# Patient Record
Sex: FEMALE | Race: WHITE | Employment: OTHER | URBAN - METROPOLITAN AREA
[De-identification: names, ages, dates, MRNs, and addresses within clinical notes are randomized per-mention and may not be internally consistent; named-entity substitution may affect disease eponyms.]

---

## 2019-08-28 ENCOUNTER — HOSPITAL ENCOUNTER (OUTPATIENT)
Age: 49
Setting detail: SPECIMEN
Discharge: HOME | End: 2019-08-28
Payer: MEDICAID

## 2019-08-28 LAB
ABSOLUTE EOS #: 0.23 K/UL (ref 0–0.44)
ABSOLUTE IMMATURE GRANULOCYTE: <0.03 K/UL (ref 0–0.3)
ABSOLUTE LYMPH #: 3.92 K/UL (ref 1.1–3.7)
ABSOLUTE MONO #: 0.68 K/UL (ref 0.1–1.2)
ALBUMIN SERPL-MCNC: 4 G/DL (ref 3.5–5.2)
ALBUMIN/GLOBULIN RATIO: 1.1 (ref 1–2.5)
ALP BLD-CCNC: 192 U/L (ref 35–104)
ALT SERPL-CCNC: 199 U/L (ref 5–33)
ANION GAP SERPL CALCULATED.3IONS-SCNC: 13 MMOL/L (ref 9–17)
AST SERPL-CCNC: 250 U/L
BASOPHILS # BLD: 1 % (ref 0–2)
BASOPHILS ABSOLUTE: 0.05 K/UL (ref 0–0.2)
BILIRUB SERPL-MCNC: 0.52 MG/DL (ref 0.3–1.2)
BUN BLDV-MCNC: 18 MG/DL (ref 6–20)
BUN/CREAT BLD: ABNORMAL (ref 9–20)
CALCIUM SERPL-MCNC: 9.3 MG/DL (ref 8.6–10.4)
CHLORIDE BLD-SCNC: 98 MMOL/L (ref 98–107)
CHOLESTEROL/HDL RATIO: 2.7
CHOLESTEROL: 171 MG/DL
CO2: 29 MMOL/L (ref 20–31)
CREAT SERPL-MCNC: 0.89 MG/DL (ref 0.5–0.9)
DIFFERENTIAL TYPE: ABNORMAL
EOSINOPHILS RELATIVE PERCENT: 2 % (ref 1–4)
GFR AFRICAN AMERICAN: >60 ML/MIN
GFR NON-AFRICAN AMERICAN: >60 ML/MIN
GFR SERPL CREATININE-BSD FRML MDRD: ABNORMAL ML/MIN/{1.73_M2}
GFR SERPL CREATININE-BSD FRML MDRD: ABNORMAL ML/MIN/{1.73_M2}
GLUCOSE BLD-MCNC: 125 MG/DL (ref 70–99)
HBV SURFACE AB TITR SER: <3.5 MIU/ML
HCT VFR BLD CALC: 45.2 % (ref 36.3–47.1)
HDLC SERPL-MCNC: 63 MG/DL
HEMOGLOBIN: 14.7 G/DL (ref 11.9–15.1)
HEPATITIS B CORE IGM ANTIBODY: NONREACTIVE
HEPATITIS B SURFACE ANTIGEN: NONREACTIVE
HEPATITIS C ANTIBODY: REACTIVE
HIV AG/AB: NONREACTIVE
IMMATURE GRANULOCYTES: 0 %
LDL CHOLESTEROL: 76 MG/DL (ref 0–130)
LYMPHOCYTES # BLD: 40 % (ref 24–43)
MCH RBC QN AUTO: 29.6 PG (ref 25.2–33.5)
MCHC RBC AUTO-ENTMCNC: 32.5 G/DL (ref 28.4–34.8)
MCV RBC AUTO: 90.9 FL (ref 82.6–102.9)
MONOCYTES # BLD: 7 % (ref 3–12)
NRBC AUTOMATED: 0 PER 100 WBC
PDW BLD-RTO: 13.1 % (ref 11.8–14.4)
PLATELET # BLD: 325 K/UL (ref 138–453)
PLATELET ESTIMATE: ABNORMAL
PMV BLD AUTO: 10.6 FL (ref 8.1–13.5)
POTASSIUM SERPL-SCNC: 3.9 MMOL/L (ref 3.7–5.3)
RBC # BLD: 4.97 M/UL (ref 3.95–5.11)
RBC # BLD: ABNORMAL 10*6/UL
SEG NEUTROPHILS: 50 % (ref 36–65)
SEGMENTED NEUTROPHILS ABSOLUTE COUNT: 4.92 K/UL (ref 1.5–8.1)
SODIUM BLD-SCNC: 140 MMOL/L (ref 135–144)
TOTAL PROTEIN: 7.6 G/DL (ref 6.4–8.3)
TRIGL SERPL-MCNC: 161 MG/DL
TSH SERPL DL<=0.05 MIU/L-ACNC: 5.11 MIU/L (ref 0.3–5)
VLDLC SERPL CALC-MCNC: ABNORMAL MG/DL (ref 1–30)
WBC # BLD: 9.8 K/UL (ref 3.5–11.3)
WBC # BLD: ABNORMAL 10*3/UL

## 2019-08-29 LAB — THYROXINE, FREE: 1.52 NG/DL (ref 0.93–1.7)

## 2019-08-30 LAB
DIRECT EXAM: ABNORMAL
DIRECT EXAM: ABNORMAL
Lab: ABNORMAL
SPECIMEN DESCRIPTION: ABNORMAL

## 2019-08-31 LAB
HBV QNT LOG, IU/ML: NOT DETECTED LOG IU/ML
HBV QNT, IU/ML: NOT DETECTED IU/ML
INTERPRETATION: NOT DETECTED

## 2019-09-01 LAB
ALANINE AMINOTRANSFERASE, FIBROMETER: 205 U/L (ref 5–40)
ALPHA-2-MACROGLOBULIN, FIBROMETER: 266 MG/DL (ref 131–293)
ASPARTATE AMINOTRANSFERASE, FIBROMETER: 264 U/L (ref 9–40)
CIRRHOMETER PATIENT SCORE: 0
EER FIBROMETER REPORT: ABNORMAL
FIBROMETER INTERPRETATION: ABNORMAL
FIBROMETER PATIENT SCORE: 0.86
FIBROMETER PLATELET COUNT: 325
FIBROMETER PROTHROMBIN INDEX: 117 % (ref 90–120)
FIBROSIS METAVIR CLASSIFICATION: ABNORMAL
GAMMA GLUTAMYL TRANSFERASE, FIBROMETER: 437 U/L (ref 7–33)
INFLAMETER METAVIR CLASSIFICATION: ABNORMAL
INFLAMETER PATIENT SCORE: 0.58
UREA NITROGEN, FIBROMETER: 19 MG/DL (ref 7–20)

## 2019-09-03 LAB
HCV QUANTITATIVE: NORMAL
HEPATITIS C GENOTYPE: NORMAL

## 2019-10-16 LAB
CHOLESTEROL, TOTAL: NORMAL MG/DL
CHOLESTEROL/HDL RATIO: NORMAL
HDLC SERPL-MCNC: NORMAL MG/DL (ref 35–70)
LDL CHOLESTEROL CALCULATED: NORMAL MG/DL (ref 0–160)
TRIGL SERPL-MCNC: NORMAL MG/DL
VLDLC SERPL CALC-MCNC: NORMAL MG/DL

## 2019-12-12 ENCOUNTER — HOSPITAL ENCOUNTER (INPATIENT)
Age: 49
LOS: 2 days | Discharge: HOME | DRG: 190 | End: 2019-12-14
Attending: FAMILY MEDICINE | Admitting: FAMILY MEDICINE
Payer: MEDICAID

## 2019-12-12 DIAGNOSIS — S82.892S CLOSED FRACTURE OF LEFT ANKLE, SEQUELA: ICD-10-CM

## 2019-12-12 DIAGNOSIS — I20.0 UNSTABLE ANGINA (HCC): Primary | ICD-10-CM

## 2019-12-12 LAB — APTT: 30.9 SECONDS (ref 22–38)

## 2019-12-12 PROCEDURE — 2709999900 HC NON-CHARGEABLE SUPPLY

## 2019-12-12 PROCEDURE — 85730 THROMBOPLASTIN TIME PARTIAL: CPT

## 2019-12-12 PROCEDURE — 36415 COLL VENOUS BLD VENIPUNCTURE: CPT

## 2019-12-12 PROCEDURE — 99223 1ST HOSP IP/OBS HIGH 75: CPT | Performed by: PHYSICIAN ASSISTANT

## 2019-12-12 PROCEDURE — 2140000000 HC CCU INTERMEDIATE R&B

## 2019-12-12 RX ORDER — LANSOPRAZOLE 15 MG/1
15 CAPSULE, DELAYED RELEASE ORAL DAILY PRN
COMMUNITY
Start: 2018-12-27

## 2019-12-12 RX ORDER — ONDANSETRON 4 MG/1
4 TABLET, ORALLY DISINTEGRATING ORAL EVERY 6 HOURS PRN
COMMUNITY
Start: 2019-08-07

## 2019-12-12 RX ORDER — ACETAMINOPHEN 325 MG/1
650 TABLET ORAL EVERY 4 HOURS PRN
COMMUNITY
Start: 2018-12-26

## 2019-12-12 RX ORDER — MIRTAZAPINE 15 MG/1
1 TABLET, FILM COATED ORAL NIGHTLY
Refills: 1 | COMMUNITY
Start: 2019-11-08

## 2019-12-12 RX ORDER — IBUPROFEN 800 MG/1
800 TABLET ORAL EVERY 8 HOURS PRN
Status: ON HOLD | COMMUNITY
Start: 2019-03-10 | End: 2019-12-14 | Stop reason: HOSPADM

## 2019-12-12 RX ORDER — DIVALPROEX SODIUM 125 MG/1
1 TABLET, DELAYED RELEASE ORAL 2 TIMES DAILY
Refills: 0 | COMMUNITY
Start: 2019-11-08

## 2019-12-12 RX ORDER — DULAGLUTIDE 0.75 MG/.5ML
1 INJECTION, SOLUTION SUBCUTANEOUS
Refills: 3 | COMMUNITY
Start: 2019-11-08

## 2019-12-12 RX ORDER — GABAPENTIN 300 MG/1
300 CAPSULE ORAL 3 TIMES DAILY
COMMUNITY
Start: 2019-01-10

## 2019-12-12 RX ORDER — LOSARTAN POTASSIUM 25 MG/1
25 TABLET ORAL
Status: ON HOLD | COMMUNITY
Start: 2018-12-27 | End: 2020-07-30 | Stop reason: HOSPADM

## 2019-12-12 RX ORDER — TORSEMIDE 20 MG/1
1 TABLET ORAL DAILY
Refills: 1 | Status: ON HOLD | COMMUNITY
Start: 2019-11-08 | End: 2020-07-30 | Stop reason: HOSPADM

## 2019-12-12 RX ORDER — MECLIZINE HCL 12.5 MG/1
12.5 TABLET ORAL
COMMUNITY
Start: 2019-06-18

## 2019-12-12 RX ORDER — SODIUM CHLORIDE 0.9 % (FLUSH) 0.9 %
10 SYRINGE (ML) INJECTION PRN
Status: DISCONTINUED | OUTPATIENT
Start: 2019-12-12 | End: 2019-12-14 | Stop reason: HOSPADM

## 2019-12-12 RX ORDER — ASPIRIN 81 MG/1
81 TABLET, CHEWABLE ORAL DAILY
Status: DISCONTINUED | OUTPATIENT
Start: 2019-12-13 | End: 2019-12-13

## 2019-12-12 RX ORDER — HEPARIN SODIUM 10000 [USP'U]/100ML
8.1 INJECTION, SOLUTION INTRAVENOUS CONTINUOUS
Status: DISCONTINUED | OUTPATIENT
Start: 2019-12-12 | End: 2019-12-14

## 2019-12-12 RX ORDER — HEPARIN SODIUM 1000 [USP'U]/ML
4000 INJECTION, SOLUTION INTRAVENOUS; SUBCUTANEOUS PRN
Status: DISCONTINUED | OUTPATIENT
Start: 2019-12-12 | End: 2019-12-14

## 2019-12-12 RX ORDER — OLANZAPINE 5 MG/1
1 TABLET ORAL NIGHTLY
Refills: 0 | COMMUNITY
Start: 2019-11-08

## 2019-12-12 RX ORDER — ACETAMINOPHEN 325 MG/1
650 TABLET ORAL EVERY 4 HOURS PRN
Status: DISCONTINUED | OUTPATIENT
Start: 2019-12-12 | End: 2019-12-14 | Stop reason: HOSPADM

## 2019-12-12 RX ORDER — HEPARIN SODIUM 1000 [USP'U]/ML
2000 INJECTION, SOLUTION INTRAVENOUS; SUBCUTANEOUS PRN
Status: DISCONTINUED | OUTPATIENT
Start: 2019-12-12 | End: 2019-12-14 | Stop reason: HOSPADM

## 2019-12-12 RX ORDER — ATOMOXETINE 25 MG/1
25 CAPSULE ORAL DAILY
COMMUNITY
Start: 2019-01-10

## 2019-12-12 RX ORDER — CITALOPRAM 40 MG/1
40 TABLET ORAL NIGHTLY
Status: ON HOLD | COMMUNITY
Start: 2019-01-10 | End: 2020-07-27

## 2019-12-12 RX ORDER — MINOCYCLINE HYDROCHLORIDE 100 MG/1
1 CAPSULE ORAL 2 TIMES DAILY
Refills: 2 | COMMUNITY
Start: 2019-11-08

## 2019-12-12 RX ORDER — SODIUM CHLORIDE 0.9 % (FLUSH) 0.9 %
10 SYRINGE (ML) INJECTION EVERY 12 HOURS SCHEDULED
Status: DISCONTINUED | OUTPATIENT
Start: 2019-12-12 | End: 2019-12-14 | Stop reason: HOSPADM

## 2019-12-12 RX ORDER — VENLAFAXINE HYDROCHLORIDE 75 MG/1
1 CAPSULE, EXTENDED RELEASE ORAL DAILY
Refills: 0 | COMMUNITY
Start: 2019-11-08

## 2019-12-12 RX ORDER — ASPIRIN 81 MG/1
81 TABLET, CHEWABLE ORAL DAILY
COMMUNITY
Start: 2018-12-27

## 2019-12-12 RX ORDER — ONDANSETRON 2 MG/ML
4 INJECTION INTRAMUSCULAR; INTRAVENOUS EVERY 6 HOURS PRN
Status: DISCONTINUED | OUTPATIENT
Start: 2019-12-12 | End: 2019-12-14 | Stop reason: HOSPADM

## 2019-12-12 RX ORDER — MELATONIN 3 MG
10 TABLET ORAL NIGHTLY
Status: ON HOLD | COMMUNITY
End: 2020-07-27

## 2019-12-12 RX ORDER — ATORVASTATIN CALCIUM 20 MG/1
1 TABLET, FILM COATED ORAL NIGHTLY
COMMUNITY
Start: 2018-12-27

## 2019-12-12 RX ORDER — LEVOTHYROXINE SODIUM 0.05 MG/1
50 TABLET ORAL
COMMUNITY
Start: 2019-08-29

## 2019-12-12 RX ORDER — HEPARIN SODIUM 1000 [USP'U]/ML
4000 INJECTION, SOLUTION INTRAVENOUS; SUBCUTANEOUS ONCE
Status: DISCONTINUED | OUTPATIENT
Start: 2019-12-13 | End: 2019-12-14

## 2019-12-12 ASSESSMENT — PAIN SCALES - GENERAL: PAINLEVEL_OUTOF10: 6

## 2019-12-12 ASSESSMENT — PAIN DESCRIPTION - PAIN TYPE: TYPE: ACUTE PAIN

## 2019-12-13 ENCOUNTER — APPOINTMENT (OUTPATIENT)
Dept: CARDIAC CATH/INVASIVE PROCEDURES | Age: 49
DRG: 190 | End: 2019-12-13
Attending: FAMILY MEDICINE
Payer: MEDICAID

## 2019-12-13 ENCOUNTER — APPOINTMENT (OUTPATIENT)
Dept: INTERVENTIONAL RADIOLOGY/VASCULAR | Age: 49
DRG: 190 | End: 2019-12-13
Attending: FAMILY MEDICINE
Payer: MEDICAID

## 2019-12-13 LAB
ALBUMIN SERPL-MCNC: 3.2 G/DL (ref 3.5–5.1)
ALP BLD-CCNC: 167 U/L (ref 38–126)
ALT SERPL-CCNC: 81 U/L (ref 11–66)
ANION GAP SERPL CALCULATED.3IONS-SCNC: 12 MEQ/L (ref 8–16)
APTT: 31.2 SECONDS (ref 22–38)
APTT: > 200 SECONDS (ref 22–38)
AST SERPL-CCNC: 125 U/L (ref 5–40)
BILIRUB SERPL-MCNC: 0.4 MG/DL (ref 0.3–1.2)
BUN BLDV-MCNC: 10 MG/DL (ref 7–22)
CALCIUM SERPL-MCNC: 8.5 MG/DL (ref 8.5–10.5)
CHLORIDE BLD-SCNC: 96 MEQ/L (ref 98–111)
CO2: 24 MEQ/L (ref 23–33)
CREAT SERPL-MCNC: 0.7 MG/DL (ref 0.4–1.2)
EKG ATRIAL RATE: 63 BPM
EKG ATRIAL RATE: 68 BPM
EKG P AXIS: -3 DEGREES
EKG P AXIS: 70 DEGREES
EKG P-R INTERVAL: 148 MS
EKG P-R INTERVAL: 150 MS
EKG Q-T INTERVAL: 434 MS
EKG Q-T INTERVAL: 442 MS
EKG QRS DURATION: 112 MS
EKG QRS DURATION: 116 MS
EKG QTC CALCULATION (BAZETT): 452 MS
EKG QTC CALCULATION (BAZETT): 461 MS
EKG R AXIS: -60 DEGREES
EKG R AXIS: -66 DEGREES
EKG T AXIS: 19 DEGREES
EKG T AXIS: 56 DEGREES
EKG VENTRICULAR RATE: 63 BPM
EKG VENTRICULAR RATE: 68 BPM
ERYTHROCYTE [DISTWIDTH] IN BLOOD BY AUTOMATED COUNT: 13.2 % (ref 11.5–14.5)
ERYTHROCYTE [DISTWIDTH] IN BLOOD BY AUTOMATED COUNT: 13.2 % (ref 11.5–14.5)
ERYTHROCYTE [DISTWIDTH] IN BLOOD BY AUTOMATED COUNT: 40.9 FL (ref 35–45)
ERYTHROCYTE [DISTWIDTH] IN BLOOD BY AUTOMATED COUNT: 42.5 FL (ref 35–45)
GFR SERPL CREATININE-BSD FRML MDRD: 89 ML/MIN/1.73M2
GLUCOSE BLD-MCNC: 177 MG/DL (ref 70–108)
GLUCOSE BLD-MCNC: 229 MG/DL (ref 70–108)
GLUCOSE BLD-MCNC: 286 MG/DL (ref 70–108)
GLUCOSE BLD-MCNC: 296 MG/DL (ref 70–108)
GLUCOSE BLD-MCNC: 339 MG/DL (ref 70–108)
GLUCOSE BLD-MCNC: 342 MG/DL (ref 70–108)
HCT VFR BLD CALC: 39.9 % (ref 37–47)
HCT VFR BLD CALC: 40.3 % (ref 37–47)
HEMOGLOBIN: 13.7 GM/DL (ref 12–16)
HEMOGLOBIN: 13.8 GM/DL (ref 12–16)
LV EF: 55 %
LVEF MODALITY: NORMAL
MCH RBC QN AUTO: 29.8 PG (ref 26–33)
MCH RBC QN AUTO: 30.2 PG (ref 26–33)
MCHC RBC AUTO-ENTMCNC: 34.2 GM/DL (ref 32.2–35.5)
MCHC RBC AUTO-ENTMCNC: 34.3 GM/DL (ref 32.2–35.5)
MCV RBC AUTO: 86.9 FL (ref 81–99)
MCV RBC AUTO: 88.2 FL (ref 81–99)
PLATELET # BLD: 241 THOU/MM3 (ref 130–400)
PLATELET # BLD: 260 THOU/MM3 (ref 130–400)
PMV BLD AUTO: 9.6 FL (ref 9.4–12.4)
PMV BLD AUTO: 9.8 FL (ref 9.4–12.4)
POTASSIUM SERPL-SCNC: 3.7 MEQ/L (ref 3.5–5.2)
PRO-BNP: 382.4 PG/ML (ref 0–450)
RBC # BLD: 4.57 MILL/MM3 (ref 4.2–5.4)
RBC # BLD: 4.59 MILL/MM3 (ref 4.2–5.4)
SODIUM BLD-SCNC: 132 MEQ/L (ref 135–145)
TOTAL PROTEIN: 6.3 G/DL (ref 6.1–8)
TROPONIN T: 0.02 NG/ML
TROPONIN T: 0.03 NG/ML
WBC # BLD: 8.3 THOU/MM3 (ref 4.8–10.8)
WBC # BLD: 8.7 THOU/MM3 (ref 4.8–10.8)

## 2019-12-13 PROCEDURE — C1769 GUIDE WIRE: HCPCS

## 2019-12-13 PROCEDURE — 94760 N-INVAS EAR/PLS OXIMETRY 1: CPT

## 2019-12-13 PROCEDURE — 2709999900 HC NON-CHARGEABLE SUPPLY

## 2019-12-13 PROCEDURE — C1894 INTRO/SHEATH, NON-LASER: HCPCS

## 2019-12-13 PROCEDURE — 93459 L HRT ART/GRFT ANGIO: CPT | Performed by: INTERNAL MEDICINE

## 2019-12-13 PROCEDURE — 93970 EXTREMITY STUDY: CPT

## 2019-12-13 PROCEDURE — 36415 COLL VENOUS BLD VENIPUNCTURE: CPT

## 2019-12-13 PROCEDURE — B2121ZZ FLUOROSCOPY OF SINGLE CORONARY ARTERY BYPASS GRAFT USING LOW OSMOLAR CONTRAST: ICD-10-PCS | Performed by: INTERNAL MEDICINE

## 2019-12-13 PROCEDURE — 2580000003 HC RX 258: Performed by: INTERNAL MEDICINE

## 2019-12-13 PROCEDURE — 99233 SBSQ HOSP IP/OBS HIGH 50: CPT | Performed by: INTERNAL MEDICINE

## 2019-12-13 PROCEDURE — 93458 L HRT ARTERY/VENTRICLE ANGIO: CPT | Performed by: INTERNAL MEDICINE

## 2019-12-13 PROCEDURE — 6370000000 HC RX 637 (ALT 250 FOR IP): Performed by: NUCLEAR MEDICINE

## 2019-12-13 PROCEDURE — B2151ZZ FLUOROSCOPY OF LEFT HEART USING LOW OSMOLAR CONTRAST: ICD-10-PCS | Performed by: INTERNAL MEDICINE

## 2019-12-13 PROCEDURE — 2500000003 HC RX 250 WO HCPCS: Performed by: INTERNAL MEDICINE

## 2019-12-13 PROCEDURE — 2500000003 HC RX 250 WO HCPCS

## 2019-12-13 PROCEDURE — 99254 IP/OBS CNSLTJ NEW/EST MOD 60: CPT | Performed by: NUCLEAR MEDICINE

## 2019-12-13 PROCEDURE — 93010 ELECTROCARDIOGRAM REPORT: CPT | Performed by: NUCLEAR MEDICINE

## 2019-12-13 PROCEDURE — 6370000000 HC RX 637 (ALT 250 FOR IP): Performed by: INTERNAL MEDICINE

## 2019-12-13 PROCEDURE — 83880 ASSAY OF NATRIURETIC PEPTIDE: CPT

## 2019-12-13 PROCEDURE — 82948 REAGENT STRIP/BLOOD GLUCOSE: CPT

## 2019-12-13 PROCEDURE — 4A023N7 MEASUREMENT OF CARDIAC SAMPLING AND PRESSURE, LEFT HEART, PERCUTANEOUS APPROACH: ICD-10-PCS | Performed by: INTERNAL MEDICINE

## 2019-12-13 PROCEDURE — 85027 COMPLETE CBC AUTOMATED: CPT

## 2019-12-13 PROCEDURE — B2111ZZ FLUOROSCOPY OF MULTIPLE CORONARY ARTERIES USING LOW OSMOLAR CONTRAST: ICD-10-PCS | Performed by: INTERNAL MEDICINE

## 2019-12-13 PROCEDURE — 84484 ASSAY OF TROPONIN QUANT: CPT

## 2019-12-13 PROCEDURE — 93005 ELECTROCARDIOGRAM TRACING: CPT | Performed by: PHYSICIAN ASSISTANT

## 2019-12-13 PROCEDURE — 6370000000 HC RX 637 (ALT 250 FOR IP): Performed by: PHYSICIAN ASSISTANT

## 2019-12-13 PROCEDURE — 2140000000 HC CCU INTERMEDIATE R&B

## 2019-12-13 PROCEDURE — 6360000002 HC RX W HCPCS

## 2019-12-13 PROCEDURE — 80053 COMPREHEN METABOLIC PANEL: CPT

## 2019-12-13 PROCEDURE — 85730 THROMBOPLASTIN TIME PARTIAL: CPT

## 2019-12-13 PROCEDURE — 6360000002 HC RX W HCPCS: Performed by: PHYSICIAN ASSISTANT

## 2019-12-13 PROCEDURE — 93005 ELECTROCARDIOGRAM TRACING: CPT | Performed by: INTERNAL MEDICINE

## 2019-12-13 PROCEDURE — 93306 TTE W/DOPPLER COMPLETE: CPT

## 2019-12-13 PROCEDURE — 6360000004 HC RX CONTRAST MEDICATION: Performed by: INTERNAL MEDICINE

## 2019-12-13 RX ORDER — MIRTAZAPINE 15 MG/1
15 TABLET, FILM COATED ORAL NIGHTLY
Status: DISCONTINUED | OUTPATIENT
Start: 2019-12-13 | End: 2019-12-14 | Stop reason: HOSPADM

## 2019-12-13 RX ORDER — ONDANSETRON 2 MG/ML
4 INJECTION INTRAMUSCULAR; INTRAVENOUS EVERY 6 HOURS PRN
Status: DISCONTINUED | OUTPATIENT
Start: 2019-12-13 | End: 2019-12-13

## 2019-12-13 RX ORDER — TORSEMIDE 20 MG/1
20 TABLET ORAL DAILY
Status: DISCONTINUED | OUTPATIENT
Start: 2019-12-13 | End: 2019-12-14 | Stop reason: HOSPADM

## 2019-12-13 RX ORDER — TRAMADOL HYDROCHLORIDE 50 MG/1
50 TABLET ORAL EVERY 4 HOURS
Status: DISCONTINUED | OUTPATIENT
Start: 2019-12-13 | End: 2019-12-13

## 2019-12-13 RX ORDER — TRAMADOL HYDROCHLORIDE 50 MG/1
50 TABLET ORAL EVERY 4 HOURS PRN
Status: DISCONTINUED | OUTPATIENT
Start: 2019-12-13 | End: 2019-12-14 | Stop reason: HOSPADM

## 2019-12-13 RX ORDER — PANTOPRAZOLE SODIUM 40 MG/1
40 TABLET, DELAYED RELEASE ORAL
Status: DISCONTINUED | OUTPATIENT
Start: 2019-12-13 | End: 2019-12-14 | Stop reason: HOSPADM

## 2019-12-13 RX ORDER — ATORVASTATIN CALCIUM 20 MG/1
20 TABLET, FILM COATED ORAL NIGHTLY
Status: DISCONTINUED | OUTPATIENT
Start: 2019-12-13 | End: 2019-12-14 | Stop reason: HOSPADM

## 2019-12-13 RX ORDER — SODIUM CHLORIDE 0.9 % (FLUSH) 0.9 %
10 SYRINGE (ML) INJECTION EVERY 12 HOURS SCHEDULED
Status: DISCONTINUED | OUTPATIENT
Start: 2019-12-13 | End: 2019-12-13 | Stop reason: SDUPTHER

## 2019-12-13 RX ORDER — ASPIRIN 81 MG/1
81 TABLET, CHEWABLE ORAL DAILY
Status: DISCONTINUED | OUTPATIENT
Start: 2019-12-13 | End: 2019-12-14 | Stop reason: HOSPADM

## 2019-12-13 RX ORDER — NITROGLYCERIN 20 MG/100ML
5 INJECTION INTRAVENOUS CONTINUOUS
Status: DISCONTINUED | OUTPATIENT
Start: 2019-12-13 | End: 2019-12-14

## 2019-12-13 RX ORDER — MORPHINE SULFATE 2 MG/ML
2 INJECTION, SOLUTION INTRAMUSCULAR; INTRAVENOUS
Status: ACTIVE | OUTPATIENT
Start: 2019-12-13 | End: 2019-12-13

## 2019-12-13 RX ORDER — OLANZAPINE 5 MG/1
5 TABLET ORAL NIGHTLY
Status: DISCONTINUED | OUTPATIENT
Start: 2019-12-13 | End: 2019-12-14 | Stop reason: HOSPADM

## 2019-12-13 RX ORDER — LANOLIN ALCOHOL/MO/W.PET/CERES
6 CREAM (GRAM) TOPICAL NIGHTLY
Status: DISCONTINUED | OUTPATIENT
Start: 2019-12-13 | End: 2019-12-14 | Stop reason: HOSPADM

## 2019-12-13 RX ORDER — VENLAFAXINE HYDROCHLORIDE 75 MG/1
75 CAPSULE, EXTENDED RELEASE ORAL DAILY
Status: DISCONTINUED | OUTPATIENT
Start: 2019-12-13 | End: 2019-12-14 | Stop reason: HOSPADM

## 2019-12-13 RX ORDER — LEVOTHYROXINE SODIUM 0.05 MG/1
50 TABLET ORAL
Status: DISCONTINUED | OUTPATIENT
Start: 2019-12-13 | End: 2019-12-14 | Stop reason: HOSPADM

## 2019-12-13 RX ORDER — DIVALPROEX SODIUM 125 MG/1
125 CAPSULE, COATED PELLETS ORAL 2 TIMES DAILY WITH MEALS
Status: DISCONTINUED | OUTPATIENT
Start: 2019-12-13 | End: 2019-12-14 | Stop reason: HOSPADM

## 2019-12-13 RX ORDER — CITALOPRAM 40 MG/1
40 TABLET ORAL NIGHTLY
Status: DISCONTINUED | OUTPATIENT
Start: 2019-12-13 | End: 2019-12-13

## 2019-12-13 RX ORDER — NITROGLYCERIN 20 MG/100ML
5 INJECTION INTRAVENOUS CONTINUOUS
Status: DISCONTINUED | OUTPATIENT
Start: 2019-12-13 | End: 2019-12-13 | Stop reason: SDUPTHER

## 2019-12-13 RX ORDER — ATROPINE SULFATE 0.4 MG/ML
0.5 AMPUL (ML) INJECTION
Status: ACTIVE | OUTPATIENT
Start: 2019-12-13 | End: 2019-12-13

## 2019-12-13 RX ORDER — SODIUM CHLORIDE 9 MG/ML
75 INJECTION, SOLUTION INTRAVENOUS CONTINUOUS
Status: DISCONTINUED | OUTPATIENT
Start: 2019-12-13 | End: 2019-12-14

## 2019-12-13 RX ORDER — ACETAMINOPHEN 325 MG/1
650 TABLET ORAL EVERY 4 HOURS PRN
Status: DISCONTINUED | OUTPATIENT
Start: 2019-12-13 | End: 2019-12-13

## 2019-12-13 RX ORDER — INSULIN GLARGINE 100 [IU]/ML
20 INJECTION, SOLUTION SUBCUTANEOUS 2 TIMES DAILY
Status: DISCONTINUED | OUTPATIENT
Start: 2019-12-14 | End: 2019-12-14 | Stop reason: HOSPADM

## 2019-12-13 RX ORDER — GABAPENTIN 300 MG/1
300 CAPSULE ORAL 3 TIMES DAILY
Status: DISCONTINUED | OUTPATIENT
Start: 2019-12-13 | End: 2019-12-14 | Stop reason: HOSPADM

## 2019-12-13 RX ORDER — LOSARTAN POTASSIUM 25 MG/1
25 TABLET ORAL
Status: DISCONTINUED | OUTPATIENT
Start: 2019-12-13 | End: 2019-12-14 | Stop reason: HOSPADM

## 2019-12-13 RX ORDER — SODIUM CHLORIDE 0.9 % (FLUSH) 0.9 %
10 SYRINGE (ML) INJECTION PRN
Status: DISCONTINUED | OUTPATIENT
Start: 2019-12-13 | End: 2019-12-13

## 2019-12-13 RX ADMIN — METOPROLOL TARTRATE 25 MG: 25 TABLET ORAL at 01:12

## 2019-12-13 RX ADMIN — VENLAFAXINE HYDROCHLORIDE 75 MG: 75 CAPSULE, EXTENDED RELEASE ORAL at 09:10

## 2019-12-13 RX ADMIN — METOPROLOL TARTRATE 25 MG: 25 TABLET ORAL at 08:17

## 2019-12-13 RX ADMIN — TRAMADOL HYDROCHLORIDE 50 MG: 50 TABLET, FILM COATED ORAL at 20:58

## 2019-12-13 RX ADMIN — Medication 6 MG: at 20:58

## 2019-12-13 RX ADMIN — INSULIN LISPRO 3 UNITS: 100 INJECTION, SOLUTION INTRAVENOUS; SUBCUTANEOUS at 20:58

## 2019-12-13 RX ADMIN — NITROGLYCERIN 5 MCG/MIN: 20 INJECTION INTRAVENOUS at 09:03

## 2019-12-13 RX ADMIN — DIVALPROEX SODIUM 125 MG: 125 CAPSULE ORAL at 18:07

## 2019-12-13 RX ADMIN — LOSARTAN POTASSIUM 25 MG: 25 TABLET, FILM COATED ORAL at 08:17

## 2019-12-13 RX ADMIN — PANTOPRAZOLE SODIUM 40 MG: 40 TABLET, DELAYED RELEASE ORAL at 08:17

## 2019-12-13 RX ADMIN — ACETAMINOPHEN 650 MG: 325 TABLET ORAL at 10:45

## 2019-12-13 RX ADMIN — Medication 6 MG: at 01:12

## 2019-12-13 RX ADMIN — HEPARIN SODIUM 16 UNITS/KG/HR: 10000 INJECTION, SOLUTION INTRAVENOUS at 01:13

## 2019-12-13 RX ADMIN — ATORVASTATIN CALCIUM 20 MG: 20 TABLET, FILM COATED ORAL at 20:58

## 2019-12-13 RX ADMIN — DIVALPROEX SODIUM 125 MG: 125 CAPSULE ORAL at 08:17

## 2019-12-13 RX ADMIN — GABAPENTIN 300 MG: 300 CAPSULE ORAL at 09:10

## 2019-12-13 RX ADMIN — ATORVASTATIN CALCIUM 20 MG: 20 TABLET, FILM COATED ORAL at 01:12

## 2019-12-13 RX ADMIN — IOPAMIDOL 80 ML: 755 INJECTION, SOLUTION INTRAVENOUS at 15:27

## 2019-12-13 RX ADMIN — METOPROLOL TARTRATE 25 MG: 25 TABLET ORAL at 20:58

## 2019-12-13 RX ADMIN — GABAPENTIN 300 MG: 300 CAPSULE ORAL at 16:49

## 2019-12-13 RX ADMIN — GABAPENTIN 300 MG: 300 CAPSULE ORAL at 20:58

## 2019-12-13 RX ADMIN — MIRTAZAPINE 15 MG: 15 TABLET, FILM COATED ORAL at 20:58

## 2019-12-13 RX ADMIN — SODIUM CHLORIDE 75 ML/HR: 9 INJECTION, SOLUTION INTRAVENOUS at 16:51

## 2019-12-13 RX ADMIN — TRAMADOL HYDROCHLORIDE 50 MG: 50 TABLET, FILM COATED ORAL at 12:33

## 2019-12-13 RX ADMIN — HEPARIN SODIUM 12 UNITS/KG/HR: 10000 INJECTION, SOLUTION INTRAVENOUS at 12:34

## 2019-12-13 RX ADMIN — ASPIRIN 81 MG 81 MG: 81 TABLET ORAL at 09:09

## 2019-12-13 RX ADMIN — ACETAMINOPHEN 650 MG: 325 TABLET ORAL at 20:05

## 2019-12-13 RX ADMIN — MIRTAZAPINE 15 MG: 15 TABLET, FILM COATED ORAL at 01:12

## 2019-12-13 RX ADMIN — OLANZAPINE 5 MG: 5 TABLET, FILM COATED ORAL at 20:58

## 2019-12-13 RX ADMIN — LEVOTHYROXINE SODIUM 50 MCG: 50 TABLET ORAL at 08:17

## 2019-12-13 RX ADMIN — TRAMADOL HYDROCHLORIDE 50 MG: 50 TABLET, FILM COATED ORAL at 16:49

## 2019-12-13 RX ADMIN — INSULIN LISPRO 4 UNITS: 100 INJECTION, SOLUTION INTRAVENOUS; SUBCUTANEOUS at 02:43

## 2019-12-13 RX ADMIN — HEPARIN SODIUM 4000 UNITS: 1000 INJECTION INTRAVENOUS; SUBCUTANEOUS at 01:13

## 2019-12-13 RX ADMIN — OLANZAPINE 5 MG: 5 TABLET, FILM COATED ORAL at 01:12

## 2019-12-13 RX ADMIN — INSULIN LISPRO 2 UNITS: 100 INJECTION, SOLUTION INTRAVENOUS; SUBCUTANEOUS at 18:07

## 2019-12-13 ASSESSMENT — ENCOUNTER SYMPTOMS
SHORTNESS OF BREATH: 0
ABDOMINAL PAIN: 0
EYE PAIN: 0
SINUS PAIN: 0
BACK PAIN: 0
EYE REDNESS: 0
WHEEZING: 0
ABDOMINAL DISTENTION: 0
COUGH: 0
SORE THROAT: 0

## 2019-12-13 ASSESSMENT — PAIN SCALES - GENERAL
PAINLEVEL_OUTOF10: 1
PAINLEVEL_OUTOF10: 6
PAINLEVEL_OUTOF10: 5
PAINLEVEL_OUTOF10: 5
PAINLEVEL_OUTOF10: 6

## 2019-12-13 ASSESSMENT — PAIN DESCRIPTION - FREQUENCY: FREQUENCY: CONTINUOUS

## 2019-12-13 ASSESSMENT — PAIN - FUNCTIONAL ASSESSMENT: PAIN_FUNCTIONAL_ASSESSMENT: ACTIVITIES ARE NOT PREVENTED

## 2019-12-13 ASSESSMENT — PAIN DESCRIPTION - LOCATION: LOCATION: CHEST

## 2019-12-13 ASSESSMENT — PAIN DESCRIPTION - PROGRESSION
CLINICAL_PROGRESSION: GRADUALLY IMPROVING

## 2019-12-13 ASSESSMENT — PAIN DESCRIPTION - ORIENTATION: ORIENTATION: MID

## 2019-12-13 ASSESSMENT — PAIN DESCRIPTION - ONSET: ONSET: ON-GOING

## 2019-12-13 ASSESSMENT — PAIN DESCRIPTION - PAIN TYPE: TYPE: ACUTE PAIN

## 2019-12-13 ASSESSMENT — PAIN DESCRIPTION - DESCRIPTORS: DESCRIPTORS: DISCOMFORT

## 2019-12-14 VITALS
BODY MASS INDEX: 44.2 KG/M2 | HEIGHT: 65 IN | OXYGEN SATURATION: 96 % | WEIGHT: 265.3 LBS | RESPIRATION RATE: 18 BRPM | SYSTOLIC BLOOD PRESSURE: 122 MMHG | TEMPERATURE: 97.6 F | HEART RATE: 62 BPM | DIASTOLIC BLOOD PRESSURE: 64 MMHG

## 2019-12-14 LAB
ANION GAP SERPL CALCULATED.3IONS-SCNC: 9 MEQ/L (ref 8–16)
BUN BLDV-MCNC: 14 MG/DL (ref 7–22)
CALCIUM SERPL-MCNC: 8.2 MG/DL (ref 8.5–10.5)
CHLORIDE BLD-SCNC: 102 MEQ/L (ref 98–111)
CO2: 23 MEQ/L (ref 23–33)
CREAT SERPL-MCNC: 0.7 MG/DL (ref 0.4–1.2)
ERYTHROCYTE [DISTWIDTH] IN BLOOD BY AUTOMATED COUNT: 13.5 % (ref 11.5–14.5)
ERYTHROCYTE [DISTWIDTH] IN BLOOD BY AUTOMATED COUNT: 44.9 FL (ref 35–45)
GFR SERPL CREATININE-BSD FRML MDRD: 89 ML/MIN/1.73M2
GLUCOSE BLD-MCNC: 293 MG/DL (ref 70–108)
GLUCOSE BLD-MCNC: 293 MG/DL (ref 70–108)
GLUCOSE BLD-MCNC: 379 MG/DL (ref 70–108)
HCT VFR BLD CALC: 38.7 % (ref 37–47)
HEMOGLOBIN: 12.8 GM/DL (ref 12–16)
MCH RBC QN AUTO: 30 PG (ref 26–33)
MCHC RBC AUTO-ENTMCNC: 33.1 GM/DL (ref 32.2–35.5)
MCV RBC AUTO: 90.8 FL (ref 81–99)
PLATELET # BLD: 246 THOU/MM3 (ref 130–400)
PMV BLD AUTO: 9.8 FL (ref 9.4–12.4)
POTASSIUM SERPL-SCNC: 3.9 MEQ/L (ref 3.5–5.2)
RBC # BLD: 4.26 MILL/MM3 (ref 4.2–5.4)
SODIUM BLD-SCNC: 134 MEQ/L (ref 135–145)
WBC # BLD: 7 THOU/MM3 (ref 4.8–10.8)

## 2019-12-14 PROCEDURE — 6370000000 HC RX 637 (ALT 250 FOR IP): Performed by: INTERNAL MEDICINE

## 2019-12-14 PROCEDURE — 80048 BASIC METABOLIC PNL TOTAL CA: CPT

## 2019-12-14 PROCEDURE — 6360000002 HC RX W HCPCS: Performed by: PHYSICIAN ASSISTANT

## 2019-12-14 PROCEDURE — 97530 THERAPEUTIC ACTIVITIES: CPT

## 2019-12-14 PROCEDURE — 97163 PT EVAL HIGH COMPLEX 45 MIN: CPT

## 2019-12-14 PROCEDURE — 2709999900 HC NON-CHARGEABLE SUPPLY

## 2019-12-14 PROCEDURE — G0008 ADMIN INFLUENZA VIRUS VAC: HCPCS | Performed by: PHYSICIAN ASSISTANT

## 2019-12-14 PROCEDURE — 82948 REAGENT STRIP/BLOOD GLUCOSE: CPT

## 2019-12-14 PROCEDURE — 2580000003 HC RX 258: Performed by: INTERNAL MEDICINE

## 2019-12-14 PROCEDURE — 6370000000 HC RX 637 (ALT 250 FOR IP): Performed by: NUCLEAR MEDICINE

## 2019-12-14 PROCEDURE — 6370000000 HC RX 637 (ALT 250 FOR IP): Performed by: PHYSICIAN ASSISTANT

## 2019-12-14 PROCEDURE — 99239 HOSP IP/OBS DSCHRG MGMT >30: CPT | Performed by: INTERNAL MEDICINE

## 2019-12-14 PROCEDURE — 2580000003 HC RX 258: Performed by: PHYSICIAN ASSISTANT

## 2019-12-14 PROCEDURE — 36415 COLL VENOUS BLD VENIPUNCTURE: CPT

## 2019-12-14 PROCEDURE — 90686 IIV4 VACC NO PRSV 0.5 ML IM: CPT | Performed by: PHYSICIAN ASSISTANT

## 2019-12-14 PROCEDURE — 97116 GAIT TRAINING THERAPY: CPT

## 2019-12-14 PROCEDURE — 85027 COMPLETE CBC AUTOMATED: CPT

## 2019-12-14 RX ORDER — TRAMADOL HYDROCHLORIDE 50 MG/1
50 TABLET ORAL EVERY 4 HOURS PRN
Qty: 30 TABLET | Refills: 0 | Status: SHIPPED | OUTPATIENT
Start: 2019-12-14 | End: 2019-12-19

## 2019-12-14 RX ADMIN — TRAMADOL HYDROCHLORIDE 50 MG: 50 TABLET, FILM COATED ORAL at 04:40

## 2019-12-14 RX ADMIN — Medication 10 ML: at 09:00

## 2019-12-14 RX ADMIN — METOPROLOL TARTRATE 25 MG: 25 TABLET ORAL at 09:00

## 2019-12-14 RX ADMIN — INSULIN LISPRO 6 UNITS: 100 INJECTION, SOLUTION INTRAVENOUS; SUBCUTANEOUS at 10:16

## 2019-12-14 RX ADMIN — VENLAFAXINE HYDROCHLORIDE 75 MG: 75 CAPSULE, EXTENDED RELEASE ORAL at 09:00

## 2019-12-14 RX ADMIN — INFLUENZA A VIRUS A/BRISBANE/02/2018 IVR-190 (H1N1) ANTIGEN (PROPIOLACTONE INACTIVATED), INFLUENZA A VIRUS A/KANSAS/14/2017 X-327 (H3N2) ANTIGEN (PROPIOLACTONE INACTIVATED), INFLUENZA B VIRUS B/MARYLAND/15/2016 ANTIGEN (PROPIOLACTONE INACTIVATED), INFLUENZA B VIRUS B/PHUKET/3073/2013 BVR-1B ANTIGEN (PROPIOLACTONE INACTIVATED) 0.5 ML: 15; 15; 15; 15 INJECTION, SUSPENSION INTRAMUSCULAR at 14:05

## 2019-12-14 RX ADMIN — DIVALPROEX SODIUM 125 MG: 125 CAPSULE ORAL at 10:13

## 2019-12-14 RX ADMIN — TRAMADOL HYDROCHLORIDE 50 MG: 50 TABLET, FILM COATED ORAL at 11:54

## 2019-12-14 RX ADMIN — SODIUM CHLORIDE 75 ML/HR: 9 INJECTION, SOLUTION INTRAVENOUS at 01:00

## 2019-12-14 RX ADMIN — TORSEMIDE 20 MG: 20 TABLET ORAL at 10:18

## 2019-12-14 RX ADMIN — ASPIRIN 81 MG 81 MG: 81 TABLET ORAL at 09:00

## 2019-12-14 RX ADMIN — PANTOPRAZOLE SODIUM 40 MG: 40 TABLET, DELAYED RELEASE ORAL at 06:40

## 2019-12-14 RX ADMIN — GABAPENTIN 300 MG: 300 CAPSULE ORAL at 14:05

## 2019-12-14 RX ADMIN — LOSARTAN POTASSIUM 25 MG: 25 TABLET, FILM COATED ORAL at 06:40

## 2019-12-14 RX ADMIN — GABAPENTIN 300 MG: 300 CAPSULE ORAL at 09:00

## 2019-12-14 RX ADMIN — INSULIN LISPRO 10 UNITS: 100 INJECTION, SOLUTION INTRAVENOUS; SUBCUTANEOUS at 11:38

## 2019-12-14 RX ADMIN — LEVOTHYROXINE SODIUM 50 MCG: 50 TABLET ORAL at 06:40

## 2019-12-14 RX ADMIN — INSULIN GLARGINE 20 UNITS: 100 INJECTION, SOLUTION SUBCUTANEOUS at 10:16

## 2019-12-14 ASSESSMENT — PAIN DESCRIPTION - PROGRESSION
CLINICAL_PROGRESSION: GRADUALLY IMPROVING
CLINICAL_PROGRESSION: NOT CHANGED

## 2019-12-14 ASSESSMENT — PAIN SCALES - GENERAL
PAINLEVEL_OUTOF10: 0
PAINLEVEL_OUTOF10: 4
PAINLEVEL_OUTOF10: 5
PAINLEVEL_OUTOF10: 6
PAINLEVEL_OUTOF10: 6

## 2019-12-14 ASSESSMENT — PAIN DESCRIPTION - PAIN TYPE
TYPE: ACUTE PAIN
TYPE: ACUTE PAIN

## 2019-12-14 ASSESSMENT — PAIN DESCRIPTION - LOCATION
LOCATION: ANKLE
LOCATION: ANKLE

## 2019-12-14 ASSESSMENT — PAIN DESCRIPTION - ONSET: ONSET: ON-GOING

## 2019-12-14 ASSESSMENT — PAIN - FUNCTIONAL ASSESSMENT: PAIN_FUNCTIONAL_ASSESSMENT: PREVENTS OR INTERFERES SOME ACTIVE ACTIVITIES AND ADLS

## 2019-12-14 ASSESSMENT — PAIN DESCRIPTION - DESCRIPTORS: DESCRIPTORS: ACHING

## 2019-12-14 ASSESSMENT — PAIN DESCRIPTION - ORIENTATION
ORIENTATION: LEFT
ORIENTATION: LEFT

## 2019-12-14 ASSESSMENT — PAIN DESCRIPTION - FREQUENCY: FREQUENCY: CONTINUOUS

## 2020-01-13 ENCOUNTER — HOSPITAL ENCOUNTER (OUTPATIENT)
Age: 50
Setting detail: SPECIMEN
Discharge: HOME | End: 2020-01-13
Payer: MEDICAID

## 2020-01-14 LAB
CULTURE: ABNORMAL
Lab: ABNORMAL
SPECIMEN DESCRIPTION: ABNORMAL

## 2020-01-15 ENCOUNTER — TELEPHONE (OUTPATIENT)
Dept: CARDIOLOGY CLINIC | Age: 50
End: 2020-01-15

## 2020-01-16 LAB
CHLAMYDIA BY THIN PREP: NEGATIVE
HPV SAMPLE: ABNORMAL
HPV, GENOTYPE 16: NOT DETECTED
HPV, GENOTYPE 18: NOT DETECTED
HPV, HIGH RISK OTHER: DETECTED
HPV, INTERPRETATION: ABNORMAL
N. GONORRHOEAE DNA, THIN PREP: NEGATIVE
SPECIMEN DESCRIPTION: ABNORMAL
SPECIMEN DESCRIPTION: NORMAL

## 2020-01-24 LAB — CYTOLOGY REPORT: NORMAL

## 2020-03-24 ENCOUNTER — HOSPITAL ENCOUNTER (EMERGENCY)
Age: 50
Discharge: HOME | End: 2020-03-24
Payer: MEDICAID

## 2020-03-24 ENCOUNTER — HOSPITAL ENCOUNTER (OUTPATIENT)
Age: 50
Setting detail: SPECIMEN
Discharge: HOME | End: 2020-03-24
Payer: MEDICAID

## 2020-03-24 VITALS
HEIGHT: 65 IN | SYSTOLIC BLOOD PRESSURE: 150 MMHG | WEIGHT: 244 LBS | OXYGEN SATURATION: 98 % | BODY MASS INDEX: 40.65 KG/M2 | TEMPERATURE: 97.9 F | RESPIRATION RATE: 18 BRPM | DIASTOLIC BLOOD PRESSURE: 87 MMHG | HEART RATE: 93 BPM

## 2020-03-24 LAB
ALBUMIN SERPL-MCNC: 3.6 G/DL (ref 3.5–5.1)
ALP BLD-CCNC: 302 U/L (ref 38–126)
ALT SERPL-CCNC: 105 U/L (ref 11–66)
AMPHETAMINE+METHAMPHETAMINE URINE SCREEN: NEGATIVE
ANION GAP SERPL CALCULATED.3IONS-SCNC: 16 MEQ/L (ref 8–16)
AST SERPL-CCNC: 104 U/L (ref 5–40)
BARBITURATE QUANTITATIVE URINE: NEGATIVE
BASOPHILS # BLD: 0.4 %
BASOPHILS ABSOLUTE: 0 THOU/MM3 (ref 0–0.1)
BENZODIAZEPINE QUANTITATIVE URINE: NEGATIVE
BETA-HYDROXYBUTYRATE: 1.6 MG/DL (ref 0.2–2.81)
BILIRUB SERPL-MCNC: 0.2 MG/DL (ref 0.3–1.2)
BILIRUBIN URINE: NEGATIVE
BLOOD, URINE: NEGATIVE
BUN BLDV-MCNC: 20 MG/DL (ref 7–22)
CALCIUM SERPL-MCNC: 9 MG/DL (ref 8.5–10.5)
CANNABINOID QUANTITATIVE URINE: NEGATIVE
CHARACTER, URINE: CLEAR
CHLAMYDIA TRACHOMATIS BY RT-PCR: NOT DETECTED
CHLORIDE BLD-SCNC: 88 MEQ/L (ref 98–111)
CO2: 20 MEQ/L (ref 23–33)
COCAINE METABOLITE QUANTITATIVE URINE: NEGATIVE
COLOR: YELLOW
CREAT SERPL-MCNC: 0.6 MG/DL (ref 0.4–1.2)
CT/NG SOURCE: NORMAL
DIRECT EXAM: ABNORMAL
EKG ATRIAL RATE: 85 BPM
EKG P AXIS: 64 DEGREES
EKG P-R INTERVAL: 150 MS
EKG Q-T INTERVAL: 396 MS
EKG QRS DURATION: 118 MS
EKG QTC CALCULATION (BAZETT): 471 MS
EKG R AXIS: -54 DEGREES
EKG T AXIS: 81 DEGREES
EKG VENTRICULAR RATE: 85 BPM
EOSINOPHIL # BLD: 1.5 %
EOSINOPHILS ABSOLUTE: 0.1 THOU/MM3 (ref 0–0.4)
ERYTHROCYTE [DISTWIDTH] IN BLOOD BY AUTOMATED COUNT: 12.2 % (ref 11.5–14.5)
ERYTHROCYTE [DISTWIDTH] IN BLOOD BY AUTOMATED COUNT: 38.4 FL (ref 35–45)
GFR SERPL CREATININE-BSD FRML MDRD: > 90 ML/MIN/1.73M2
GLUCOSE BLD-MCNC: 398 MG/DL (ref 70–108)
GLUCOSE BLD-MCNC: 474 MG/DL (ref 70–108)
GLUCOSE BLD-MCNC: 503 MG/DL (ref 70–108)
GLUCOSE BLD-MCNC: 711 MG/DL (ref 70–108)
GLUCOSE BLD-MCNC: > 600 MG/DL (ref 70–108)
GLUCOSE URINE: >= 1000 MG/DL
HCT VFR BLD CALC: 40.9 % (ref 37–47)
HEMOGLOBIN: 13.9 GM/DL (ref 12–16)
IMMATURE GRANS (ABS): 0.02 THOU/MM3 (ref 0–0.07)
IMMATURE GRANULOCYTES: 0.2 %
KETONES, URINE: NEGATIVE
KOH PREP: NORMAL
LEUKOCYTE ESTERASE, URINE: NEGATIVE
LYMPHOCYTES # BLD: 27.5 %
LYMPHOCYTES ABSOLUTE: 2.3 THOU/MM3 (ref 1–4.8)
Lab: ABNORMAL
MCH RBC QN AUTO: 29.6 PG (ref 26–33)
MCHC RBC AUTO-ENTMCNC: 34 GM/DL (ref 32.2–35.5)
MCV RBC AUTO: 87.2 FL (ref 81–99)
MONOCYTES # BLD: 6.6 %
MONOCYTES ABSOLUTE: 0.6 THOU/MM3 (ref 0.4–1.3)
NEISSERIA GONORRHOEAE BY RT-PCR: NOT DETECTED
NITRITE, URINE: NEGATIVE
NUCLEATED RED BLOOD CELLS: 0 /100 WBC
OPIATES, URINE: NEGATIVE
OSMOLALITY CALCULATION: 286.3 MOSMOL/KG (ref 275–300)
OXYCODONE: NEGATIVE
PH UA: 5 (ref 5–9)
PH VENOUS: 7.36 (ref 7.31–7.41)
PHENCYCLIDINE QUANTITATIVE URINE: NEGATIVE
PLATELET # BLD: 219 THOU/MM3 (ref 130–400)
PMV BLD AUTO: 10.6 FL (ref 9.4–12.4)
POTASSIUM SERPL-SCNC: 3.9 MEQ/L (ref 3.5–5.2)
PROTEIN UA: NEGATIVE
RBC # BLD: 4.69 MILL/MM3 (ref 4.2–5.4)
SEG NEUTROPHILS: 63.8 %
SEGMENTED NEUTROPHILS ABSOLUTE COUNT: 5.4 THOU/MM3 (ref 1.8–7.7)
SODIUM BLD-SCNC: 124 MEQ/L (ref 135–145)
SPECIFIC GRAVITY, URINE: 1.02 (ref 1–1.03)
SPECIMEN DESCRIPTION: ABNORMAL
TOTAL PROTEIN: 6.8 G/DL (ref 6.1–8)
TRICHOMONAS PREP: NORMAL
TROPONIN T: < 0.01 NG/ML
UROBILINOGEN, URINE: 0.2 EU/DL (ref 0–1)
WBC # BLD: 8.5 THOU/MM3 (ref 4.8–10.8)

## 2020-03-24 PROCEDURE — 96361 HYDRATE IV INFUSION ADD-ON: CPT

## 2020-03-24 PROCEDURE — 93005 ELECTROCARDIOGRAM TRACING: CPT | Performed by: EMERGENCY MEDICINE

## 2020-03-24 PROCEDURE — 85025 COMPLETE CBC W/AUTO DIFF WBC: CPT

## 2020-03-24 PROCEDURE — 80307 DRUG TEST PRSMV CHEM ANLYZR: CPT

## 2020-03-24 PROCEDURE — 99285 EMERGENCY DEPT VISIT HI MDM: CPT

## 2020-03-24 PROCEDURE — 82948 REAGENT STRIP/BLOOD GLUCOSE: CPT

## 2020-03-24 PROCEDURE — 94761 N-INVAS EAR/PLS OXIMETRY MLT: CPT

## 2020-03-24 PROCEDURE — 82010 KETONE BODYS QUAN: CPT

## 2020-03-24 PROCEDURE — 6370000000 HC RX 637 (ALT 250 FOR IP): Performed by: EMERGENCY MEDICINE

## 2020-03-24 PROCEDURE — 81003 URINALYSIS AUTO W/O SCOPE: CPT

## 2020-03-24 PROCEDURE — 87591 N.GONORRHOEAE DNA AMP PROB: CPT

## 2020-03-24 PROCEDURE — 87491 CHLMYD TRACH DNA AMP PROBE: CPT

## 2020-03-24 PROCEDURE — 84484 ASSAY OF TROPONIN QUANT: CPT

## 2020-03-24 PROCEDURE — 87205 SMEAR GRAM STAIN: CPT

## 2020-03-24 PROCEDURE — 87070 CULTURE OTHR SPECIMN AEROBIC: CPT

## 2020-03-24 PROCEDURE — 2580000003 HC RX 258: Performed by: EMERGENCY MEDICINE

## 2020-03-24 PROCEDURE — 82800 BLOOD PH: CPT

## 2020-03-24 PROCEDURE — 6360000002 HC RX W HCPCS: Performed by: EMERGENCY MEDICINE

## 2020-03-24 PROCEDURE — 96375 TX/PRO/DX INJ NEW DRUG ADDON: CPT

## 2020-03-24 PROCEDURE — 96374 THER/PROPH/DIAG INJ IV PUSH: CPT

## 2020-03-24 PROCEDURE — 36415 COLL VENOUS BLD VENIPUNCTURE: CPT

## 2020-03-24 PROCEDURE — 80053 COMPREHEN METABOLIC PANEL: CPT

## 2020-03-24 PROCEDURE — 87220 TISSUE EXAM FOR FUNGI: CPT

## 2020-03-24 PROCEDURE — 87210 SMEAR WET MOUNT SALINE/INK: CPT

## 2020-03-24 RX ORDER — 0.9 % SODIUM CHLORIDE 0.9 %
1000 INTRAVENOUS SOLUTION INTRAVENOUS ONCE
Status: COMPLETED | OUTPATIENT
Start: 2020-03-24 | End: 2020-03-24

## 2020-03-24 RX ORDER — KETOROLAC TROMETHAMINE 30 MG/ML
15 INJECTION, SOLUTION INTRAMUSCULAR; INTRAVENOUS ONCE
Status: COMPLETED | OUTPATIENT
Start: 2020-03-24 | End: 2020-03-24

## 2020-03-24 RX ORDER — FLUCONAZOLE 100 MG/1
100 TABLET ORAL DAILY
Qty: 7 TABLET | Refills: 0 | Status: SHIPPED | OUTPATIENT
Start: 2020-03-24 | End: 2020-03-31

## 2020-03-24 RX ADMIN — SODIUM CHLORIDE 1000 ML: 9 INJECTION, SOLUTION INTRAVENOUS at 14:52

## 2020-03-24 RX ADMIN — Medication 10 UNITS: at 16:12

## 2020-03-24 RX ADMIN — KETOROLAC TROMETHAMINE 15 MG: 30 INJECTION, SOLUTION INTRAMUSCULAR at 17:40

## 2020-03-24 ASSESSMENT — PAIN DESCRIPTION - LOCATION
LOCATION: VAGINA

## 2020-03-24 ASSESSMENT — PAIN DESCRIPTION - PAIN TYPE
TYPE: ACUTE PAIN

## 2020-03-24 ASSESSMENT — PAIN DESCRIPTION - FREQUENCY
FREQUENCY: CONTINUOUS
FREQUENCY: CONTINUOUS

## 2020-03-24 ASSESSMENT — PAIN SCALES - GENERAL
PAINLEVEL_OUTOF10: 5
PAINLEVEL_OUTOF10: 5

## 2020-03-24 ASSESSMENT — ENCOUNTER SYMPTOMS
SHORTNESS OF BREATH: 0
COLOR CHANGE: 0
BACK PAIN: 0
ABDOMINAL PAIN: 0
ABDOMINAL DISTENTION: 0
COUGH: 0

## 2020-03-24 ASSESSMENT — PAIN DESCRIPTION - DESCRIPTORS
DESCRIPTORS: BURNING
DESCRIPTORS: BURNING

## 2020-03-24 NOTE — ED PROVIDER NOTES
I performed a pelvic exam for Alejandra Iyer NP. External inspection: Significant erythema with white plaques noted to the vulvovaginal area extending past the labia to the groin and proximal inner thighs. Speculum exam: Vaginal walls were intact with no lesions, nodules, or edema. Cervix was visualized. There was no friability, active bleeding, or inflammation. Mild amount of old blood noted in the vaginal vault. Patient tolerated the exam well. Cultures were obtained and sent to lab.   Exam discussed with Alejandra Iyer NP.        Elise Holloway PA-C  03/24/20 5399

## 2020-03-24 NOTE — ED NOTES
[facility]  Transfer-in Template    S: Paula Quiñonez is a 52 y.o. female being transferred in from [address] in [place] for further evaluation of hyperglycemia    Report received from [facility] at 2:07 PM Pamela Gutierrez RN.          R: Patient being transferred by  [facility]    Decision was made to transfer for/due to: hyperglycemia    BP: 134/78   HR: 95  Resp: 18  Temp: 96.1  O2: [address], RN  03/24/20 1409

## 2020-03-24 NOTE — ED NOTES
In for hourly rounding. Pt resting with lights dimmed. Pt requesting something for pain in vaginal region. Will speak with provider. Pt remains A&Ox4, resps easy and unlabored. Pt states insulin and fluids have helped. Will continue to monitor pt.      Suzette Hammond RN  03/24/20 2748

## 2020-03-24 NOTE — ED PROVIDER NOTES
surgical history on file. CURRENT MEDICATIONS       Discharge Medication List as of 3/24/2020  6:12 PM      CONTINUE these medications which have NOT CHANGED    Details   atomoxetine (STRATTERA) 25 MG capsule Take 25 mg by mouth dailyHistorical Med      citalopram (CELEXA) 40 MG tablet Take 40 mg by mouth nightlyHistorical Med      insulin aspart (NOVOLOG) 100 UNIT/ML injection vial Inject 15 Units into the skin 3 times daily (before meals)Historical Med      insulin glargine (BASAGLAR KWIKPEN) 100 UNIT/ML injection pen Inject 20 Units into the skin 2 times dailyHistorical Med      metoprolol tartrate (LOPRESSOR) 25 MG tablet Take 1 tablet by mouth 2 times daily, Disp-60 tablet, R-3Normal      Zqcnfa-QxZxa-KqLgkc-FA-Omega (MULTIVITAMIN/MINERALS PO) Take 1 tablet by mouth dailyHistorical Med      acetaminophen (TYLENOL) 325 MG tablet Take 650 mg by mouth every 4 hours as neededHistorical Med      aspirin 81 MG chewable tablet Take 81 mg by mouth dailyHistorical Med      atorvastatin (LIPITOR) 20 MG tablet Take 1 tablet by mouth nightlyHistorical Med      divalproex (DEPAKOTE) 125 MG DR tablet Take 1 tablet by mouth 2 times daily (with meals), [id number] Med      TRULICITY 6.16 RY/8.7VK SOPN Inject 1 pen into the skin every 7 days On fridays, R-3, DAWHistorical Med      gabapentin (NEURONTIN) 300 MG capsule Take 300 mg by mouth 3 times daily. Historical Med      lansoprazole (PREVACID) 15 MG delayed release capsule Take 15 mg by mouth every morning (before breakfast)Historical Med      levothyroxine (SYNTHROID) 50 MCG tablet Take 50 mcg by mouth every morning (before breakfast)Historical Med      losartan (COZAAR) 25 MG tablet Take 25 mg by mouth every morning (before breakfast)Historical Med      meclizine (ANTIVERT) 12.5 MG tablet Take 12.5 mg by mouth every morning (before breakfast)Historical Med      Melatonin 3-10 MG TABS Take 6 mg by mouth nightlyHistorical Med      minocycline (MINOCIN;DYNACIN) 100 MG capsule Take 1 capsule by mouth 2 times daily, R-2Historical Med      mirtazapine (REMERON) 15 MG tablet Take 1 tablet by mouth nightly, R-1Historical Med      OLANZapine (ZYPREXA) 5 MG tablet Take 1 tablet by mouth nightly, R-0Historical Med      ondansetron (ZOFRAN-ODT) 4 MG disintegrating tablet Take 4 mg by mouth every 6 hours as neededHistorical Med      torsemide (DEMADEX) 20 MG tablet Take 1 tablet by mouth daily, R-1Historical Med      venlafaxine (EFFEXOR XR) 75 MG extended release capsule Take 1 capsule by mouth daily, R-0Historical Med             ALLERGIES     is allergic to meperidine; meperidine hcl; nitroglycerin; pregabalin; and promethazine. FAMILY HISTORY     has no family status information on file. family history is not on file. SOCIAL HISTORY          PHYSICAL EXAM     INITIAL VITALS:  height is 5' 5\" (1.651 m) and weight is 244 lb (110.7 kg). Her oral temperature is 97.9 °F (36.6 °C). Her blood pressure is 150/87 (abnormal) and her pulse is 93. Her respiration is 18 and oxygen saturation is 98%. Physical Exam  Constitutional:       General: She is not in acute distress. Appearance: She is obese. She is not ill-appearing or toxic-appearing. HENT:      Head: Normocephalic. Nose: Nose normal.      Mouth/Throat:      Mouth: Mucous membranes are moist.   Eyes:      Pupils: Pupils are equal, round, and reactive to light. Cardiovascular:      Rate and Rhythm: Normal rate and regular rhythm. Pulses: Normal pulses. Heart sounds: Normal heart sounds. Pulmonary:      Effort: Pulmonary effort is normal.      Breath sounds: Normal breath sounds. Abdominal:      General: Abdomen is flat. Bowel sounds are normal. There is no distension. Genitourinary:     Comments: Pelvic exam per Mindy Farooq PA-C. Please see her note for pelvic exam assessment findings  Skin:     General: Skin is warm and dry. Capillary Refill: Capillary refill takes less than 2 seconds. Neurological:      General: No focal deficit present. Mental Status: She is alert. Psychiatric:         Mood and Affect: Mood normal.         Behavior: Behavior normal.          DIFFERENTIAL DIAGNOSIS:   Medication nonadherence/noncompliance, hyperglycemia, HHNK, DKA    DIAGNOSTIC RESULTS     EKG: All EKG's are interpreted by the Emergency Department Physician who either signs or Co-signs this chart in the absence of a cardiologist.    Normal sinus rhythm ventricular rate 85 bpm.  NY interval 150, QRS duration 118, corrected  ms.     RADIOLOGY: non-plainfilm images(s) such as CT, Ultrasound and MRI are read by the radiologist.    No orders to display       LABS:     Labs Reviewed   COMPREHENSIVE METABOLIC PANEL - Abnormal; Notable for the following components:       Result Value    Glucose 711 (*)     Sodium 124 (*)     Chloride 88 (*)     CO2 20 (*)      (*)     Alkaline Phosphatase 302 (*)     Total Bilirubin 0.2 (*)      (*)     All other components within normal limits   URINE RT REFLEX TO CULTURE - Abnormal; Notable for the following components:    Glucose, Ur >= 1000 (*)     All other components within normal limits   POCT GLUCOSE - Abnormal; Notable for the following components:    POC Glucose >600 (*)     All other components within normal limits   POCT GLUCOSE - Abnormal; Notable for the following components:    POC Glucose 503 (*)     All other components within normal limits   POCT GLUCOSE - Abnormal; Notable for the following components:    POC Glucose 474 (*)     All other components within normal limits   POCT GLUCOSE - Abnormal; Notable for the following components:    POC Glucose 398 (*)     All other components within normal limits   KOH (SKIN,HAIR,NAILS)    Narrative:     Source: vaginal non-OB patient       Site:           Current Antibiotics: none   WET PREP, GENITAL    Narrative:     Source: vaginal non-OB patient       Site:           Current Antibiotics: none   C. Patient was given oral dose of Diflucan times one +7-day course of Monistat to see if this improves her symptoms. She is advised to follow-up with her primary care provider if she has no improvement with the symptoms. Patient verbalized understanding. She will be discharged. Return to the emergency department for worsening symptoms. Follow-up with her PCP next week. CRITICAL CARE:   None    CONSULTS:  None    PROCEDURES:  NOne    FINAL IMPRESSION      1. Hyperglycemia    2. Medically noncompliant    3. Vaginitis and vulvovaginitis          DISPOSITION/PLAN   Discharge    PATIENT REFERRED TO:  No follow-up provider specified. DISCHARGE MEDICATIONS:  Discharge Medication List as of 3/24/2020  6:12 PM      START taking these medications    Details   fluconazole (DIFLUCAN) 100 MG tablet Take 1 tablet by mouth daily for 7 days, Disp-7 tablet, R-0Print      miconazole (MONISTAT 7 SIMPLY CURE) 2 % vaginal cream Place vaginally nightly., Disp-1 Tube, R-0Print             (Please note that portions of this note were completed with a voice recognition program.  Efforts were made to edit the dictations but occasionally words are mis-transcribed.)    The patient was given an opportunity to see the Emergency Attending. The patient voiced understanding that I was a Mid-LevelProvider and was in agreement with being seen independently by myself.           WAGNER Bourgeois - CNP  03/24/20 1942

## 2020-03-24 NOTE — ED NOTES
Pt resting comfortably in bed. Urine specimen collected and sent to lab. Vaginal exam performed per Dina HORTON accompanied by this RN. Pt medicated per MAR. Pt continues to be A&Ox4, resps easy and unlabored. Call light in reach. Will continue to monitor.      Calvin Pennington RN  03/24/20 2651

## 2020-03-25 ENCOUNTER — CARE COORDINATION (OUTPATIENT)
Dept: CARE COORDINATION | Age: 50
End: 2020-03-25

## 2020-03-25 LAB
C. TRACHOMATIS DNA ,URINE: NEGATIVE
CULTURE: NORMAL
Lab: NORMAL
N. GONORRHOEAE DNA, URINE: NEGATIVE
SOURCE: NORMAL
SPECIMEN DESCRIPTION: NORMAL
SPECIMEN DESCRIPTION: NORMAL
TRICHOMONAS VAGINALI, MOLECULAR: NEGATIVE

## 2020-03-26 NOTE — CARE COORDINATION
Second attempt to reach patient for Ed follow up and COVID education. Patient was unavailable at the time of my call, and I am unable to leave a message.

## 2020-03-27 LAB
GENITAL CULTURE, ROUTINE: NORMAL
GRAM STAIN RESULT: NORMAL

## 2020-06-23 ENCOUNTER — APPOINTMENT (OUTPATIENT)
Dept: GENERAL RADIOLOGY | Age: 50
End: 2020-06-23
Payer: MEDICAID

## 2020-06-23 ENCOUNTER — HOSPITAL ENCOUNTER (EMERGENCY)
Age: 50
Discharge: HOME | End: 2020-06-23
Payer: MEDICAID

## 2020-06-23 VITALS
HEART RATE: 76 BPM | HEIGHT: 65 IN | DIASTOLIC BLOOD PRESSURE: 59 MMHG | BODY MASS INDEX: 35.99 KG/M2 | RESPIRATION RATE: 20 BRPM | OXYGEN SATURATION: 94 % | WEIGHT: 216 LBS | SYSTOLIC BLOOD PRESSURE: 122 MMHG | TEMPERATURE: 98 F

## 2020-06-23 LAB
ALBUMIN SERPL-MCNC: 3.5 G/DL (ref 3.5–5.1)
ALP BLD-CCNC: 269 U/L (ref 38–126)
ALT SERPL-CCNC: 96 U/L (ref 11–66)
ANION GAP SERPL CALCULATED.3IONS-SCNC: 14 MEQ/L (ref 8–16)
AST SERPL-CCNC: 104 U/L (ref 5–40)
BASOPHILS # BLD: 0.2 %
BASOPHILS ABSOLUTE: 0 THOU/MM3 (ref 0–0.1)
BILIRUB SERPL-MCNC: 0.5 MG/DL (ref 0.3–1.2)
BUN BLDV-MCNC: 14 MG/DL (ref 7–22)
CALCIUM SERPL-MCNC: 8.5 MG/DL (ref 8.5–10.5)
CHLORIDE BLD-SCNC: 95 MEQ/L (ref 98–111)
CO2: 23 MEQ/L (ref 23–33)
CREAT SERPL-MCNC: 0.8 MG/DL (ref 0.4–1.2)
EKG ATRIAL RATE: 92 BPM
EKG P AXIS: 69 DEGREES
EKG P-R INTERVAL: 142 MS
EKG Q-T INTERVAL: 382 MS
EKG QRS DURATION: 110 MS
EKG QTC CALCULATION (BAZETT): 472 MS
EKG R AXIS: -61 DEGREES
EKG T AXIS: 97 DEGREES
EKG VENTRICULAR RATE: 92 BPM
EOSINOPHIL # BLD: 2.2 %
EOSINOPHILS ABSOLUTE: 0.2 THOU/MM3 (ref 0–0.4)
ERYTHROCYTE [DISTWIDTH] IN BLOOD BY AUTOMATED COUNT: 12.5 % (ref 11.5–14.5)
ERYTHROCYTE [DISTWIDTH] IN BLOOD BY AUTOMATED COUNT: 39.8 FL (ref 35–45)
GFR SERPL CREATININE-BSD FRML MDRD: 76 ML/MIN/1.73M2
GLUCOSE BLD-MCNC: 476 MG/DL (ref 70–108)
HCT VFR BLD CALC: 42.5 % (ref 37–47)
HEMOGLOBIN: 14.6 GM/DL (ref 12–16)
IMMATURE GRANS (ABS): 0.02 THOU/MM3 (ref 0–0.07)
IMMATURE GRANULOCYTES: 0.2 %
LACTIC ACID: 3.1 MMOL/L (ref 0.5–2.2)
LIPASE: 43.9 U/L (ref 5.6–51.3)
LYMPHOCYTES # BLD: 28 %
LYMPHOCYTES ABSOLUTE: 2.3 THOU/MM3 (ref 1–4.8)
MCH RBC QN AUTO: 30.2 PG (ref 26–33)
MCHC RBC AUTO-ENTMCNC: 34.4 GM/DL (ref 32.2–35.5)
MCV RBC AUTO: 87.8 FL (ref 81–99)
MONOCYTES # BLD: 6.3 %
MONOCYTES ABSOLUTE: 0.5 THOU/MM3 (ref 0.4–1.3)
NUCLEATED RED BLOOD CELLS: 0 /100 WBC
OSMOLALITY CALCULATION: 286 MOSMOL/KG (ref 275–300)
PLATELET # BLD: 260 THOU/MM3 (ref 130–400)
PMV BLD AUTO: 10.1 FL (ref 9.4–12.4)
POTASSIUM REFLEX MAGNESIUM: 4 MEQ/L (ref 3.5–5.2)
PRO-BNP: 124.8 PG/ML (ref 0–450)
RBC # BLD: 4.84 MILL/MM3 (ref 4.2–5.4)
SEG NEUTROPHILS: 63.1 %
SEGMENTED NEUTROPHILS ABSOLUTE COUNT: 5.1 THOU/MM3 (ref 1.8–7.7)
SODIUM BLD-SCNC: 132 MEQ/L (ref 135–145)
TOTAL PROTEIN: 6.5 G/DL (ref 6.1–8)
TROPONIN T: < 0.01 NG/ML
WBC # BLD: 8.1 THOU/MM3 (ref 4.8–10.8)

## 2020-06-23 PROCEDURE — 71045 X-RAY EXAM CHEST 1 VIEW: CPT

## 2020-06-23 PROCEDURE — 84484 ASSAY OF TROPONIN QUANT: CPT

## 2020-06-23 PROCEDURE — 83605 ASSAY OF LACTIC ACID: CPT

## 2020-06-23 PROCEDURE — 93005 ELECTROCARDIOGRAM TRACING: CPT | Performed by: PHYSICIAN ASSISTANT

## 2020-06-23 PROCEDURE — 80053 COMPREHEN METABOLIC PANEL: CPT

## 2020-06-23 PROCEDURE — 83690 ASSAY OF LIPASE: CPT

## 2020-06-23 PROCEDURE — 96372 THER/PROPH/DIAG INJ SC/IM: CPT

## 2020-06-23 PROCEDURE — 96360 HYDRATION IV INFUSION INIT: CPT

## 2020-06-23 PROCEDURE — 93010 ELECTROCARDIOGRAM REPORT: CPT | Performed by: INTERNAL MEDICINE

## 2020-06-23 PROCEDURE — 2580000003 HC RX 258: Performed by: PHYSICIAN ASSISTANT

## 2020-06-23 PROCEDURE — 36415 COLL VENOUS BLD VENIPUNCTURE: CPT

## 2020-06-23 PROCEDURE — 83880 ASSAY OF NATRIURETIC PEPTIDE: CPT

## 2020-06-23 PROCEDURE — 99284 EMERGENCY DEPT VISIT MOD MDM: CPT

## 2020-06-23 PROCEDURE — 6360000002 HC RX W HCPCS: Performed by: PHYSICIAN ASSISTANT

## 2020-06-23 PROCEDURE — 85025 COMPLETE CBC W/AUTO DIFF WBC: CPT

## 2020-06-23 PROCEDURE — 6370000000 HC RX 637 (ALT 250 FOR IP): Performed by: PHYSICIAN ASSISTANT

## 2020-06-23 RX ORDER — 0.9 % SODIUM CHLORIDE 0.9 %
1000 INTRAVENOUS SOLUTION INTRAVENOUS ONCE
Status: COMPLETED | OUTPATIENT
Start: 2020-06-23 | End: 2020-06-23

## 2020-06-23 RX ORDER — ASPIRIN 81 MG/1
243 TABLET, CHEWABLE ORAL ONCE
Status: COMPLETED | OUTPATIENT
Start: 2020-06-23 | End: 2020-06-23

## 2020-06-23 RX ORDER — KETOROLAC TROMETHAMINE 30 MG/ML
30 INJECTION, SOLUTION INTRAMUSCULAR; INTRAVENOUS ONCE
Status: COMPLETED | OUTPATIENT
Start: 2020-06-23 | End: 2020-06-23

## 2020-06-23 RX ADMIN — KETOROLAC TROMETHAMINE 30 MG: 30 INJECTION, SOLUTION INTRAMUSCULAR at 19:34

## 2020-06-23 RX ADMIN — ASPIRIN 243 MG: 81 TABLET, CHEWABLE ORAL at 16:11

## 2020-06-23 RX ADMIN — SODIUM CHLORIDE 1000 ML: 9 INJECTION, SOLUTION INTRAVENOUS at 16:13

## 2020-06-23 ASSESSMENT — ENCOUNTER SYMPTOMS
COUGH: 0
CHEST TIGHTNESS: 0
EYE REDNESS: 0
SINUS PAIN: 0
SINUS PRESSURE: 0
SHORTNESS OF BREATH: 0
RHINORRHEA: 0
SORE THROAT: 0
BACK PAIN: 0
ABDOMINAL PAIN: 0
EYE PAIN: 0
CONSTIPATION: 0
PHOTOPHOBIA: 0
WHEEZING: 0
NAUSEA: 0
VOMITING: 0
DIARRHEA: 0

## 2020-06-23 ASSESSMENT — PAIN SCALES - GENERAL
PAINLEVEL_OUTOF10: 3
PAINLEVEL_OUTOF10: 5
PAINLEVEL_OUTOF10: 3

## 2020-06-23 ASSESSMENT — PAIN DESCRIPTION - DESCRIPTORS: DESCRIPTORS: ACHING

## 2020-06-23 ASSESSMENT — PAIN DESCRIPTION - PAIN TYPE: TYPE: ACUTE PAIN

## 2020-06-23 ASSESSMENT — HEART SCORE: ECG: 0

## 2020-06-23 ASSESSMENT — PAIN DESCRIPTION - LOCATION: LOCATION: SHOULDER;NECK

## 2020-06-23 NOTE — ED NOTES
Presents to ER with complaints of left shoulder and neck pain that has lead to chest pain. PT states this has been ongoing for 3 days and has been unable to get checked out due to not having a ride. Pt states she has had 3 heart attacks and had open heart surgery in 2012 in [address]. Pt rates pain a 3 out of 10 in severity. Respirations unlabored. EKG completed.      Antonino Combs RN  06/23/20 0819

## 2020-06-23 NOTE — ED NOTES
Pt resting in bed on cell phone at this time. Respirations unlabored. Call light in reach.      Ivan Meza RN  06/23/20 1996

## 2020-06-23 NOTE — ED PROVIDER NOTES
History reviewed. No pertinent family history. SOCIAL HISTORY       Social History     Socioeconomic History    Marital status:      Spouse name: None    Number of children: None    Years of education: None    Highest education level: None   Occupational History    None   Social Needs    Financial resource strain: None    Food insecurity     Worry: None     Inability: None    Transportation needs     Medical: None     Non-medical: None   Tobacco Use    Smoking status: Current Some Day Smoker    Smokeless tobacco: Never Used   Substance and Sexual Activity    Alcohol use: Not Currently    Drug use: None    Sexual activity: Not Currently   Lifestyle    Physical activity     Days per week: None     Minutes per session: None    Stress: None   Relationships    Social connections     Talks on phone: None     Gets together: None     Attends Voodoo service: None     Active member of club or organization: None     Attends meetings of clubs or organizations: None     Relationship status: None    Intimate partner violence     Fear of current or ex partner: None     Emotionally abused: None     Physically abused: None     Forced sexual activity: None   Other Topics Concern    None   Social History Narrative    None       REVIEW OF SYSTEMS     Review of Systems   Constitutional: Negative for chills, diaphoresis and fever. HENT: Negative for congestion, ear pain, rhinorrhea, sinus pressure, sinus pain and sore throat. Eyes: Negative for photophobia, pain and redness. Respiratory: Negative for cough, chest tightness, shortness of breath and wheezing. Cardiovascular: Positive for chest pain (Left side CP radiating to left shoulder). Negative for palpitations and leg swelling. Gastrointestinal: Negative for abdominal pain, constipation, diarrhea, nausea and vomiting. Genitourinary: Negative for dysuria, frequency, hematuria and urgency.    Musculoskeletal: Positive for arthralgias (Left shoulder pain) and neck pain (posterior). Negative for back pain and myalgias. Skin: Negative for rash. Neurological: Negative for dizziness, weakness, light-headedness, numbness and headaches. Psychiatric/Behavioral: Negative for behavioral problems. The patient is not nervous/anxious. Except as noted above the remainder of the review of systems was reviewed and is. PHYSICAL EXAM    (up to 7 for level 4, 8 or more for level 5)     ED Triage Vitals [06/23/20 1535]   BP Temp Temp Source Pulse Resp SpO2 Height Weight   122/71 98 °F (36.7 °C) Oral 91 20 96 % 5' 5\" (1.651 m) 216 lb (98 kg)       Physical Exam  Vitals signs and nursing note reviewed. Constitutional:       Appearance: Normal appearance. She is not ill-appearing. HENT:      Head: Normocephalic and atraumatic. Right Ear: External ear normal.      Left Ear: External ear normal.      Nose: Nose normal.   Eyes:      Extraocular Movements: Extraocular movements intact. Pupils: Pupils are equal, round, and reactive to light. Neck:      Musculoskeletal: Normal range of motion. Muscular tenderness (TTP of the posterior lateral cervical spine) present. No neck rigidity. Cardiovascular:      Rate and Rhythm: Normal rate and regular rhythm. Pulses: Normal pulses. Heart sounds: Normal heart sounds. No murmur. No friction rub. No gallop. Pulmonary:      Effort: Pulmonary effort is normal. No respiratory distress. Breath sounds: Normal breath sounds. No wheezing or rales. Chest:      Chest wall: No tenderness. Abdominal:      General: Abdomen is flat. There is no distension. Palpations: Abdomen is soft. Tenderness: There is no abdominal tenderness. There is no guarding or rebound. Musculoskeletal: Normal range of motion. General: No swelling or signs of injury. Right lower leg: No edema. Left lower leg: No edema. Skin:     General: Skin is warm and dry.       Coloration: Skin is complaint of neck pain and left shoulder pain for the past 3 days with the recent development of left-sided CP. On initial evaluation patient was in no acute distress with stable vitals. Due to pt's Hx and report of CP, she was initially provided 3 additional ASA as pt reportedly already took her regular 81mg. Patient was then reporting left-sided posterior lateral cervical pain which was reproducible with palpation which extended into her left shoulder as well as into her anterior chest.  Out of concern for possible cardiac etiology appropriate diagnostic imaging and labs were obtained which were grossly unremarkable. Troponin neg. ECG neg. HEART score 2. Do not believe pt's pain is cardiac in nature. Patient was noted to be hyperglycemic for which she states she has been recently uncontrolled with her blood sugars due to noncompliance with home medications, which is why she states she was recently admitted for BG>1000. Recommended pt take her regularly scheduled insulin for which she was agreeable. Pt's mildly elevated lactate is likely d/t pt's T2DM noncompliance and dehydration. Do not believe pt is in DKA. Pt will be safe for oral hydration and resuming home T2DM medications. Suspect patient's symptoms are musculoskeletal in nature for which she was provided dose of Toradol. Patient reported significant improvement in symptoms and was requesting discharge home. Recommended patient follow-up with cardiology tomorrow to due to her reported CP for which she was agreeable. VSS throughout ED course and prior to discharge. Recommend ibuprofen PRN for pain. Diagnostics and recommended plan of care were reviewed with the patient for which she demonstrated understanding and was agreeable. Educated on signs and symptoms to return to the ED such as worsening chest pain, shortness of breath, patient will be discharged home with cardiology and PCP follow-up.     Strict return precautions and follow up instructions

## 2020-06-24 ENCOUNTER — CARE COORDINATION (OUTPATIENT)
Dept: CARE COORDINATION | Age: 50
End: 2020-06-24

## 2020-06-24 NOTE — CARE COORDINATION
Final attempt to reach patient for covid-19 f/u s/p recent ED visit for c/o chest pain and SOB. Patient was not available at the time of my call and generic voicemail message was left asking patient to please return call to my direct number.

## 2020-06-24 NOTE — ED NOTES
Pt states pain medication helped with her pain. PT respirations easy and unlabored. Pt verbalized understanding of discharge paperwork. Pt denies any further needs at this time.       Patti Bernstein RN  06/23/20 2015

## 2020-07-26 ENCOUNTER — APPOINTMENT (OUTPATIENT)
Dept: GENERAL RADIOLOGY | Age: 50
End: 2020-07-26
Payer: MEDICAID

## 2020-07-26 ENCOUNTER — APPOINTMENT (OUTPATIENT)
Dept: CT IMAGING | Age: 50
End: 2020-07-26
Payer: MEDICAID

## 2020-07-26 ENCOUNTER — HOSPITAL ENCOUNTER (OUTPATIENT)
Age: 50
Setting detail: OBSERVATION
Discharge: HOME | End: 2020-08-04
Attending: FAMILY MEDICINE | Admitting: FAMILY MEDICINE
Payer: MEDICAID

## 2020-07-26 PROBLEM — R73.9 HYPERGLYCEMIA: Status: ACTIVE | Noted: 2020-07-26

## 2020-07-26 LAB
ALBUMIN SERPL-MCNC: 3.9 G/DL (ref 3.5–5.1)
ALP BLD-CCNC: 223 U/L (ref 38–126)
ALT SERPL-CCNC: 71 U/L (ref 11–66)
AMPHETAMINE+METHAMPHETAMINE URINE SCREEN: POSITIVE
ANION GAP SERPL CALCULATED.3IONS-SCNC: 17 MEQ/L (ref 8–16)
AST SERPL-CCNC: 75 U/L (ref 5–40)
BARBITURATE QUANTITATIVE URINE: NEGATIVE
BASE EXCESS MIXED: -0.4 MMOL/L (ref -2–3)
BASOPHILS # BLD: 0.4 %
BASOPHILS ABSOLUTE: 0 THOU/MM3 (ref 0–0.1)
BENZODIAZEPINE QUANTITATIVE URINE: NEGATIVE
BETA-HYDROXYBUTYRATE: 1.29 MG/DL (ref 0.2–2.81)
BILIRUB SERPL-MCNC: 0.7 MG/DL (ref 0.3–1.2)
BILIRUBIN URINE: NEGATIVE
BLOOD, URINE: NEGATIVE
BUN BLDV-MCNC: 14 MG/DL (ref 7–22)
CALCIUM SERPL-MCNC: 9.2 MG/DL (ref 8.5–10.5)
CANNABINOID QUANTITATIVE URINE: NEGATIVE
CHARACTER, URINE: CLEAR
CHLORIDE BLD-SCNC: 90 MEQ/L (ref 98–111)
CO2: 23 MEQ/L (ref 23–33)
COCAINE METABOLITE QUANTITATIVE URINE: NEGATIVE
COLLECTED BY:: ABNORMAL
COLOR: YELLOW
CREAT SERPL-MCNC: 0.9 MG/DL (ref 0.4–1.2)
DEVICE: ABNORMAL
EOSINOPHIL # BLD: 1.3 %
EOSINOPHILS ABSOLUTE: 0.1 THOU/MM3 (ref 0–0.4)
ERYTHROCYTE [DISTWIDTH] IN BLOOD BY AUTOMATED COUNT: 12.8 % (ref 11.5–14.5)
ERYTHROCYTE [DISTWIDTH] IN BLOOD BY AUTOMATED COUNT: 41.5 FL (ref 35–45)
ETHYL ALCOHOL, SERUM: < 0.01 %
GFR SERPL CREATININE-BSD FRML MDRD: 66 ML/MIN/1.73M2
GLUCOSE BLD-MCNC: 239 MG/DL (ref 70–108)
GLUCOSE BLD-MCNC: 737 MG/DL (ref 70–108)
GLUCOSE URINE: >= 1000 MG/DL
HCO3, MIXED: 25 MMOL/L (ref 23–28)
HCT VFR BLD CALC: 42.6 % (ref 37–47)
HEMOGLOBIN: 14.2 GM/DL (ref 12–16)
IMMATURE GRANS (ABS): 0.02 THOU/MM3 (ref 0–0.07)
IMMATURE GRANULOCYTES: 0.2 %
KETONES, URINE: NEGATIVE
LEUKOCYTE ESTERASE, URINE: NEGATIVE
LIPASE: 23.6 U/L (ref 5.6–51.3)
LYMPHOCYTES # BLD: 28.3 %
LYMPHOCYTES ABSOLUTE: 2.7 THOU/MM3 (ref 1–4.8)
MCH RBC QN AUTO: 30.1 PG (ref 26–33)
MCHC RBC AUTO-ENTMCNC: 33.3 GM/DL (ref 32.2–35.5)
MCV RBC AUTO: 90.3 FL (ref 81–99)
MONOCYTES # BLD: 7.9 %
MONOCYTES ABSOLUTE: 0.8 THOU/MM3 (ref 0.4–1.3)
NITRITE, URINE: NEGATIVE
NUCLEATED RED BLOOD CELLS: 0 /100 WBC
O2 SAT, MIXED: 77 %
OPIATES, URINE: NEGATIVE
OSMOLALITY CALCULATION: 296.7 MOSMOL/KG (ref 275–300)
OXYCODONE: NEGATIVE
PCO2, MIXED VENOUS: 41 MMHG (ref 41–51)
PH UA: 5.5 (ref 5–9)
PH, MIXED: 7.39 (ref 7.31–7.41)
PHENCYCLIDINE QUANTITATIVE URINE: NEGATIVE
PLATELET # BLD: 243 THOU/MM3 (ref 130–400)
PMV BLD AUTO: 10.6 FL (ref 9.4–12.4)
PO2 MIXED: 42 MMHG (ref 25–40)
POTASSIUM REFLEX MAGNESIUM: 3.9 MEQ/L (ref 3.5–5.2)
PROTEIN UA: NEGATIVE
RBC # BLD: 4.72 MILL/MM3 (ref 4.2–5.4)
SEG NEUTROPHILS: 61.9 %
SEGMENTED NEUTROPHILS ABSOLUTE COUNT: 6 THOU/MM3 (ref 1.8–7.7)
SODIUM BLD-SCNC: 130 MEQ/L (ref 135–145)
SPECIFIC GRAVITY, URINE: > 1.03 (ref 1–1.03)
TOTAL PROTEIN: 6.8 G/DL (ref 6.1–8)
TROPONIN T: < 0.01 NG/ML
TSH SERPL DL<=0.05 MIU/L-ACNC: 4.46 UIU/ML (ref 0.4–4.2)
UROBILINOGEN, URINE: 0.2 EU/DL (ref 0–1)
WBC # BLD: 9.7 THOU/MM3 (ref 4.8–10.8)

## 2020-07-26 PROCEDURE — 85025 COMPLETE CBC W/AUTO DIFF WBC: CPT

## 2020-07-26 PROCEDURE — 96361 HYDRATE IV INFUSION ADD-ON: CPT

## 2020-07-26 PROCEDURE — 99285 EMERGENCY DEPT VISIT HI MDM: CPT

## 2020-07-26 PROCEDURE — 2060000000 HC ICU INTERMEDIATE R&B

## 2020-07-26 PROCEDURE — G0378 HOSPITAL OBSERVATION PER HR: HCPCS

## 2020-07-26 PROCEDURE — 80307 DRUG TEST PRSMV CHEM ANLYZR: CPT

## 2020-07-26 PROCEDURE — 80074 ACUTE HEPATITIS PANEL: CPT

## 2020-07-26 PROCEDURE — 6360000004 HC RX CONTRAST MEDICATION: Performed by: NURSE PRACTITIONER

## 2020-07-26 PROCEDURE — 99222 1ST HOSP IP/OBS MODERATE 55: CPT | Performed by: PHYSICIAN ASSISTANT

## 2020-07-26 PROCEDURE — 93005 ELECTROCARDIOGRAM TRACING: CPT | Performed by: EMERGENCY MEDICINE

## 2020-07-26 PROCEDURE — 82010 KETONE BODYS QUAN: CPT

## 2020-07-26 PROCEDURE — 84484 ASSAY OF TROPONIN QUANT: CPT

## 2020-07-26 PROCEDURE — 96374 THER/PROPH/DIAG INJ IV PUSH: CPT

## 2020-07-26 PROCEDURE — 71275 CT ANGIOGRAPHY CHEST: CPT

## 2020-07-26 PROCEDURE — 71045 X-RAY EXAM CHEST 1 VIEW: CPT

## 2020-07-26 PROCEDURE — 36415 COLL VENOUS BLD VENIPUNCTURE: CPT

## 2020-07-26 PROCEDURE — 6370000000 HC RX 637 (ALT 250 FOR IP): Performed by: NURSE PRACTITIONER

## 2020-07-26 PROCEDURE — G0480 DRUG TEST DEF 1-7 CLASSES: HCPCS

## 2020-07-26 PROCEDURE — 84439 ASSAY OF FREE THYROXINE: CPT

## 2020-07-26 PROCEDURE — 80053 COMPREHEN METABOLIC PANEL: CPT

## 2020-07-26 PROCEDURE — 96375 TX/PRO/DX INJ NEW DRUG ADDON: CPT

## 2020-07-26 PROCEDURE — 2580000003 HC RX 258: Performed by: PHYSICIAN ASSISTANT

## 2020-07-26 PROCEDURE — 87389 HIV-1 AG W/HIV-1&-2 AB AG IA: CPT

## 2020-07-26 PROCEDURE — 70450 CT HEAD/BRAIN W/O DYE: CPT

## 2020-07-26 PROCEDURE — 82948 REAGENT STRIP/BLOOD GLUCOSE: CPT

## 2020-07-26 PROCEDURE — 83690 ASSAY OF LIPASE: CPT

## 2020-07-26 PROCEDURE — 87522 HEPATITIS C REVRS TRNSCRPJ: CPT

## 2020-07-26 PROCEDURE — 81003 URINALYSIS AUTO W/O SCOPE: CPT

## 2020-07-26 PROCEDURE — 2580000003 HC RX 258: Performed by: NURSE PRACTITIONER

## 2020-07-26 PROCEDURE — 84443 ASSAY THYROID STIM HORMONE: CPT

## 2020-07-26 RX ORDER — VENLAFAXINE HYDROCHLORIDE 75 MG/1
75 CAPSULE, EXTENDED RELEASE ORAL DAILY
Status: DISCONTINUED | OUTPATIENT
Start: 2020-07-27 | End: 2020-08-04 | Stop reason: HOSPADM

## 2020-07-26 RX ORDER — TORSEMIDE 20 MG/1
20 TABLET ORAL DAILY
Status: DISCONTINUED | OUTPATIENT
Start: 2020-07-27 | End: 2020-08-04 | Stop reason: HOSPADM

## 2020-07-26 RX ORDER — LOSARTAN POTASSIUM 25 MG/1
25 TABLET ORAL
Status: DISCONTINUED | OUTPATIENT
Start: 2020-07-27 | End: 2020-08-04 | Stop reason: HOSPADM

## 2020-07-26 RX ORDER — POTASSIUM CHLORIDE 7.45 MG/ML
10 INJECTION INTRAVENOUS PRN
Status: DISCONTINUED | OUTPATIENT
Start: 2020-07-26 | End: 2020-08-04 | Stop reason: HOSPADM

## 2020-07-26 RX ORDER — MAGNESIUM SULFATE IN WATER 40 MG/ML
2 INJECTION, SOLUTION INTRAVENOUS PRN
Status: DISCONTINUED | OUTPATIENT
Start: 2020-07-26 | End: 2020-08-04 | Stop reason: HOSPADM

## 2020-07-26 RX ORDER — SODIUM CHLORIDE 450 MG/100ML
INJECTION, SOLUTION INTRAVENOUS CONTINUOUS
Status: DISCONTINUED | OUTPATIENT
Start: 2020-07-27 | End: 2020-07-27

## 2020-07-26 RX ORDER — OLANZAPINE 5 MG/1
5 TABLET ORAL NIGHTLY
Status: DISCONTINUED | OUTPATIENT
Start: 2020-07-27 | End: 2020-08-04 | Stop reason: HOSPADM

## 2020-07-26 RX ORDER — ATORVASTATIN CALCIUM 20 MG/1
20 TABLET, FILM COATED ORAL NIGHTLY
Status: DISCONTINUED | OUTPATIENT
Start: 2020-07-27 | End: 2020-07-27

## 2020-07-26 RX ORDER — DEXTROSE AND SODIUM CHLORIDE 5; .45 G/100ML; G/100ML
INJECTION, SOLUTION INTRAVENOUS CONTINUOUS PRN
Status: DISCONTINUED | OUTPATIENT
Start: 2020-07-26 | End: 2020-07-27

## 2020-07-26 RX ORDER — DEXTROSE MONOHYDRATE 25 G/50ML
12.5 INJECTION, SOLUTION INTRAVENOUS PRN
Status: DISCONTINUED | OUTPATIENT
Start: 2020-07-26 | End: 2020-08-04 | Stop reason: HOSPADM

## 2020-07-26 RX ORDER — HEPARIN SODIUM 5000 [USP'U]/ML
5000 INJECTION, SOLUTION INTRAVENOUS; SUBCUTANEOUS EVERY 8 HOURS SCHEDULED
Status: DISCONTINUED | OUTPATIENT
Start: 2020-07-27 | End: 2020-07-28

## 2020-07-26 RX ORDER — METOPROLOL TARTRATE 50 MG/1
25 TABLET, FILM COATED ORAL 2 TIMES DAILY
Status: DISCONTINUED | OUTPATIENT
Start: 2020-07-27 | End: 2020-08-04 | Stop reason: HOSPADM

## 2020-07-26 RX ORDER — 0.9 % SODIUM CHLORIDE 0.9 %
1000 INTRAVENOUS SOLUTION INTRAVENOUS ONCE
Status: COMPLETED | OUTPATIENT
Start: 2020-07-26 | End: 2020-07-26

## 2020-07-26 RX ORDER — CITALOPRAM 40 MG/1
40 TABLET ORAL NIGHTLY
Status: DISCONTINUED | OUTPATIENT
Start: 2020-07-27 | End: 2020-07-27

## 2020-07-26 RX ORDER — LEVOTHYROXINE SODIUM 0.05 MG/1
50 TABLET ORAL
Status: DISCONTINUED | OUTPATIENT
Start: 2020-07-27 | End: 2020-08-04 | Stop reason: HOSPADM

## 2020-07-26 RX ORDER — MIRTAZAPINE 15 MG/1
15 TABLET, FILM COATED ORAL NIGHTLY
Status: DISCONTINUED | OUTPATIENT
Start: 2020-07-27 | End: 2020-08-04 | Stop reason: HOSPADM

## 2020-07-26 RX ORDER — GABAPENTIN 300 MG/1
300 CAPSULE ORAL 3 TIMES DAILY
Status: DISCONTINUED | OUTPATIENT
Start: 2020-07-27 | End: 2020-08-04 | Stop reason: HOSPADM

## 2020-07-26 RX ORDER — PANTOPRAZOLE SODIUM 40 MG/1
40 TABLET, DELAYED RELEASE ORAL
Status: DISCONTINUED | OUTPATIENT
Start: 2020-07-27 | End: 2020-08-04 | Stop reason: HOSPADM

## 2020-07-26 RX ORDER — DIVALPROEX SODIUM 125 MG/1
125 CAPSULE, COATED PELLETS ORAL 2 TIMES DAILY WITH MEALS
Status: DISCONTINUED | OUTPATIENT
Start: 2020-07-27 | End: 2020-07-27

## 2020-07-26 RX ORDER — 0.9 % SODIUM CHLORIDE 0.9 %
1000 INTRAVENOUS SOLUTION INTRAVENOUS ONCE
Status: COMPLETED | OUTPATIENT
Start: 2020-07-26 | End: 2020-07-27

## 2020-07-26 RX ORDER — ASPIRIN 81 MG/1
81 TABLET, CHEWABLE ORAL DAILY
Status: DISCONTINUED | OUTPATIENT
Start: 2020-07-27 | End: 2020-08-04 | Stop reason: HOSPADM

## 2020-07-26 RX ORDER — LANOLIN ALCOHOL/MO/W.PET/CERES
6 CREAM (GRAM) TOPICAL NIGHTLY
Status: DISCONTINUED | OUTPATIENT
Start: 2020-07-27 | End: 2020-07-27

## 2020-07-26 RX ADMIN — SODIUM CHLORIDE 1000 ML: 9 INJECTION, SOLUTION INTRAVENOUS at 23:31

## 2020-07-26 RX ADMIN — SODIUM CHLORIDE 1000 ML: 9 INJECTION, SOLUTION INTRAVENOUS at 20:43

## 2020-07-26 RX ADMIN — Medication 10 UNITS: at 21:55

## 2020-07-26 RX ADMIN — IOPAMIDOL 80 ML: 755 INJECTION, SOLUTION INTRAVENOUS at 21:25

## 2020-07-26 ASSESSMENT — PAIN DESCRIPTION - PROGRESSION: CLINICAL_PROGRESSION: GRADUALLY WORSENING

## 2020-07-26 ASSESSMENT — PAIN DESCRIPTION - FREQUENCY: FREQUENCY: CONTINUOUS

## 2020-07-26 ASSESSMENT — PAIN - FUNCTIONAL ASSESSMENT: PAIN_FUNCTIONAL_ASSESSMENT: ACTIVITIES ARE NOT PREVENTED

## 2020-07-26 ASSESSMENT — PAIN DESCRIPTION - LOCATION
LOCATION: HEAD
LOCATION: HEAD;BACK;FOOT

## 2020-07-26 ASSESSMENT — PAIN DESCRIPTION - ONSET: ONSET: ON-GOING

## 2020-07-26 ASSESSMENT — PAIN DESCRIPTION - PAIN TYPE
TYPE: ACUTE PAIN
TYPE: ACUTE PAIN

## 2020-07-26 ASSESSMENT — PAIN DESCRIPTION - DESCRIPTORS: DESCRIPTORS: ACHING;CONSTANT;DISCOMFORT

## 2020-07-26 ASSESSMENT — PAIN SCALES - GENERAL
PAINLEVEL_OUTOF10: 6
PAINLEVEL_OUTOF10: 7

## 2020-07-26 ASSESSMENT — PAIN DESCRIPTION - ORIENTATION: ORIENTATION: MID

## 2020-07-26 NOTE — ED TRIAGE NOTES
Pt to ED from home with complaints of dizziness and falls. 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 Pt states she wanted to come to the ED last night but had no one to bring her. Pt states that she relapsed last night after 1.5 years sober. Pt states that someone asked her last night and she said \"F it ill do it\". Pt denies suicidal ideation at this time. Pt vital signs stable and respirations unlabored. Pt has walking boot on left leg for previous injury. Pt also reports yeast infection at this time.

## 2020-07-27 ENCOUNTER — APPOINTMENT (OUTPATIENT)
Dept: INTERVENTIONAL RADIOLOGY/VASCULAR | Age: 50
End: 2020-07-27
Payer: MEDICAID

## 2020-07-27 LAB
ANION GAP SERPL CALCULATED.3IONS-SCNC: 10 MEQ/L (ref 8–16)
ANION GAP SERPL CALCULATED.3IONS-SCNC: 12 MEQ/L (ref 8–16)
AVERAGE GLUCOSE: 369 MG/DL (ref 70–126)
BASOPHILS # BLD: 0.5 %
BASOPHILS ABSOLUTE: 0 THOU/MM3 (ref 0–0.1)
BUN BLDV-MCNC: 10 MG/DL (ref 7–22)
BUN BLDV-MCNC: 11 MG/DL (ref 7–22)
CALCIUM SERPL-MCNC: 8.4 MG/DL (ref 8.5–10.5)
CALCIUM SERPL-MCNC: 8.4 MG/DL (ref 8.5–10.5)
CHLORIDE BLD-SCNC: 100 MEQ/L (ref 98–111)
CHLORIDE BLD-SCNC: 99 MEQ/L (ref 98–111)
CO2: 24 MEQ/L (ref 23–33)
CO2: 24 MEQ/L (ref 23–33)
CREAT SERPL-MCNC: 0.6 MG/DL (ref 0.4–1.2)
CREAT SERPL-MCNC: 0.9 MG/DL (ref 0.4–1.2)
EKG ATRIAL RATE: 109 BPM
EKG P AXIS: 63 DEGREES
EKG P-R INTERVAL: 138 MS
EKG Q-T INTERVAL: 366 MS
EKG QRS DURATION: 112 MS
EKG QTC CALCULATION (BAZETT): 492 MS
EKG R AXIS: -53 DEGREES
EKG T AXIS: 95 DEGREES
EKG VENTRICULAR RATE: 109 BPM
EOSINOPHIL # BLD: 2.7 %
EOSINOPHILS ABSOLUTE: 0.2 THOU/MM3 (ref 0–0.4)
ERYTHROCYTE [DISTWIDTH] IN BLOOD BY AUTOMATED COUNT: 12.9 % (ref 11.5–14.5)
ERYTHROCYTE [DISTWIDTH] IN BLOOD BY AUTOMATED COUNT: 41.8 FL (ref 35–45)
GFR SERPL CREATININE-BSD FRML MDRD: 66 ML/MIN/1.73M2
GFR SERPL CREATININE-BSD FRML MDRD: > 90 ML/MIN/1.73M2
GLUCOSE BLD-MCNC: 127 MG/DL (ref 70–108)
GLUCOSE BLD-MCNC: 177 MG/DL (ref 70–108)
GLUCOSE BLD-MCNC: 182 MG/DL (ref 70–108)
GLUCOSE BLD-MCNC: 183 MG/DL (ref 70–108)
GLUCOSE BLD-MCNC: 200 MG/DL (ref 70–108)
GLUCOSE BLD-MCNC: 220 MG/DL (ref 70–108)
GLUCOSE BLD-MCNC: 240 MG/DL (ref 70–108)
HAV IGM SER IA-ACNC: NEGATIVE
HBA1C MFR BLD: 14.3 % (ref 4.4–6.4)
HCT VFR BLD CALC: 38.7 % (ref 37–47)
HEMOGLOBIN: 12.9 GM/DL (ref 12–16)
HEPATITIS B CORE IGM ANTIBODY: NEGATIVE
HEPATITIS B SURFACE ANTIGEN: NEGATIVE
HEPATITIS C ANTIBODY: ABNORMAL
IMMATURE GRANS (ABS): 0.01 THOU/MM3 (ref 0–0.07)
IMMATURE GRANULOCYTES: 0.1 %
LYMPHOCYTES # BLD: 34.2 %
LYMPHOCYTES ABSOLUTE: 2.7 THOU/MM3 (ref 1–4.8)
MAGNESIUM: 1.8 MG/DL (ref 1.6–2.4)
MCH RBC QN AUTO: 29.7 PG (ref 26–33)
MCHC RBC AUTO-ENTMCNC: 33.3 GM/DL (ref 32.2–35.5)
MCV RBC AUTO: 89.2 FL (ref 81–99)
MONOCYTES # BLD: 9 %
MONOCYTES ABSOLUTE: 0.7 THOU/MM3 (ref 0.4–1.3)
NUCLEATED RED BLOOD CELLS: 0 /100 WBC
PLATELET # BLD: 186 THOU/MM3 (ref 130–400)
PMV BLD AUTO: 9.9 FL (ref 9.4–12.4)
POTASSIUM SERPL-SCNC: 3.1 MEQ/L (ref 3.5–5.2)
POTASSIUM SERPL-SCNC: 3.2 MEQ/L (ref 3.5–5.2)
POTASSIUM SERPL-SCNC: 3.8 MEQ/L (ref 3.5–5.2)
RBC # BLD: 4.34 MILL/MM3 (ref 4.2–5.4)
SARS-COV-2, NAAT: NOT DETECTED
SEDIMENTATION RATE, ERYTHROCYTE: 8 MM/HR (ref 0–20)
SEG NEUTROPHILS: 53.5 %
SEGMENTED NEUTROPHILS ABSOLUTE COUNT: 4.2 THOU/MM3 (ref 1.8–7.7)
SODIUM BLD-SCNC: 134 MEQ/L (ref 135–145)
SODIUM BLD-SCNC: 135 MEQ/L (ref 135–145)
T4 FREE: 1.6 NG/DL (ref 0.93–1.76)
TROPONIN T: < 0.01 NG/ML
TROPONIN T: < 0.01 NG/ML
VANCOMYCIN RESISTANT ENTEROCOCCUS: NEGATIVE
WBC # BLD: 7.9 THOU/MM3 (ref 4.8–10.8)

## 2020-07-27 PROCEDURE — 6370000000 HC RX 637 (ALT 250 FOR IP): Performed by: INTERNAL MEDICINE

## 2020-07-27 PROCEDURE — 2580000003 HC RX 258: Performed by: INTERNAL MEDICINE

## 2020-07-27 PROCEDURE — 2060000000 HC ICU INTERMEDIATE R&B

## 2020-07-27 PROCEDURE — 96376 TX/PRO/DX INJ SAME DRUG ADON: CPT

## 2020-07-27 PROCEDURE — 36415 COLL VENOUS BLD VENIPUNCTURE: CPT

## 2020-07-27 PROCEDURE — 80048 BASIC METABOLIC PNL TOTAL CA: CPT

## 2020-07-27 PROCEDURE — 6360000002 HC RX W HCPCS: Performed by: PHYSICIAN ASSISTANT

## 2020-07-27 PROCEDURE — 84132 ASSAY OF SERUM POTASSIUM: CPT

## 2020-07-27 PROCEDURE — 94760 N-INVAS EAR/PLS OXIMETRY 1: CPT

## 2020-07-27 PROCEDURE — 84484 ASSAY OF TROPONIN QUANT: CPT

## 2020-07-27 PROCEDURE — 96372 THER/PROPH/DIAG INJ SC/IM: CPT

## 2020-07-27 PROCEDURE — 83036 HEMOGLOBIN GLYCOSYLATED A1C: CPT

## 2020-07-27 PROCEDURE — 2580000003 HC RX 258: Performed by: PHYSICIAN ASSISTANT

## 2020-07-27 PROCEDURE — G0378 HOSPITAL OBSERVATION PER HR: HCPCS

## 2020-07-27 PROCEDURE — 6370000000 HC RX 637 (ALT 250 FOR IP): Performed by: PHYSICIAN ASSISTANT

## 2020-07-27 PROCEDURE — 85025 COMPLETE CBC W/AUTO DIFF WBC: CPT

## 2020-07-27 PROCEDURE — 83735 ASSAY OF MAGNESIUM: CPT

## 2020-07-27 PROCEDURE — 99232 SBSQ HOSP IP/OBS MODERATE 35: CPT | Performed by: INTERNAL MEDICINE

## 2020-07-27 PROCEDURE — 93005 ELECTROCARDIOGRAM TRACING: CPT | Performed by: PHYSICIAN ASSISTANT

## 2020-07-27 PROCEDURE — 96375 TX/PRO/DX INJ NEW DRUG ADDON: CPT

## 2020-07-27 PROCEDURE — U0002 COVID-19 LAB TEST NON-CDC: HCPCS

## 2020-07-27 PROCEDURE — 90792 PSYCH DIAG EVAL W/MED SRVCS: CPT | Performed by: PSYCHIATRY & NEUROLOGY

## 2020-07-27 PROCEDURE — 96361 HYDRATE IV INFUSION ADD-ON: CPT

## 2020-07-27 PROCEDURE — 85651 RBC SED RATE NONAUTOMATED: CPT

## 2020-07-27 PROCEDURE — 87500 VANOMYCIN DNA AMP PROBE: CPT

## 2020-07-27 PROCEDURE — 93880 EXTRACRANIAL BILAT STUDY: CPT

## 2020-07-27 PROCEDURE — 82948 REAGENT STRIP/BLOOD GLUCOSE: CPT

## 2020-07-27 PROCEDURE — 99223 1ST HOSP IP/OBS HIGH 75: CPT | Performed by: INTERNAL MEDICINE

## 2020-07-27 PROCEDURE — 2500000003 HC RX 250 WO HCPCS: Performed by: INTERNAL MEDICINE

## 2020-07-27 PROCEDURE — 87081 CULTURE SCREEN ONLY: CPT

## 2020-07-27 RX ORDER — INSULIN GLARGINE 100 [IU]/ML
20 INJECTION, SOLUTION SUBCUTANEOUS ONCE
Status: DISCONTINUED | OUTPATIENT
Start: 2020-07-27 | End: 2020-07-27

## 2020-07-27 RX ORDER — NICOTINE POLACRILEX 4 MG
15 LOZENGE BUCCAL PRN
Status: DISCONTINUED | OUTPATIENT
Start: 2020-07-27 | End: 2020-08-04 | Stop reason: HOSPADM

## 2020-07-27 RX ORDER — ACETAMINOPHEN 325 MG/1
650 TABLET ORAL EVERY 4 HOURS PRN
Status: DISCONTINUED | OUTPATIENT
Start: 2020-07-27 | End: 2020-08-04 | Stop reason: HOSPADM

## 2020-07-27 RX ORDER — SODIUM CHLORIDE 9 MG/ML
INJECTION, SOLUTION INTRAVENOUS CONTINUOUS
Status: DISCONTINUED | OUTPATIENT
Start: 2020-07-27 | End: 2020-07-28

## 2020-07-27 RX ORDER — INSULIN GLARGINE 100 [IU]/ML
20 INJECTION, SOLUTION SUBCUTANEOUS 2 TIMES DAILY
Status: DISCONTINUED | OUTPATIENT
Start: 2020-07-27 | End: 2020-07-27

## 2020-07-27 RX ORDER — DEXTROSE MONOHYDRATE 25 G/50ML
12.5 INJECTION, SOLUTION INTRAVENOUS PRN
Status: DISCONTINUED | OUTPATIENT
Start: 2020-07-27 | End: 2020-08-04 | Stop reason: HOSPADM

## 2020-07-27 RX ORDER — LANOLIN ALCOHOL/MO/W.PET/CERES
10 CREAM (GRAM) TOPICAL NIGHTLY
Status: DISCONTINUED | OUTPATIENT
Start: 2020-07-27 | End: 2020-08-04 | Stop reason: HOSPADM

## 2020-07-27 RX ORDER — DIVALPROEX SODIUM 125 MG/1
125 CAPSULE, COATED PELLETS ORAL DAILY
Status: DISCONTINUED | OUTPATIENT
Start: 2020-07-27 | End: 2020-08-04 | Stop reason: HOSPADM

## 2020-07-27 RX ORDER — DIVALPROEX SODIUM 125 MG/1
125 CAPSULE, COATED PELLETS ORAL 2 TIMES DAILY WITH MEALS
Status: DISCONTINUED | OUTPATIENT
Start: 2020-07-27 | End: 2020-07-27

## 2020-07-27 RX ORDER — TRAMADOL HYDROCHLORIDE 50 MG/1
50 TABLET ORAL ONCE
Status: COMPLETED | OUTPATIENT
Start: 2020-07-27 | End: 2020-07-27

## 2020-07-27 RX ORDER — POTASSIUM CHLORIDE 20 MEQ/1
40 TABLET, EXTENDED RELEASE ORAL EVERY 4 HOURS
Status: DISCONTINUED | OUTPATIENT
Start: 2020-07-27 | End: 2020-07-27

## 2020-07-27 RX ORDER — DEXTROSE MONOHYDRATE 50 MG/ML
100 INJECTION, SOLUTION INTRAVENOUS PRN
Status: DISCONTINUED | OUTPATIENT
Start: 2020-07-27 | End: 2020-08-04 | Stop reason: HOSPADM

## 2020-07-27 RX ORDER — INSULIN GLARGINE 100 [IU]/ML
25 INJECTION, SOLUTION SUBCUTANEOUS 2 TIMES DAILY
Status: DISCONTINUED | OUTPATIENT
Start: 2020-07-27 | End: 2020-08-04 | Stop reason: HOSPADM

## 2020-07-27 RX ORDER — PHENOL 1.4 %
10 AEROSOL, SPRAY (ML) MUCOUS MEMBRANE NIGHTLY PRN
COMMUNITY

## 2020-07-27 RX ADMIN — INSULIN LISPRO 2 UNITS: 100 INJECTION, SOLUTION INTRAVENOUS; SUBCUTANEOUS at 02:48

## 2020-07-27 RX ADMIN — OLANZAPINE 5 MG: 5 TABLET, FILM COATED ORAL at 21:13

## 2020-07-27 RX ADMIN — Medication 10.5 MG: at 21:15

## 2020-07-27 RX ADMIN — POTASSIUM CHLORIDE 10 MEQ: 7.46 INJECTION, SOLUTION INTRAVENOUS at 06:18

## 2020-07-27 RX ADMIN — PANTOPRAZOLE SODIUM 40 MG: 40 TABLET, DELAYED RELEASE ORAL at 05:23

## 2020-07-27 RX ADMIN — GABAPENTIN 300 MG: 300 CAPSULE ORAL at 13:52

## 2020-07-27 RX ADMIN — INSULIN GLARGINE 20 UNITS: 100 INJECTION, SOLUTION SUBCUTANEOUS at 08:05

## 2020-07-27 RX ADMIN — DIVALPROEX SODIUM 125 MG: 125 CAPSULE ORAL at 17:16

## 2020-07-27 RX ADMIN — MICONAZOLE NITRATE: 20 POWDER TOPICAL at 18:08

## 2020-07-27 RX ADMIN — SODIUM CHLORIDE: 4.5 INJECTION, SOLUTION INTRAVENOUS at 11:04

## 2020-07-27 RX ADMIN — MIRTAZAPINE 15 MG: 15 TABLET, FILM COATED ORAL at 21:13

## 2020-07-27 RX ADMIN — ACETAMINOPHEN 650 MG: 325 TABLET ORAL at 17:17

## 2020-07-27 RX ADMIN — SODIUM CHLORIDE: 4.5 INJECTION, SOLUTION INTRAVENOUS at 01:02

## 2020-07-27 RX ADMIN — GABAPENTIN 300 MG: 300 CAPSULE ORAL at 21:13

## 2020-07-27 RX ADMIN — SODIUM CHLORIDE: 9 INJECTION, SOLUTION INTRAVENOUS at 18:49

## 2020-07-27 RX ADMIN — INSULIN GLARGINE 25 UNITS: 100 INJECTION, SOLUTION SUBCUTANEOUS at 21:14

## 2020-07-27 RX ADMIN — POTASSIUM CHLORIDE 10 MEQ: 7.46 INJECTION, SOLUTION INTRAVENOUS at 04:04

## 2020-07-27 RX ADMIN — SODIUM CHLORIDE: 4.5 INJECTION, SOLUTION INTRAVENOUS at 05:23

## 2020-07-27 RX ADMIN — HEPARIN SODIUM 5000 UNITS: 5000 INJECTION INTRAVENOUS; SUBCUTANEOUS at 21:14

## 2020-07-27 RX ADMIN — POTASSIUM CHLORIDE 10 MEQ: 7.46 INJECTION, SOLUTION INTRAVENOUS at 07:58

## 2020-07-27 RX ADMIN — TRAMADOL HYDROCHLORIDE 50 MG: 50 TABLET, FILM COATED ORAL at 11:14

## 2020-07-27 RX ADMIN — ASPIRIN 81 MG 81 MG: 81 TABLET ORAL at 08:06

## 2020-07-27 RX ADMIN — POTASSIUM CHLORIDE 10 MEQ: 7.46 INJECTION, SOLUTION INTRAVENOUS at 11:01

## 2020-07-27 RX ADMIN — HEPARIN SODIUM 5000 UNITS: 5000 INJECTION INTRAVENOUS; SUBCUTANEOUS at 13:44

## 2020-07-27 RX ADMIN — HEPARIN SODIUM 5000 UNITS: 5000 INJECTION INTRAVENOUS; SUBCUTANEOUS at 02:48

## 2020-07-27 RX ADMIN — INSULIN LISPRO 1 UNITS: 100 INJECTION, SOLUTION INTRAVENOUS; SUBCUTANEOUS at 21:14

## 2020-07-27 RX ADMIN — GABAPENTIN 300 MG: 300 CAPSULE ORAL at 08:05

## 2020-07-27 RX ADMIN — LEVOTHYROXINE SODIUM 50 MCG: 50 TABLET ORAL at 05:24

## 2020-07-27 ASSESSMENT — PAIN DESCRIPTION - ORIENTATION
ORIENTATION: LOWER
ORIENTATION: LOWER

## 2020-07-27 ASSESSMENT — PAIN DESCRIPTION - DESCRIPTORS
DESCRIPTORS: ACHING
DESCRIPTORS: ACHING

## 2020-07-27 ASSESSMENT — PAIN DESCRIPTION - FREQUENCY
FREQUENCY: CONTINUOUS
FREQUENCY: CONTINUOUS

## 2020-07-27 ASSESSMENT — PAIN - FUNCTIONAL ASSESSMENT
PAIN_FUNCTIONAL_ASSESSMENT: ACTIVITIES ARE NOT PREVENTED
PAIN_FUNCTIONAL_ASSESSMENT: PREVENTS OR INTERFERES SOME ACTIVE ACTIVITIES AND ADLS

## 2020-07-27 ASSESSMENT — PAIN SCALES - GENERAL
PAINLEVEL_OUTOF10: 3
PAINLEVEL_OUTOF10: 6
PAINLEVEL_OUTOF10: 7
PAINLEVEL_OUTOF10: 4
PAINLEVEL_OUTOF10: 4
PAINLEVEL_OUTOF10: 5

## 2020-07-27 ASSESSMENT — PAIN DESCRIPTION - ONSET
ONSET: ON-GOING
ONSET: ON-GOING

## 2020-07-27 ASSESSMENT — PAIN DESCRIPTION - LOCATION
LOCATION: BACK
LOCATION: BACK

## 2020-07-27 ASSESSMENT — PAIN DESCRIPTION - PROGRESSION
CLINICAL_PROGRESSION: NOT CHANGED
CLINICAL_PROGRESSION: GRADUALLY WORSENING

## 2020-07-27 ASSESSMENT — PAIN DESCRIPTION - PAIN TYPE
TYPE: ACUTE PAIN
TYPE: ACUTE PAIN

## 2020-07-27 ASSESSMENT — PATIENT HEALTH QUESTIONNAIRE - PHQ9: SUM OF ALL RESPONSES TO PHQ QUESTIONS 1-9: 2

## 2020-07-27 NOTE — PLAN OF CARE
Pt admitted to  4K25. Complaints: hyperglcemia   IV: .9 infusing into the right antecubital at a rate of 25   mls/ hour with [id number] in the bag still. IV site free of s/s of infection or infiltration. Vital signs obtained. Assessment and data collection initiated. Two nurse skin assessment performed by Eyad MOLINA and Souleymane Patel. Oriented to room. Policies and procedures for 4 explained. All questions answered with no further questions at this time. Fall prevention and safety brochure discussed with patient. Bed alarm on. Call light in reach. Would you like your Primary Care Physician notified? No   The best day to schedule a follow up Dr appointment is: Thursday afternoons.

## 2020-07-27 NOTE — H&P
[facility]ist History & Physical   7/26/2020  7:04 PM   Assessment and Plan:        1. Acute Recurrent Syncopal episodes: likely drug induced given onset following recent methamphetamine relapse, will complete cardiac work up with concerning hx of preceding SOB/CP, and ? Contributing orthostatic hypotension. a. Drug use: Recent Meth use on 2/17/20 and 2/25/20  b. Cardiac/ arrhythmias: Telemetry. See additional cardiac work up below  c. Orthostatic: likely volume depleted with osmotic diuresis with hyperglycemia and supported by SG >1.030. When RN tried to complete orthostatics on arrival to the floor, patient's BP was unable to be recorded by the automatic machine, patient became SOB with hypoxia, and very pale and had to sit down. 2L IVF and maintenance IVF will repeat orthostatics in the morning.   d. Neuro:  Less likely seizures/CVA. Consider EEG/CT head if other work up neg   e. CT head showed no acute findings. f. Unlikely PE with neg CTA chest.   2. Chest Pain (resolved PTA) / CAD s/p CABG x1 2012: r/o ACS  a. Denies current CP. Only occur just prior to Syncopal episodes. Bilateral chest pressure. b. Cath (2019) showed native nonobstructive (50% to mid LAD) CAD and patent vein graft to PDA  c. Troponin neg x1. EKG shows  Sinus tachycardia with partial LBBB (old)  d. Trend troponin x2, consult cardiology. Telemetry. Resume ASA. 3. IDDM type II with hyperglycemia with ? HHS / Peripheral Neuropathy: likely 2/2 noncompliance. a. 737 BG, neg Beta- hydroxybutyrate, neg urine ketones, AG 17, Serum Osm 304.   b. Received 1L NS and 10 regular insulin in the ED (repeat )  c. Additional 1L NS, resume home lantus 20 units BID, medium dose SSI (goal -180), ACHS accuchek, diabetic diet, hypoglycemia order set. Pend HgbA1c. Unclear why not on metformin  4. Mild Hypertonic Hyponatremia Na 130 (corrected Na 140): HHS/fluid management as above.    5. ChronicTransaminitis (documented since 08/2019) / Hx always when she is standing, but denies occurring right after standing from sitting. She reports regular insulin use at home since her last admission (prior to that she had lost her insulin), she admits to forgetting to take her synthroid. She states she relapsed due to depression with not being able to see her grandchildren, but denies SI/HI. 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 She has chronic Hep C that she has never been treated for due to intermitting marijuana use and not able to have a clean urine tox for 6 months. She denies denies fever, chills, LE edema, orthopnea    In the ED, vitals show: HR shows 110, /82, RR 18, 94% on RA and afebrile. Labs show: Na 130 (corrected Na 140), Chloride 90, AG 17, Cr 0.9, BUN 14, , Ca 9.2, troponin neg, Alk Phos 223, ALT 71, AST 73, TSH 4.46, Beta-hydroxybutyrate 1.29, EtOH neg, CBC wnl, UA shows >1000 glucosuria, SG > 1.030. EKG shows Sinus tachycardia with partial LBBB. VBG shows: pH 7.33, pCO2 40.8, pO2 43, bicarb 24.6. CT head showed no acute findings. CTA shows no acute PE, no acute cardiopulmonary disease, sub centimeter gallstone. ROS: A 14 point ROS was performed and was negative other than the pertinent positives noted in the HPI  PMH:  Per HPI  SHX:   Denies all tobacco use and EtOH use. Prior heroin, methamphetamine, cocaine, marijuana use.    FHX: Mother: CKD    Allergies: Merperidine (paralysis), Nitro (hypotension), Pregabalin (N/V, Anxiety), Promethazine (anxiety)  Medications:    Current Facility-Administered Medications   Medication Dose Route Frequency Provider Last Rate Last Dose    0.9 % sodium chloride bolus  1,000 mL Intravenous Once [facility], [address]         Current Outpatient Medications   Medication Sig Dispense Refill    metoprolol tartrate (LOPRESSOR) 25 MG tablet Take 1 tablet by mouth 2 times daily 60 tablet 3    Fchypg-XgVwa-WmFjjb-FA-Omega (MULTIVITAMIN/MINERALS PO) Take 1 tablet by mouth daily      acetaminophen (TYLENOL) 325 MG tablet Take 650 mg by mouth every 4 hours as needed      aspirin 81 MG chewable tablet Take 81 mg by mouth daily      atomoxetine (STRATTERA) 25 MG capsule Take 25 mg by mouth daily      atorvastatin (LIPITOR) 20 MG tablet Take 1 tablet by mouth nightly      citalopram (CELEXA) 40 MG tablet Take 40 mg by mouth nightly      divalproex (DEPAKOTE) 125 MG DR tablet Take 1 tablet by mouth 2 times daily (with meals)  0    TRULICITY 6.62 ED/2.0TL SOPN Inject 1 pen into the skin every 7 days On fridays  3    gabapentin (NEURONTIN) 300 MG capsule Take 300 mg by mouth 3 times daily.       insulin aspart (NOVOLOG) 100 UNIT/ML injection vial Inject 15 Units into the skin 3 times daily (before meals)      insulin glargine (BASAGLAR KWIKPEN) 100 UNIT/ML injection pen Inject 20 Units into the skin 2 times daily      lansoprazole (PREVACID) 15 MG delayed release capsule Take 15 mg by mouth every morning (before breakfast)      levothyroxine (SYNTHROID) 50 MCG tablet Take 50 mcg by mouth every morning (before breakfast)      losartan (COZAAR) 25 MG tablet Take 25 mg by mouth every morning (before breakfast)      meclizine (ANTIVERT) 12.5 MG tablet Take 12.5 mg by mouth every morning (before breakfast)      Melatonin 3-10 MG TABS Take 6 mg by mouth nightly      minocycline (MINOCIN;DYNACIN) 100 MG capsule Take 1 capsule by mouth 2 times daily  2    mirtazapine (REMERON) 15 MG tablet Take 1 tablet by mouth nightly  1    OLANZapine (ZYPREXA) 5 MG tablet Take 1 tablet by mouth nightly  0    ondansetron (ZOFRAN-ODT) 4 MG disintegrating tablet Take 4 mg by mouth every 6 hours as needed      torsemide (DEMADEX) 20 MG tablet Take 1 tablet by mouth daily  1    venlafaxine (EFFEXOR XR) 75 MG extended release capsule Take 1 capsule by mouth daily  0         Vital Signs:   /83   Pulse 98   Temp 97.9 °F (36.6 °C) (Oral)   Resp 20   Ht 5' 5\" (1.651 m)   Wt 216 lb (98 kg)   SpO2 95%   BMI 35.94 kg/m²    No intake or output data in the 24 hours ending 07/26/20 6010     General:  White female well groomed, well-nourished, well-developed who appears stated age, in no acute distress lying in bed. Head: Normocephalic and atraumatic. EENT: No exophthalmos noted. No scleral or conjunctiva icterus, injection or pallor noted. Neck: Supple. Trachea midline. No thyromegaly. Thorax/Lungs: Thorax is symmetrical with good expansion. Breath sounds CTA and equal b/l without rales, wheezing, or rhonchi. No retractions or use of abdominal muscles. Cardiac: S1, S2, RRR without murmur, rub, or gallop. No JVD  Abdomen: Abdomen soft. , nontender to palpation, without guarding or rigidity. Normoactive BS. No CVA tenderness  Peripheral Vasculature: Extremities warm, dry without edema, no varicosities or stasis changes, DP  pulses 2+ b/l. Brisk capillary refill. Skin:  Skin warm and dry. No lesions, rash. Psych:  Alert and oriented x3. Affect appropriate  Lymph:  No supraclavicular or cervical adenopathy. Neurologic: No focal deficits. No Seizures.      Data:   Labs:   Results for orders placed or performed during the hospital encounter of 07/26/20   CBC auto differential   Result Value Ref Range    WBC 9.7 4.8 - 10.8 thou/mm3    RBC 4.72 4.20 - 5.40 mill/mm3    Hemoglobin 14.2 12.0 - 16.0 gm/dl    Hematocrit 42.6 37.0 - 47.0 %    MCV 90.3 81.0 - 99.0 fL    MCH 30.1 26.0 - 33.0 pg    MCHC 33.3 32.2 - 35.5 gm/dl    RDW-CV 12.8 11.5 - 14.5 %    RDW-SD 41.5 35.0 - 45.0 fL    Platelets 453 259 - 183 thou/mm3    MPV 10.6 9.4 - 12.4 fL    Seg Neutrophils 61.9 %    Lymphocytes 28.3 %    Monocytes 7.9 %    Eosinophils 1.3 %    Basophils 0.4 %    Immature Granulocytes 0.2 %    Segs Absolute 6.0 1.8 - 7.7 thou/mm3    Lymphocytes Absolute 2.7 1.0 - 4.8 thou/mm3    Monocytes Absolute 0.8 0.4 - 1.3 thou/mm3    Eosinophils Absolute 0.1 0.0 - 0.4 thou/mm3    Basophils Absolute 0.0 0.0 - 0.1 thou/mm3 Immature Grans (Abs) 0.02 0.00 - 0.07 thou/mm3    nRBC 0 /100 wbc   Comprehensive Metabolic Panel w/ Reflex to MG   Result Value Ref Range    Glucose 737 (HH) 70 - 108 mg/dL    CREATININE 0.9 0.4 - 1.2 mg/dL    BUN 14 7 - 22 mg/dL    Sodium 130 (L) 135 - 145 meq/L    Potassium reflex Magnesium 3.9 3.5 - 5.2 meq/L    Chloride 90 (L) 98 - 111 meq/L    CO2 23 23 - 33 meq/L    Calcium 9.2 8.5 - 10.5 mg/dL    AST 75 (H) 5 - 40 U/L    Alkaline Phosphatase 223 (H) 38 - 126 U/L    Total Protein 6.8 6.1 - 8.0 g/dL    Alb 3.9 3.5 - 5.1 g/dL    Total Bilirubin 0.7 0.3 - 1.2 mg/dL    ALT 71 (H) 11 - 66 U/L   Ethanol   Result Value Ref Range    ETHYL ALCOHOL, SERUM < 0.01 0.00 %   Troponin   Result Value Ref Range    Troponin T < 0.010 ng/ml   Urine reflex to culture    Specimen: Urine, straight catheter   Result Value Ref Range    Glucose, Ur >= 1000 (A) NEGATIVE mg/dl    Bilirubin Urine NEGATIVE NEGATIVE    Ketones, Urine NEGATIVE NEGATIVE    Specific Gravity, Urine > 1.030 (A) 1.002 - 1.03    Blood, Urine NEGATIVE NEGATIVE    pH, UA 5.5 5.0 - 9.0    Protein, UA NEGATIVE NEGATIVE    Urobilinogen, Urine 0.2 0.0 - 1.0 eu/dl    Nitrite, Urine NEGATIVE NEGATIVE    Leukocyte Esterase, Urine NEGATIVE NEGATIVE    Color, UA YELLOW STRAW-YELL    Character, Urine CLEAR CLEAR-SL C   Lipase   Result Value Ref Range    Lipase 23.6 5.6 - 51.3 U/L   TSH with Reflex   Result Value Ref Range    TSH 4.460 (H) 0.400 - 4.20 uIU/mL   Beta-Hydroxybutyrate   Result Value Ref Range    Beta-Hydroxybutyrate 1.29 0.20 - 2.81 mg/dl   Blood gas, venous   Result Value Ref Range    PH MIXED 7.39 7.31 - 7.41    PCO2, MIXED VENOUS 41 41 - 51 mmhg    PO2, Mixed 42 (H) 25 - 40 mmhg    HCO3, Mixed 25 23 - 28 mmol/l    Base Exc, Mixed -0.4 -2.0 - 3.0 mmol/l    O2 Sat, Mixed 77 %    COLLECTED BY: [id number]     DEVICE Room Air    Anion Gap   Result Value Ref Range    Anion Gap 17.0 (H) 8.0 - 16.0 meq/L   Osmolality   Result Value Ref Range    Osmolality Calc 296.7 275.0 - 300 mOsmol/kg   Glomerular Filtration Rate, Estimated   Result Value Ref Range    Est, Glom Filt Rate 66 (A) ml/min/1.73m2   EKG Fall   Result Value Ref Range    Ventricular Rate 109 BPM    Atrial Rate 109 BPM    P-R Interval 138 ms    QRS Duration 112 ms    Q-T Interval 366 ms    QTc Calculation (Bazett) 492 ms    P Axis 63 degrees    R Axis -53 degrees    T Axis 95 degrees       EKG / Radiology:     EKG:  Reviewed by me: Sinus tachycardia with partial LBBB   Ct Head Wo Contrast    Result Date: 7/26/2020  PROCEDURE: CT HEAD WO CONTRAST CLINICAL INFORMATION: fall, struck head. COMPARISON: None TECHNIQUE: Noncontrast 5 mm axial images were obtained through the brain. Sagittal and coronal reconstructions were obtained and reviewed. All CT scans at this facility use dose modulation, iterative reconstruction, and/or weight-based dosing when appropriate to reduce radiation dose to as low as reasonably achievable. FINDINGS: Brain: There is no acute ischemic infarct, hemorrhage, midline shift, mass, or mass effect. There is no focal abnormality of brain parenchymal attenuation. Ventricles/basal cisterns: The ventricles and cisterns are of appropriate size and configuration for the patient's age. No evidence of obstructive hydrocephalus. Skull base/calvarium/osseous structures: Unremarkable Soft tissues: Unremarkable Intraorbital contents: Unremarkable Sinuses: Unremarkable; the imaged sinuses are clear without evidence of mucosal thickening or fluid levels. Mastoids: Unremarkable; the mastoid air cells are clear. No acute ischemic infarct, hemorrhage, or mass effect. **This report has been created using voice recognition software. It may contain minor errors which are inherent in voice recognition technology. ** Final report electronically signed by Dr. Heraclio Mcpherson on 7/26/2020 10:01 PM    Cta Chest W Wo Contrast    Result Date: 7/26/2020  PROCEDURE: CTA CHEST W WO CONTRAST CLINICAL INFORMATION: LOC, dizziness. . COMPARISON: Chest x-ray earlier today TECHNIQUE: 3 mm thick by 1.5 mm interval axial images were obtained through the chest after the administration of IV contrast.  A non-contrast localizer was obtained. Sagittal and coronal MIP 3D reconstructions were performed on the scanner. 80 mL Isovue-370 were administered intravenously. All CT scans at this facility use dose modulation, iterative reconstruction, and/or weight-based dosing when appropriate to reduce radiation dose to as low as reasonably achievable. FINDINGS: Lungs: There is no pneumonia or mass. There is very mild bilateral upper and lower lobe dependent atelectasis. The trachea and central bronchi are unremarkable. Pleura: There is no pleural effusion. There is no pneumothorax. Heart: Heart size is normal. The patient is status post sternotomy for CABG surgery. There is no pericardial effusion. Pulmonary vasculature: There is adequate opacification of the pulmonary arteries. There is no pulmonary embolism. Afua and mediastinum: There is no hilar or mediastinal mass or adenopathy. Thoracic aorta/vascular: There is no thoracic aortic aneurysm or dissection. The imaged brachiocephalic arteries are unremarkable. Imaged upper abdomen: There is a subcentimeter calcified gallstone in the gallbladder. There is no biliary dilatation. There are a few hepatic calcified granulomas. There is a 2 to 3 mm nonobstructing upper/mid right renal calculus. Musculoskeletal system: Unremarkable Chest/body wall soft tissues: Unremarkable Thyroid: Unremarkable     No acute pulmonary embolism. No acute cardiopulmonary disease. Status post CABG surgery. Subcentimeter calcified gallstone in the gallbladder. 2 to 3 mm nonobstructing right renal calculus. **This report has been created using voice recognition software. It may contain minor errors which are inherent in voice recognition technology. ** Final report electronically signed by Dr. Jerel Bateman on 7/26/2020 10:07 PM    Xr Chest Portable    Result Date: 7/26/2020  PROCEDURE: XR CHEST PORTABLE CLINICAL INFORMATION: 77-year-old female with dizziness. COMPARISON: Chest x-ray 6/23/2020. TECHNIQUE: AP upright view of the chest was obtained. FINDINGS: Metallic wire sutures are in the sternum There are low lung volumes which accentuate the pulmonary vasculature. The cardiac silhouette is normal in size. There is no significant pleural effusion or pneumothorax. Visualized portions of the upper abdomen are within normal limits. The osseous structures are intact. No acute fractures or suspicious osseous lesions. There is no acute intrathoracic process. **This report has been created using voice recognition software. It may contain minor errors which are inherent in voice recognition technology. ** Final report electronically signed by Dr Radha Maurice on 7/26/2020 8:14 PM      Case and Plan discussed with Johanna Salcedo MD  Electronically signed by Hardeep Pat on 7/26/2020 at 10:59 PM

## 2020-07-27 NOTE — CARE COORDINATION
20, 7:24 AM EDT  DISCHARGE PLANNING EVALUATION:    Curtis Gonsalezrp       Admitted from: ER 2020/ 1405 [facility] day: 1   [facility] Reason for admit: Hyperglycemia [R73.9] Status: Inpt  Admit order signed?: yes  PMH:  has a past medical history of CHF (congestive heart failure) ([facility] [address] Diabetes mellitus ([facility] [address] Hepatitis C, Herpes, Kidney failure, Kidney stone, Liver failure ([facility] [address] and Vertigo. Procedure: No  Pertinent abnormal Imagin20 CTA Chest  No acute pulmonary embolism. No acute cardiopulmonary disease. Status post CABG surgery. Subcentimeter calcified gallstone in the gallbladder. 2 to 3 mm nonobstructing right renal calculus. Medications:  Scheduled Meds:   insulin glargine  20 Units Subcutaneous BID    insulin lispro  0-12 Units Subcutaneous TID WC    insulin lispro  0-6 Units Subcutaneous Nightly    potassium chloride  40 mEq Oral Q4H    aspirin  81 mg Oral Daily    [Held by provider] divalproex  125 mg Oral BID WC    gabapentin  300 mg Oral TID    pantoprazole  40 mg Oral QAM AC    levothyroxine  50 mcg Oral QAM AC    [Held by provider] losartan  25 mg Oral QAM AC    melatonin  6 mg Oral Nightly    [Held by provider] metoprolol tartrate  25 mg Oral BID    [Held by provider] mirtazapine  15 mg Oral Nightly    OLANZapine  5 mg Oral Nightly    torsemide  20 mg Oral Daily    [Held by provider] venlafaxine  75 mg Oral Daily    heparin (porcine)  5,000 Units Subcutaneous 3 times per day     Continuous Infusions:   dextrose        Pertinent Info/Orders/Treatment Plan: To ER with dizziness and falls. Falls due to relapse on Meth per pt after 1.5 yr sober. Wearing walking boot on left leg to do a previous injury. Also states has yeast infection at present. Consults to Addiction Services, Psychiatry, CHF Nurse. Orthostatic VS. Monitor and treat blood glucose. IVF. Diet: DIET CARB CONTROL;   Smoking status:  reports that she has been smoking.  She has never used smokeless tobacco.   PCP: Berenice Sheets APRN - CNP  Readmission 30 days or less: No  Readmission Risk Score: 24%    Discharge Planning Evaluation  Current Residence:  Private Residence  Living Arrangements:  Friends   Support Systems:  Children, Family Members, Friends/Neighbors  Current Services PTA:     Potential Assistance Needed:  Outpatient PT/OT  Potential Assistance Purchasing Medications:  No  Does patient want to participate in local refill/ meds to beds program?  Yes  Type of Home Care Services:  None  Patient expects to be discharged to:  Home with friend  Expected Discharge date:  07/31/20  Follow Up Appointment: Best Day/ Time: Thursday PM    Patient Goals/Plan/Treatment Preferences: Met with pt today as she was sitting on bed awaiting to be taken to bathroom. She is from home where she has a room mate whom she states works and sleeps. She has no current home services. She does have a cane and a walker. She states she drives and has a PCP. She denies issues getting medications and basic needs. Transportation/Food Security/Housekeeping Addressed:  No issues identified.     Evaluation: no
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
## 2020-07-27 NOTE — ED NOTES
Pt straight cathed at this time. Cream applied to ivana area due to excoriation.       Yonatan Solorio RN  07/26/20 2332

## 2020-07-27 NOTE — CONSULTS
Brief Intervention and Referral to Treatment Summary    Patient was provided PHQ-9, AUDIT and DAST Screening:      PHQ-9 Score: 2  AUDIT Score:  0  DAST Score:  5    Patients substance use is considered     Low Risk    Patients depression is considered:     Minimal    Brief Education Was Provided    Patient was receptive to resources provided by the clinician      Brief Intervention Is Provided (Only for AUDIT or DAST)     Patient reports readiness to decrease and/or stop use and a plan was discussed. Patient is motivated to discontinue substance use by using a journal and other coping techniques. Recommendations/Referrals for Brief and/or Specialized Treatment Provided to Patient     Patient reports already seeking treatment via [facility].
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
## 2020-07-27 NOTE — CONSULTS
Syncope  Probable  Orthostatic Hypotension  CAD s/p stent and later CABG to RCA x1  Drug abuse    Plan    Limited echo  lexiscnuc  Hydration  Replace K    [id number]    Evelyn Shahid MD           CORONARY ANATOMY:  1. Right coronary artery is the dominant vessel. Proximally, is patent  with mild luminal irregularities. Mid and distal segments are severely  diseased with a distal segment with a stent which shows greater than 70%  in-stent restenosis. There is a retrograde filling of the graft to the  PDA that is noted. 2.  Left main is patent, and it gives rise to LAD and left circumflex  arteries. 3.  The left circumflex artery is patent in the proximal portion. Mid  and distal portions have mild luminal irregularities. 4. LAD is patent in the proximal portion. Mid to distal segment of the  LAD has mild luminal irregularities. With a mid segment, there is a  focal 50% stenosis of the LAD.     GRAFT ANGIOGRAPHY:  Vein graft to the PDA is patent with mild luminal  irregularities.     LVEDP was measured to be 24 mmHg, there is no obvious gradient across  the aortic valve, and LV systolic function was estimated at 60%.    The patient tolerated the procedure well without any significant issues  or complications. Hemostasis of the right common femoral artery was  achieved with manual pressure.     She was transferred back to her room in stable condition. SUMMARY:  Native vessel coronary artery disease with patent vein graft  to the RPDA.     RECOMMENDATIONS:  1. Aggressive risk factor modification. 2.  Lipid-lowering therapy. 3.  Optimize medical management. 4.  Monitor groin access site.   5.  Adjust the diurese as LVEDP is elevated.     All procedure details and management plans were discussed in detail with  the patient, and she was in agreement with the plan.  Bob Buck MD     D: 12/19/2019 10:18: Home

## 2020-07-27 NOTE — PROGRESS NOTES
Pharmacy Medication History Note      List of current medications patient is taking is complete. Source of information: Patient and [employer] fill history with [facility] in [facility]. 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 She was very honest about inconsistent adherence. Changes made to medication list:  Medications removed (include reason, ex. therapy complete or physician discontinued):  Removed Novolog (insulin aspart) - patient's therapy changed to Admelog as noted below  Confirmed removal of citalopram - patient stated her therapy changed to venlafaxine 75 mg daily. Fill history with [facility] confirms citalopram hasn't been filled since 2019. Medications added/doses adjusted: Added Admelog 15 units three times daily - patient stated \"3 times daily per sliding scale\", fill history with [facility] only states three times daily (not depending on sliding scale). Adjusted Basaglar (insulin glargine) to 25 units twice daily - patient indicates dose increase following a hospital visit sometime since April. Fill history does not reflect this dosing. Adjusted melatonin to 10 mg nightly - patient indicated  Adjusted divalproex 125 mg to daily instead of twice daily - patient states she only takes daily. This medication was ordered on admission for twice daily dosing. Other notes (ex. Recent course of antibiotics, Coumadin dosing):  Some medications that she admitted to taking infrequently and on an as needed basis: acetaminophen 325 mg, lansoprazole 15 mg, zofran 4 mg. Patient stated three days ago she realized her euthyrox 50 mcg was not in her pill box, so she just recently added that back in. She said she has received three consecutive doses in the last three days, last dose being yesterday morning. She is unsure how long she was without it in her pill box.   Metoprolol tartrate 25 mg twice daily did not sound familiar to her, but fill history shows it's been filled with all of her other medications monthly. Lastly, she confirmed trulicity is a weekly dose on fridays. Her last dose was this past Friday, July 24th. Denies use of other OTC or herbal medications.       Allergies reviewed      Electronically signed by José Miguel Mcneil on 7/27/2020 at 9:59 AM

## 2020-07-27 NOTE — PROGRESS NOTES
Hospitalist Progress Note    Patient:  Sheela Lin  YOB: 1970  MRN: [id number]   PCP: WAGNER Momin CNP       Acct: [de-identified]  Unit[facility]Bed: Critical access hospital[facility]-    Date of Admission: 7/26/2020      ASSESSMENT/ PLAN     1. Recurrent syncopal episodes associated with CP: unclear etiology. CT head unremarkable. CTA of chest unremarkable for PE. Carotid US CTA of neck. Last echo showed an EF of 50-55%Echocardiogram Continue to monitor. Question of orthostasis? Per Cardiology stress test planned in am. Related to chronic hepatitis C? Torsemide currently held due to question of orthostasis. Repeat orthostatics in am. Metoprolol held due to stress test planned in am  2. Hyperglycemia: Due to non-adherence to mediations. Treated with IV insulin x 1 and then subcutaneous insulin  3. Relapsed methamphetamine abuse: Psychiatry consulted. Addiction services to be consulted   4. Hypokalemia: Replace prn  5. Abnormal thyroid function tests: Hep C positive  6. Chronic hepatitis C: Patient states that she will be following up as an outpatient for therapy  7. History of polysubstance abuse  8. Hypothyroidism: Levothyroxine  9. GERD  10. Chronic depression: Continue home meds  11. Obesity      Anticipated Discharge in : 2 days    Code Status: Full Code    Electronically signed by Mikaela Aldana MD on 7/27/2020 at 4:47 PM      Chief Complaint     Syncopal episodes    SUBJECTIVE     As derived from the H&P:  The patient is a 52 y.o. female w/ \"PMH of CAD s/p CABG x1 (2012), IVDU, methamphetamine use, Chronic Hep C and hypothyroidism who presented to [facility] ED c/o recurrent syncopal episodes that started 7/17/20 after relapsing and using methamphetamines and again on 7/25/20. She states that she has been having 4-5 syncopal episodes per day intermittently that are preceded by bilateral chest pressure and severe SOB and then she wakes up on the floor.  Her boyfriend has witnessed on of her episodes and states she 5,000 Units Subcutaneous 3 times per day     PRN Meds: glucose, dextrose, glucagon (rDNA), dextrose, acetaminophen, insulin regular, dextrose, potassium chloride, magnesium sulfate, sodium phosphate IVPB **OR** sodium phosphate IVPB **OR** sodium phosphate IVPB    Ins and outs:      Intake/Output Summary (Last 24 hours) at 7/27/2020 1647  Last data filed at 7/27/2020 1524  Gross per 24 hour   Intake 2936.91 ml   Output 0 ml   Net 2936.91 ml       Physical Examination     /71   Pulse 80   Temp 97.5 °F (36.4 °C) (Oral)   Resp 12   Ht 5' 5\" (1.651 m)   Wt 223 lb 8 oz (101.4 kg)   SpO2 99%   BMI 37.19 kg/m²     General appearance: No apparent distress. Obesity  HEENT: Extraocular motion intact. Trachea midline. Neck: Supple. Respiratory:  CTA bilaterally without rales/wheezes/rhonchi. Cardiovascular: RRR with normal S1/S2 without murmurs, rubs or gallops. Abdomen: Soft, non-tender, non-distended with normal bowel sounds. Musculoskeletal: Patient is moving extremities x 4 spontaneously  Neurologic: Grossly non focal. CN: II-XII intact  Psychiatric: Alert and oriented  Vascular: Dorsalis pedis palpable bilaterally. Radial pulses palpable bilaterally. No peripheral edema  Skin:  No visible rashes or lesions. Labs     Recent Labs     07/26/20 1915 07/27/20 0341   WBC 9.7 7.9   HGB 14.2 12.9   HCT 42.6 38.7    186     Recent Labs     07/26/20 1915 07/27/20  0126 07/27/20  0341 07/27/20  1413   * 135 134*  --    K 3.9 3.2* 3.1* 3.8   CL 90* 99 100  --    CO2 23 24 24  --    BUN 14 11 10  --    CREATININE 0.9 0.9 0.6  --    CALCIUM 9.2 8.4* 8.4*  --      Recent Labs     07/26/20 1915   AST 75*   ALT 71*   BILITOT 0.7   ALKPHOS 223*     No results for input(s): INR in the last 72 hours. No results for input(s): Tiney Farmer in the last 72 hours.     Urinalysis:      Lab Results   Component Value Date    NITRU NEGATIVE 07/26/2020    BLOODU NEGATIVE 07/26/2020    GLUCOSEU >= 1000 07/26/2020       Diagnostic imaging/procedures       Ct Head Wo Contrast    Result Date: 7/26/2020  PROCEDURE: CT HEAD WO CONTRAST CLINICAL INFORMATION: fall, struck head. COMPARISON: None TECHNIQUE: Noncontrast 5 mm axial images were obtained through the brain. Sagittal and coronal reconstructions were obtained and reviewed. All CT scans at this facility use dose modulation, iterative reconstruction, and/or weight-based dosing when appropriate to reduce radiation dose to as low as reasonably achievable. FINDINGS: Brain: There is no acute ischemic infarct, hemorrhage, midline shift, mass, or mass effect. There is no focal abnormality of brain parenchymal attenuation. Ventricles/basal cisterns: The ventricles and cisterns are of appropriate size and configuration for the patient's age. No evidence of obstructive hydrocephalus. Skull base/calvarium/osseous structures: Unremarkable Soft tissues: Unremarkable Intraorbital contents: Unremarkable Sinuses: Unremarkable; the imaged sinuses are clear without evidence of mucosal thickening or fluid levels. Mastoids: Unremarkable; the mastoid air cells are clear. No acute ischemic infarct, hemorrhage, or mass effect. **This report has been created using voice recognition software. It may contain minor errors which are inherent in voice recognition technology. ** Final report electronically signed by Dr. Sal Dueñas on 7/26/2020 10:01 PM    Cta Chest W Wo Contrast    Result Date: 7/26/2020  PROCEDURE: CTA CHEST W WO CONTRAST CLINICAL INFORMATION: LOC, dizziness. . COMPARISON: Chest x-ray earlier today TECHNIQUE: 3 mm thick by 1.5 mm interval axial images were obtained through the chest after the administration of IV contrast.  A non-contrast localizer was obtained. Sagittal and coronal MIP 3D reconstructions were performed on the scanner. 80 mL Isovue-370 were administered intravenously.  All CT scans at this facility use dose modulation, iterative reconstruction, and/or weight-based dosing when appropriate to reduce radiation dose to as low as reasonably achievable. FINDINGS: Lungs: There is no pneumonia or mass. There is very mild bilateral upper and lower lobe dependent atelectasis. The trachea and central bronchi are unremarkable. Pleura: There is no pleural effusion. There is no pneumothorax. Heart: Heart size is normal. The patient is status post sternotomy for CABG surgery. There is no pericardial effusion. Pulmonary vasculature: There is adequate opacification of the pulmonary arteries. There is no pulmonary embolism. Afua and mediastinum: There is no hilar or mediastinal mass or adenopathy. Thoracic aorta/vascular: There is no thoracic aortic aneurysm or dissection. The imaged brachiocephalic arteries are unremarkable. Imaged upper abdomen: There is a subcentimeter calcified gallstone in the gallbladder. There is no biliary dilatation. There are a few hepatic calcified granulomas. There is a 2 to 3 mm nonobstructing upper/mid right renal calculus. Musculoskeletal system: Unremarkable Chest/body wall soft tissues: Unremarkable Thyroid: Unremarkable     No acute pulmonary embolism. No acute cardiopulmonary disease. Status post CABG surgery. Subcentimeter calcified gallstone in the gallbladder. 2 to 3 mm nonobstructing right renal calculus. **This report has been created using voice recognition software. It may contain minor errors which are inherent in voice recognition technology. ** Final report electronically signed by Dr. Mouna Jameson on 7/26/2020 10:07 PM    Xr Chest Portable    Result Date: 7/26/2020  PROCEDURE: XR CHEST PORTABLE CLINICAL INFORMATION: 80-year-old female with dizziness. COMPARISON: Chest x-ray 6/23/2020. TECHNIQUE: AP upright view of the chest was obtained. FINDINGS: Metallic wire sutures are in the sternum There are low lung volumes which accentuate the pulmonary vasculature. The cardiac silhouette is normal in size.  There is no significant pleural effusion or pneumothorax. Visualized portions of the upper abdomen are within normal limits. The osseous structures are intact. No acute fractures or suspicious osseous lesions. There is no acute intrathoracic process. **This report has been created using voice recognition software. It may contain minor errors which are inherent in voice recognition technology. ** Final report electronically signed by Dr Luis Joseph on 7/26/2020 8:14 PM      DVT prophylaxis: [] Lovenox                                 [x] SCDs                                 [] SQ Heparin                                 [] Encourage ambulation           [] Already on Anticoagulation     Disposition:    [x] Home       [] TCU       [] Rehab       [] Psych       [] SNF       [] [place]       [] Other-

## 2020-07-27 NOTE — CONSULTS
[address] [place] [address]                                  CONSULTATION    PATIENT NAME: Joan LUI                    :        1970  MED REC NO:   [id number]                           ROOM:       0025  ACCOUNT NO:   [de-identified]                           ADMIT DATE: 2020  PROVIDER:     Mt Miranda M.D.    [facility]:  2020    REASON FOR CONSULTATION:  Syncopal episode in the last one week. PRIMARY CARDIOLOGIST:  Used to be in [place] and now recently moving  here and has been seen by Dr. Kori Taylor, like seven months ago and missed an  appointment in the office once. HISTORY OF PRESENT ILLNESS:  This is a 15-year-old female patient with  past medical history of coronary artery disease, status post previous  stent and later subsequent coronary artery bypass single vessel in ;  IV drug use, methamphetamine and cocaine use; gastroesophageal reflux  disease; depression; hypertension; hyperlipidemia; chronic hep C;  hypothyroidism presents to the emergency room with two episodes of  syncope in the last one week. On 2020, she had syncopal episode  after vertigo and she recovered, seeking medical advice here. The day  before on 2020, the patient had felt dizzy, lightheaded, and then  she passed out. At this time, second episode, the patient was using  methamphetamine and she does not know exactly what happened in the  sequence of event. Initially on the first one, a week ago, she was  feeling dizzy. The patient stated she had two episodes of syncope in  the last two weeks.   She does not have syncope before that and today  after hydration overnight, she was feeling better and no more dizziness  and yesterday on arrival to the emergency room, systolic blood pressure  946 on supine and sitting it was 90 and standing, the patient could not  stand because of severe dizziness and has to assume sitting or supine  position, so definite orthostatic evaluation cannot be performed. However, there is a drop of 40 mmHg from just supine to sitting and so,  it looks like suggestive for orthostatic hypotension. Today, she was  able to get out of the bed and get to the recliner besides the chair and  she did not feel any dizziness or lightheadedness and did not have  difficulty to stand. The patient is trying to quit the use of drugs,  but she went back into it after quitting in between for a while. She  has some chest pressure. She has some short of breath on exertion and  also at times with that she has some chest pressure, that was happening  only yesterday after the methamphetamine use. The chest pressure and  short of breath on exertion was after the methamphetamine use and  otherwise, no chest pain before that and no short of breath before that. She has some psychiatric problem, depression and under treatment. REVIEW OF SYSTEMS:  Negative except the above-mentioned ones. PAST MEDICAL HISTORY:  Includes coronary artery disease, status post  single vessel bypass in , SVG to RCA apparently; diabetes;  hyponatremia; hep C positive; hypothyroidism; hypertension;  hyperlipidemia; depression; polysubstance abuse; history of IV drug use;  smoked methamphetamine; history of heroin, cocaine, and marijuana use;  and obesity. The patient has history of congestive heart failure as  well. PAST SURGICAL HISTORY:  Include the following:  Kidney stone surgery,   section, coronary artery bypass with valve repair. FAMILY HISTORY:  No family history of premature coronary artery disease. SOCIAL HISTORY:  She is a current smoker and no alcohol use, but she  uses multiple drugs, methamphetamine was a recent one. She has history  of use of IV drug heroin and history of cocaine use.     HOME MEDICATIONS:  Prior to admission include aspirin, Lipitor,  Depakote, Neurontin, Admelog, Prevacid, Synthroid, Cozaar, Antivert,  melatonin, Lopressor, minocycline, mirtazapine, Remeron, Zyprexa,  Zofran, Demadex, Effexor. PHYSICAL EXAMINATION:  GENERAL:  Looks sick, in no form of distress at this time. VITAL SIGNS:  As I stated blood pressure now 115/52, pulse rate 70,  respiratory rate 18, temperature 97.7. HEENT:  Pink conjunctivae. Anicteric sclerae. Pupils are equal and  reactive bilaterally to light. NECK:  No lymphadenopathy. No goiter. No JVD. CHEST:  Clear to auscultation and percussion. CARDIOVASCULAR:  Arteries are felt; carotid, femoral, pedal.  No bruits. S1, S2 well heard. No murmur or gallop rhythm. ABDOMEN:  Soft, nontender, nondistended. Bowel sounds are positive. GENITALIA:  No CVA, flank or suprapubic tenderness. LOCOMOTOR:  No cyanosis, clubbing, or muscle or joint tenderness. SKIN:  No rashes, ulcers, or nodules. CNS:  Alert, awake, and oriented to time, person, and place. Cranial  nerves are intact. Sensation is intact to light touch and pain. Motor:  Normal muscle strength and tone. Appropriate mood and affect. WORKUP:  Sinus rhythm with sinus arrhythmia; left anterior hemiblock;  and anterolateral infarction, old; no new EKG abnormality on this level. Troponins less than 0.01 x3. Chemistry:  Sodium 134, potassium 3.1, BUN  10, creatinine 0.6. As I stated, white blood cells 7.9, hemoglobin  12.9, and platelets 432. Echocardiogram in 12/2019, ejection fraction 55% and cardiac  catheterization in 12/2019 revealed patent vein graft to the PDA with  mild luminal irregularity has been noted; otherwise, nonobstructive  lesion. LAD around focal mid LAD 50%. Left main patent, circumflex  mild luminal irregularity has been noted, so aggressive medical therapy  was recommended. At that time, it was done for elevated troponin. ASSESSMENT:  1.   Syncopal episode apparently recurrent and so, one follows dizziness  and the last one is after amphetamine use and the am not heading to stop any cocaine and on urine drug toxicology,  cocaine was negative except positive for amphetamine. 7.  Based on the course, we will gauge further care and provided the  above tests are acceptable, the patient may be stable to be discharged  and follow up with her cardiologist as outpatient. Thank you for allowing me to participate in the care of this patient. I  will follow up with you. Gianna Mancini.  Ryann Hernandez M.D.    D: 07/27/2020 14:08:51       T: 07/27/2020 15:40:15     [email or web address]  Job#: [id number]     Doc#: [id number]    CC:

## 2020-07-28 ENCOUNTER — APPOINTMENT (OUTPATIENT)
Dept: NON INVASIVE DIAGNOSTICS | Age: 50
End: 2020-07-28
Payer: MEDICAID

## 2020-07-28 LAB
ANION GAP SERPL CALCULATED.3IONS-SCNC: 7 MEQ/L (ref 8–16)
BUN BLDV-MCNC: 7 MG/DL (ref 7–22)
CALCIUM SERPL-MCNC: 8 MG/DL (ref 8.5–10.5)
CHLORIDE BLD-SCNC: 106 MEQ/L (ref 98–111)
CO2: 24 MEQ/L (ref 23–33)
CREAT SERPL-MCNC: 0.6 MG/DL (ref 0.4–1.2)
EKG ATRIAL RATE: 69 BPM
EKG P AXIS: 58 DEGREES
EKG P-R INTERVAL: 148 MS
EKG Q-T INTERVAL: 432 MS
EKG QRS DURATION: 116 MS
EKG QTC CALCULATION (BAZETT): 462 MS
EKG R AXIS: -48 DEGREES
EKG T AXIS: 68 DEGREES
EKG VENTRICULAR RATE: 69 BPM
GFR SERPL CREATININE-BSD FRML MDRD: > 90 ML/MIN/1.73M2
GLUCOSE BLD-MCNC: 190 MG/DL (ref 70–108)
GLUCOSE BLD-MCNC: 203 MG/DL (ref 70–108)
GLUCOSE BLD-MCNC: 206 MG/DL (ref 70–108)
GLUCOSE BLD-MCNC: 212 MG/DL (ref 70–108)
GLUCOSE BLD-MCNC: 224 MG/DL (ref 70–108)
HIV-2 AB: NEGATIVE
MAGNESIUM: 1.5 MG/DL (ref 1.6–2.4)
MRSA SCREEN: NORMAL
POTASSIUM SERPL-SCNC: 3.8 MEQ/L (ref 3.5–5.2)
SODIUM BLD-SCNC: 137 MEQ/L (ref 135–145)

## 2020-07-28 PROCEDURE — 99226 PR SBSQ OBSERVATION CARE/DAY 35 MINUTES: CPT | Performed by: HOSPITALIST

## 2020-07-28 PROCEDURE — 80048 BASIC METABOLIC PNL TOTAL CA: CPT

## 2020-07-28 PROCEDURE — 6370000000 HC RX 637 (ALT 250 FOR IP): Performed by: INTERNAL MEDICINE

## 2020-07-28 PROCEDURE — 99232 SBSQ HOSP IP/OBS MODERATE 35: CPT | Performed by: PHYSICIAN ASSISTANT

## 2020-07-28 PROCEDURE — 96366 THER/PROPH/DIAG IV INF ADDON: CPT

## 2020-07-28 PROCEDURE — G0378 HOSPITAL OBSERVATION PER HR: HCPCS

## 2020-07-28 PROCEDURE — 36415 COLL VENOUS BLD VENIPUNCTURE: CPT

## 2020-07-28 PROCEDURE — 82948 REAGENT STRIP/BLOOD GLUCOSE: CPT

## 2020-07-28 PROCEDURE — 6360000002 HC RX W HCPCS: Performed by: PHYSICIAN ASSISTANT

## 2020-07-28 PROCEDURE — 83735 ASSAY OF MAGNESIUM: CPT

## 2020-07-28 PROCEDURE — A9500 TC99M SESTAMIBI: HCPCS | Performed by: HOSPITALIST

## 2020-07-28 PROCEDURE — 93017 CV STRESS TEST TRACING ONLY: CPT | Performed by: INTERNAL MEDICINE

## 2020-07-28 PROCEDURE — 96361 HYDRATE IV INFUSION ADD-ON: CPT

## 2020-07-28 PROCEDURE — 6360000002 HC RX W HCPCS: Performed by: HOSPITALIST

## 2020-07-28 PROCEDURE — 96372 THER/PROPH/DIAG INJ SC/IM: CPT

## 2020-07-28 PROCEDURE — 93307 TTE W/O DOPPLER COMPLETE: CPT

## 2020-07-28 PROCEDURE — 2580000003 HC RX 258: Performed by: INTERNAL MEDICINE

## 2020-07-28 PROCEDURE — 3430000000 HC RX DIAGNOSTIC RADIOPHARMACEUTICAL: Performed by: HOSPITALIST

## 2020-07-28 PROCEDURE — 94760 N-INVAS EAR/PLS OXIMETRY 1: CPT

## 2020-07-28 PROCEDURE — 78452 HT MUSCLE IMAGE SPECT MULT: CPT

## 2020-07-28 PROCEDURE — 96365 THER/PROPH/DIAG IV INF INIT: CPT

## 2020-07-28 PROCEDURE — 6370000000 HC RX 637 (ALT 250 FOR IP): Performed by: PHYSICIAN ASSISTANT

## 2020-07-28 PROCEDURE — 6360000002 HC RX W HCPCS

## 2020-07-28 RX ORDER — MAGNESIUM SULFATE IN WATER 40 MG/ML
2 INJECTION, SOLUTION INTRAVENOUS ONCE
Status: COMPLETED | OUTPATIENT
Start: 2020-07-28 | End: 2020-07-31

## 2020-07-28 RX ADMIN — ENOXAPARIN SODIUM 40 MG: 40 INJECTION SUBCUTANEOUS at 11:48

## 2020-07-28 RX ADMIN — PANTOPRAZOLE SODIUM 40 MG: 40 TABLET, DELAYED RELEASE ORAL at 06:32

## 2020-07-28 RX ADMIN — ASPIRIN 81 MG 81 MG: 81 TABLET ORAL at 08:00

## 2020-07-28 RX ADMIN — GABAPENTIN 300 MG: 300 CAPSULE ORAL at 08:00

## 2020-07-28 RX ADMIN — ACETAMINOPHEN 650 MG: 325 TABLET ORAL at 03:03

## 2020-07-28 RX ADMIN — SODIUM CHLORIDE: 9 INJECTION, SOLUTION INTRAVENOUS at 02:53

## 2020-07-28 RX ADMIN — INSULIN GLARGINE 25 UNITS: 100 INJECTION, SOLUTION SUBCUTANEOUS at 08:00

## 2020-07-28 RX ADMIN — GABAPENTIN 300 MG: 300 CAPSULE ORAL at 20:00

## 2020-07-28 RX ADMIN — MICONAZOLE NITRATE: 20 POWDER TOPICAL at 08:04

## 2020-07-28 RX ADMIN — DIVALPROEX SODIUM 125 MG: 125 CAPSULE ORAL at 08:00

## 2020-07-28 RX ADMIN — INSULIN LISPRO 2 UNITS: 100 INJECTION, SOLUTION INTRAVENOUS; SUBCUTANEOUS at 20:50

## 2020-07-28 RX ADMIN — MAGNESIUM SULFATE HEPTAHYDRATE 2 G: 40 INJECTION, SOLUTION INTRAVENOUS at 08:09

## 2020-07-28 RX ADMIN — VENLAFAXINE HYDROCHLORIDE 75 MG: 75 CAPSULE, EXTENDED RELEASE ORAL at 08:00

## 2020-07-28 RX ADMIN — Medication 35 MILLICURIE: at 10:09

## 2020-07-28 RX ADMIN — OLANZAPINE 5 MG: 5 TABLET, FILM COATED ORAL at 20:00

## 2020-07-28 RX ADMIN — Medication 10.5 MG: at 20:54

## 2020-07-28 RX ADMIN — LEVOTHYROXINE SODIUM 50 MCG: 50 TABLET ORAL at 06:32

## 2020-07-28 RX ADMIN — MIRTAZAPINE 15 MG: 15 TABLET, FILM COATED ORAL at 20:00

## 2020-07-28 RX ADMIN — Medication 10.8 MILLICURIE: at 09:08

## 2020-07-28 RX ADMIN — INSULIN GLARGINE 25 UNITS: 100 INJECTION, SOLUTION SUBCUTANEOUS at 20:50

## 2020-07-28 RX ADMIN — MICONAZOLE NITRATE: 20 POWDER TOPICAL at 20:00

## 2020-07-28 RX ADMIN — METOPROLOL TARTRATE 25 MG: 50 TABLET, FILM COATED ORAL at 20:00

## 2020-07-28 RX ADMIN — HEPARIN SODIUM 5000 UNITS: 5000 INJECTION INTRAVENOUS; SUBCUTANEOUS at 02:53

## 2020-07-28 RX ADMIN — GABAPENTIN 300 MG: 300 CAPSULE ORAL at 15:12

## 2020-07-28 ASSESSMENT — PAIN DESCRIPTION - PAIN TYPE
TYPE: ACUTE PAIN
TYPE: CHRONIC PAIN
TYPE: CHRONIC PAIN

## 2020-07-28 ASSESSMENT — PAIN DESCRIPTION - FREQUENCY
FREQUENCY: CONTINUOUS

## 2020-07-28 ASSESSMENT — ENCOUNTER SYMPTOMS
COUGH: 0
BACK PAIN: 0
NAUSEA: 0
VOMITING: 0
ABDOMINAL PAIN: 0
RHINORRHEA: 0
CHEST TIGHTNESS: 0

## 2020-07-28 ASSESSMENT — PAIN DESCRIPTION - ONSET
ONSET: ON-GOING

## 2020-07-28 ASSESSMENT — PAIN DESCRIPTION - PROGRESSION
CLINICAL_PROGRESSION: NOT CHANGED
CLINICAL_PROGRESSION: GRADUALLY WORSENING
CLINICAL_PROGRESSION: GRADUALLY WORSENING

## 2020-07-28 ASSESSMENT — PAIN DESCRIPTION - DESCRIPTORS
DESCRIPTORS: ACHING

## 2020-07-28 ASSESSMENT — PAIN SCALES - GENERAL
PAINLEVEL_OUTOF10: 0
PAINLEVEL_OUTOF10: 1
PAINLEVEL_OUTOF10: 5
PAINLEVEL_OUTOF10: 6

## 2020-07-28 ASSESSMENT — PAIN - FUNCTIONAL ASSESSMENT
PAIN_FUNCTIONAL_ASSESSMENT: PREVENTS OR INTERFERES SOME ACTIVE ACTIVITIES AND ADLS
PAIN_FUNCTIONAL_ASSESSMENT: ACTIVITIES ARE NOT PREVENTED
PAIN_FUNCTIONAL_ASSESSMENT: ACTIVITIES ARE NOT PREVENTED

## 2020-07-28 ASSESSMENT — PAIN DESCRIPTION - ORIENTATION
ORIENTATION: LOWER

## 2020-07-28 ASSESSMENT — PAIN DESCRIPTION - LOCATION
LOCATION: BACK

## 2020-07-28 NOTE — PLAN OF CARE
Problem: Falls - Risk of:  Goal: Will remain free from falls  Description: Will remain free from falls  Outcome: Ongoing  Note: Pt free of falls this shift. Bed alarm on, call light and pt belongings within reach. Problem: Falls - Risk of:  Goal: Absence of physical injury  Description: Absence of physical injury  Outcome: Ongoing  Note: No physical injury this shift. Call light within reach and utilized. Problem: Pain:  Goal: Pain level will decrease  Description: Pain level will decrease  Outcome: Ongoing  Note: Pain Assessment: 0-10  Pain Level: 4   Patient's Stated Pain Goal: 3   Is pain goal met at this time? No     Non-Pharmaceutical Pain Intervention(s): Repositioned, Rest   Pt refused any pain medication     Problem: Skin Integrity:  Goal: Absence of new skin breakdown  Description: Absence of new skin breakdown  Outcome: Ongoing  Note: No new skin breakdown noted this shift. Pt turned every two hours. Pt able to use bedside commode. Problem: Serum Glucose Level - Abnormal:  Goal: Ability to maintain appropriate glucose levels has stabilized  Outcome: Ongoing  Note: Pt had elevated blood glucose this shift. Insulin administered. Will continue to monitor. Care plan reviewed with patient. Patient verbalize understanding of the plan of care and contribute to goal setting.

## 2020-07-28 NOTE — PLAN OF CARE
Problem: Falls - Risk of:  Goal: Will remain free from falls  Description: Will remain free from falls  Outcome: Ongoing  Note: Call light within reach, bed in lowest position, non skid footwear on, room door open, bed alarm on. Pt using call light appropriately. Problem: Pain:  Goal: Pain level will decrease  Description: Pain level will decrease  Outcome: Ongoing  Note: Patient rates pain 3/10 for chronic back pain. Pt states repositioning helps with pain control      Problem: Skin Integrity:  Goal: Will show no infection signs and symptoms  Description: Will show no infection signs and symptoms  Outcome: Ongoing  Note: Pt turns herself and aware importance of turning to prevent skin breakdown. Problem: Serum Glucose Level - Abnormal:  Goal: Ability to maintain appropriate glucose levels has stabilized  Outcome: Ongoing  Note: Monitoring blood sugar ac/hs. Problem: Cardiovascular  Goal: No DVT, peripheral vascular complications  Outcome: Ongoing  Note: Patient continues to have positive orthostatic BP. Anisa HORTON with cardiology made aware of positive orthostatics today. Pt is dizzy when standing. Denies chest pain. Vitals stable. Problem: Discharge Planning  Goal: Knowledge of discharge instructions  Description: Knowledge of discharge instructions  Outcome: Ongoing  Note: Pt plans to return home with friend.  following for discharge needs. Care plan reviewed with patient. Patient verbalizes understanding of the plan of care and contributes to goal setting.

## 2020-07-28 NOTE — CARE COORDINATION
7/28/20, 12:59 PM EDT    DISCHARGE ON GOING EVALUATION    Ashlee MCGRAW [facility] day: 2  Location: [facility]25/025-A Reason for admit: Hyperglycemia [R73.9]   Procedure: 7-28-20 Echo and Stress Test planned. Treatment Plan of Care: Cardiology has seen and plan in place. Stress Test and Echo planned. CHF nurse has visited with pt. Wound/Ostomy consulted for skin concerns. Some vaginal bleeding noted last night. Barriers to Discharge: Medical readiness. PCP: WAGNER Lemus CNP  Readmission Risk Score: 31%  Patient Goals/Plan/Treatment Preferences: Plans return home with no new services. CM to continue to follow for potential needs.

## 2020-07-28 NOTE — ED PROVIDER NOTES
stone, Liver failure ([facility] [address] and Vertigo. SURGICAL HISTORY      has a past surgical history that includes Coronary artery bypass graft;  section; and Kidney stone surgery. CURRENT MEDICATIONS       Current Discharge Medication List      CONTINUE these medications which have NOT CHANGED    Details   Melatonin 10 MG TABS Take 10 mg by mouth nightly as needed (sleep)      insulin lispro (ADMELOG) 100 UNIT/ML injection vial Inject 15 Units into the skin 3 times daily (before meals) \"per sliding scale\" according to patient      metoprolol tartrate (LOPRESSOR) 25 MG tablet Take 1 tablet by mouth 2 times daily  Qty: 60 tablet, Refills: 3      Mpmkza-CdSjy-RbCkdm-FA-Omega (MULTIVITAMIN/MINERALS PO) Take 1 tablet by mouth daily      aspirin 81 MG chewable tablet Take 81 mg by mouth daily      atomoxetine (STRATTERA) 25 MG capsule Take 25 mg by mouth daily      atorvastatin (LIPITOR) 20 MG tablet Take 1 tablet by mouth nightly      divalproex (DEPAKOTE) 125 MG DR tablet Take 1 tablet by mouth daily   Refills: 0      TRULICITY 0.35 YW/1.2PJ SOPN Inject 1 pen into the skin every 7 days On   Refills: 3      gabapentin (NEURONTIN) 300 MG capsule Take 300 mg by mouth 3 times daily.       insulin glargine (BASAGLAR KWIKPEN) 100 UNIT/ML injection pen Inject 25 Units into the skin 2 times daily       levothyroxine (SYNTHROID) 50 MCG tablet Take 50 mcg by mouth every morning (before breakfast)      losartan (COZAAR) 25 MG tablet Take 25 mg by mouth every morning (before breakfast)      meclizine (ANTIVERT) 12.5 MG tablet Take 12.5 mg by mouth every morning (before breakfast)      minocycline (MINOCIN;DYNACIN) 100 MG capsule Take 1 capsule by mouth 2 times daily  Refills: 2      mirtazapine (REMERON) 15 MG tablet Take 1 tablet by mouth nightly  Refills: 1      ondansetron (ZOFRAN-ODT) 4 MG disintegrating tablet Take 4 mg by mouth every 6 hours as needed      torsemide (DEMADEX) 20 MG tablet Take 1 tablet by mouth daily  Refills: 1      venlafaxine (EFFEXOR XR) 75 MG extended release capsule Take 1 capsule by mouth daily  Refills: 0      acetaminophen (TYLENOL) 325 MG tablet Take 650 mg by mouth every 4 hours as needed      lansoprazole (PREVACID) 15 MG delayed release capsule Take 15 mg by mouth every morning (before breakfast)      OLANZapine (ZYPREXA) 5 MG tablet Take 1 tablet by mouth nightly  Refills: 0             ALLERGIES     is allergic to meperidine; meperidine hcl; nitroglycerin; pregabalin; and promethazine. FAMILY HISTORY     has no family status information on file. family history is not on file. SOCIAL HISTORY      reports that she has been smoking. She has never used smokeless tobacco. She reports previous alcohol use. PHYSICAL EXAM     INITIAL VITALS:  height is 5' 5\" (1.651 m) and weight is 223 lb 8 oz (101.4 kg). Her oral temperature is 97.7 °F (36.5 °C). Her blood pressure is 127/73 and her pulse is 89. Her respiration is 19 and oxygen saturation is 97%. Physical Exam  Vitals signs and nursing note reviewed. Constitutional:       General: She is not in acute distress. Appearance: She is well-developed. She is not diaphoretic. HENT:      Head: Normocephalic and atraumatic. Eyes:      General:         Right eye: No discharge. Left eye: No discharge. Conjunctiva/sclera: Conjunctivae normal.   Neck:      Musculoskeletal: Normal range of motion. Trachea: No tracheal deviation. Cardiovascular:      Rate and Rhythm: Normal rate and regular rhythm. Heart sounds: Normal heart sounds. No murmur. No gallop. Comments: Normal capillary refill  Pulmonary:      Effort: Pulmonary effort is normal. No respiratory distress. Breath sounds: Normal breath sounds. No stridor. Abdominal:      General: Bowel sounds are normal.      Palpations: Abdomen is soft. Musculoskeletal: Normal range of motion. General: No tenderness or deformity.    Skin:     General: Skin is warm and dry. Capillary Refill: Capillary refill takes less than 2 seconds. Coloration: Skin is not pale. Findings: No erythema or rash. Neurological:      General: No focal deficit present. Mental Status: She is alert and oriented to person, place, and time. Cranial Nerves: No cranial nerve deficit. Sensory: No sensory deficit. Motor: No weakness.    Psychiatric:         Mood and Affect: Mood normal.         Behavior: Behavior normal.           DIFFERENTIAL DIAGNOSIS:   Including but not limited to vertigo, syncope, ACS, PE.    DIAGNOSTIC RESULTS     EKG: AllEKG's are interpreted by the Emergency Department Physician who either signs or Co-signs this chart in the absence of a cardiologist.       EKG Fall (Final result)    Component (Lab Inquiry)   Collection Time  Result Time  Ventricular Rate  Atrial Rate  P-R Interval  QRS Duration  Q-T Interval  QTc Calculation (Bazett)  P Axis  R Axis  T Axis    07/26/20 19:12:54  07/27/20 08:40:06  109  109  138  112  366  492  63  -53  95    Previous Results    06/23/20 15:35:41  06/23/20 15:35:41  92  92  142  110  382  472  69  -61  97    03/24/20 15:01:39  03/24/20 15:01:39  85  85  150  118  396  471  64  -54  81    12/13/19 17:20:14  12/13/19 18:14:11  63  63  148  112  442  452  -3  -66  19    12/13/19 04:21:47  12/13/19 18:14:05  68  68  150  116  434  461  70  -60  56           Final result                 Narrative:     Sinus tachycardia   Possible Left atrial enlargement   Left anterior fascicular block   Lateral infarct (cited on or before 13-DEC-2019)   Abnormal ECG   When compared with ECG of 23-JUN-2020 15:35,   No significant change was found   Confirmed by YASHIRA LANDRUM ([id number]) on 7/27/2020 8:40:02 AM                   RADIOLOGY: non-plain film images(s) such as CT, Ultrasound and MRI are read by the radiologist.  Plain radiographic images are visualized and preliminarily interpreted by the emergencyphysician unless otherwise stated below. CT HEAD WO CONTRAST   Final Result      No acute ischemic infarct, hemorrhage, or mass effect. **This report has been created using voice recognition software. It may contain minor errors which are inherent in voice recognition technology. **      Final report electronically signed by Dr. Mouna Jameson on 7/26/2020 10:01 PM      CTA CHEST [address]   Final Result      No acute pulmonary embolism. No acute cardiopulmonary disease. Status post CABG surgery. Subcentimeter calcified gallstone in the gallbladder. 2 to 3 mm nonobstructing right renal calculus. **This report has been created using voice recognition software. It may contain minor errors which are inherent in voice recognition technology. **      Final report electronically signed by Dr. Mouna Jameson on 7/26/2020 10:07 PM      XR CHEST PORTABLE   Final Result   There is no acute intrathoracic process. **This report has been created using voice recognition software. It may contain minor errors which are inherent in voice recognition technology. **      Final report electronically signed by Dr Alberta Pelletier on 7/26/2020 8:14 PM            LABS:   Labs Reviewed   COMPREHENSIVE METABOLIC PANEL W/ REFLEX TO MG FOR LOW K - Abnormal; Notable for the following components:       Result Value    Glucose 737 (*)     Sodium 130 (*)     Chloride 90 (*)     AST 75 (*)     Alkaline Phosphatase 223 (*)     ALT 71 (*)     All other components within normal limits   URINE RT REFLEX TO CULTURE - Abnormal; Notable for the following components:    Glucose, Ur >= 1000 (*)     Specific Gravity, Urine > 1.030 (*)     All other components within normal limits   TSH WITH REFLEX - Abnormal; Notable for the following components:    TSH 4.460 (*)     All other components within normal limits   BLOOD GAS, VENOUS - Abnormal; Notable for the following components:    PO2, Mixed 42 (*)     All other components within normal limits   ANION GAP - Abnormal; Notable for the following components:    Anion Gap 17.0 (*)     All other components within normal limits   GLOMERULAR FILTRATION RATE, ESTIMATED - Abnormal; Notable for the following components:    Est, Glom Filt Rate 66 (*)     All other components within normal limits   HEPATITIS PANEL, ACUTE - Abnormal; Notable for the following components:    Hepatitis C Ab POS (*)     All other components within normal limits   BASIC METABOLIC PANEL - Abnormal; Notable for the following components:    Potassium 3.2 (*)     Glucose 240 (*)     Calcium 8.4 (*)     All other components within normal limits   HEMOGLOBIN A1C - Abnormal; Notable for the following components:    Hemoglobin A1C 14.3 (*)     AVERAGE GLUCOSE 369 (*)     All other components within normal limits   GLOMERULAR FILTRATION RATE, ESTIMATED - Abnormal; Notable for the following components:    Est, Glom Filt Rate 66 (*)     All other components within normal limits   BASIC METABOLIC PANEL - Abnormal; Notable for the following components:    Sodium 134 (*)     Potassium 3.1 (*)     Glucose 220 (*)     Calcium 8.4 (*)     All other components within normal limits   POCT GLUCOSE - Abnormal; Notable for the following components:    POC Glucose 200 (*)     All other components within normal limits   POCT GLUCOSE - Abnormal; Notable for the following components:    POC Glucose 183 (*)     All other components within normal limits   POCT GLUCOSE - Abnormal; Notable for the following components:    POC Glucose 239 (*)     All other components within normal limits   POCT GLUCOSE - Abnormal; Notable for the following components:    POC Glucose 177 (*)     All other components within normal limits   POCT GLUCOSE - Abnormal; Notable for the following components:    POC Glucose 127 (*)     All other components within normal limits   POCT GLUCOSE - Abnormal; Notable for the following components:    POC Glucose 182 (*)     All other components within normal limits   VRE SCREEN BY PCR   CULTURE, MRSA, SCREENING   CULTURE, MRSA, SCREENING    Narrative:     Source: nares/nasal       Site:           Current Antibiotics: not stated   CBC WITH AUTO DIFFERENTIAL   ETHANOL   TROPONIN   URINE DRUG SCREEN   LIPASE   BETA-HYDROXYBUTYRATE   OSMOLALITY   T4, FREE   CBC WITH AUTO DIFFERENTIAL   TROPONIN   TROPONIN   ANION GAP   MAGNESIUM   ANION GAP   GLOMERULAR FILTRATION RATE, ESTIMATED   SEDIMENTATION RATE   POTASSIUM   COVID-19   HIV SCREEN   HCV ULTRA QUANT (VIRAL LOAD)   BASIC METABOLIC PANEL   MAGNESIUM   POCT GLUCOSE   POCT GLUCOSE   POCT GLUCOSE   POCT GLUCOSE   POCT GLUCOSE   POCT GLUCOSE         EMERGENCYDEPARTMENT COURSE AND MEDICAL DECISION MAKING:   Vitals:    Vitals:    07/27/20 0940 07/27/20 1525 07/27/20 2102 07/27/20 2331   BP:  123/71 116/67 127/73   Pulse:  80 79 89   Resp:  12 18 19   Temp:  97.5 °F (36.4 °C) 97.5 °F (36.4 °C) 97.7 °F (36.5 °C)   TempSrc:  Oral Oral Oral   SpO2: 97% 99% 94% 97%   Weight:       Height:             Pertinent Labs & Imaging studies reviewed. (See chart for details)           Controlled Substances Monitoring:     RX Monitoring 12/14/2019   Periodic Controlled Substance Monitoring Possible medication side effects, risk of tolerance/dependence & alternative treatments discussed. ;No signs of potential drug abuse or diversion identified. The patient was seen and evaluated in atimely manner for dizziness and falls. Appropriate labs and imaging are ordered and reviewed. Labs showed a very high glucose. No evidence of DKA or HHS. She is hydrated and treated with insulin. Electrolytes are reassuring. Given the elevated glucose and the patient's recurrent reported syncope and her significant history, I think the patient should be admitted for further evaluation and management including education. The patient will be admitted to Dr. Pacheco's service. She is agreeable to ERI. Corazon Craft Strict return precautions and follow up instructions were discussed with the patient with which the patient agrees     Physical assessment findings, diagnostic testing(s) if applicable, and vital signs reviewed with patient/patient representative. Questions answered. Medications asdirected, including OTC medications for supportive care. Education provided on medications. Differential diagnosis(s) discussed with patient/patient representative. Home care/self care instructions reviewed withpatient/patient representative. Patient is to follow-up with family care provider in 2-3 days if no improvement. Patient is to go to the emergency department if symptoms worsen. Patient/patient representative isaware of care plan, questions answered, verbalizes understanding and is in agreement.      ED Medications administered this visit:   Medications   aspirin chewable tablet 81 mg (81 mg Oral Given 7/27/20 0806)   gabapentin (NEURONTIN) capsule 300 mg (300 mg Oral Given 7/27/20 2113)   pantoprazole (PROTONIX) tablet 40 mg (40 mg Oral Given 7/27/20 0523)   levothyroxine (SYNTHROID) tablet 50 mcg (50 mcg Oral Given 7/27/20 0524)   losartan (COZAAR) tablet 25 mg ( Oral Automatically Held 8/2/20 0700)   metoprolol tartrate (LOPRESSOR) tablet 25 mg ( Oral Automatically Held 8/2/20 2100)   mirtazapine (REMERON) tablet 15 mg (15 mg Oral Given 7/27/20 2113)   OLANZapine (ZYPREXA) tablet 5 mg (5 mg Oral Given 7/27/20 2113)   torsemide (DEMADEX) tablet 20 mg ( Oral Automatically Held 8/2/20 0900)   venlafaxine (EFFEXOR XR) extended release capsule 75 mg ( Oral Unheld by provider 7/27/20 1636)   insulin regular (HUMULIN R;NOVOLIN R) injection 0-10 Units (has no administration in time range)   dextrose 50 % IV solution (has no administration in time range)   potassium chloride 10 mEq/100 mL IVPB (Peripheral Line) (10 mEq Intravenous New Bag 7/27/20 1101)   magnesium sulfate 2 g in 50 mL IVPB premix (has no administration in Patient's final impression and disposition and plan was determined by myself. CRITICAL CARE:   none    CONSULTS:  None    PROCEDURES:  None    FINAL IMPRESSION      1. Syncope and collapse    2. Hyperglycemia    3.  Noncompliance with medication regimen          DISPOSITION/PLAN   DISPOSITION Admitted 07/26/2020 10:58:53 PM        PATIENT REFERRED TO:  WAGNER Garces CNP  [address] [place] [address]  [address] [id number]    [facility] [place] [facility] Aug 13 @ 130pm 2nd floor [address]       DISCHARGE MEDICATIONS:  Current Discharge Medication List          (Please note that portions of this note were completed with a voice recognition program.  Efforts were made to edit thedictations but occasionally words are mis-transcribed.)    WAGNER Jackson CNP, APRN - CNP  07/28/20 0126

## 2020-07-28 NOTE — PROGRESS NOTES
was  able to get out of the bed and get to the recliner besides the chair and  she did not feel any dizziness or lightheadedness and did not have  difficulty to stand. The patient is trying to quit the use of drugs,  but she went back into it after quitting in between for a while. She  has some chest pressure. She has some short of breath on exertion and  also at times with that she has some chest pressure, that was happening  only yesterday after the methamphetamine use. The chest pressure and  short of breath on exertion was after the methamphetamine use and  otherwise, no chest pain before that and no short of breath before that. She has some psychiatric problem, depression and under treatment\"    Subjective (Events in last 24 hours): pt awake and alert. NAD. Pt still with lightheadedness, some spinning sensation, worse with moving head back and forth, and worse with standing.   Pt with intermittent cp and sob past one week      Objective:   /80   Pulse 62   Temp 98 °F (36.7 °C) (Oral)   Resp 16   Ht 5' 5\" (1.651 m)   Wt 230 lb 9.6 oz (104.6 kg)   SpO2 96%   BMI 38.37 kg/m²        TELEMETRY: nsr    Physical Exam:  General Appearance: alert and oriented to person, place and time, in no acute distress  Cardiovascular: normal rate, regular rhythm, normal S1 and S2, no murmurs, rubs, clicks, or gallops, distal pulses intact, no carotid bruits, no JVD  Pulmonary/Chest: clear to auscultation bilaterally- no wheezes, rales or rhonchi, normal air movement, no respiratory distress  Abdomen: soft, non-tender, non-distended, normal bowel sounds, no masses Extremities: no cyanosis, clubbing or edema, pulse   Skin: warm and dry, no rash or erythema  Head: normocephalic and atraumatic  Eyes: pupils equal, round, and reactive to light  Neck: supple and non-tender without mass, no thyromegaly   Musculoskeletal: normal range of motion, no joint swelling, deformity or tenderness  Neurological: alert, oriented, normal speech, no focal findings or movement disorder noted    Medications:    enoxaparin  40 mg Subcutaneous Daily    insulin lispro  0-12 Units Subcutaneous TID WC    insulin lispro  0-6 Units Subcutaneous Nightly    miconazole   Topical BID    insulin glargine  25 Units Subcutaneous BID    melatonin  10.5 mg Oral Nightly    divalproex  125 mg Oral Daily    aspirin  81 mg Oral Daily    gabapentin  300 mg Oral TID    pantoprazole  40 mg Oral QAM AC    levothyroxine  50 mcg Oral QAM AC    [Held by provider] losartan  25 mg Oral QAM AC    [Held by provider] metoprolol tartrate  25 mg Oral BID    mirtazapine  15 mg Oral Nightly    OLANZapine  5 mg Oral Nightly    [Held by provider] torsemide  20 mg Oral Daily    venlafaxine  75 mg Oral Daily      dextrose       glucose, 15 g, PRN  dextrose, 12.5 g, PRN  glucagon (rDNA), 1 mg, PRN  dextrose, 100 mL/hr, PRN  acetaminophen, 650 mg, Q4H PRN  dextrose, 12.5 g, PRN  potassium chloride, 10 mEq, PRN  magnesium sulfate, 2 g, PRN  sodium phosphate IVPB, 10 mmol, PRN    Or  sodium phosphate IVPB, 15 mmol, PRN    Or  sodium phosphate IVPB, 20 mmol, PRN        Diagnostics:  Stress test 7/28/20  Imaging Results:Calculated gated LVEF 60 %. The T.I.D. ratio was 1.21 . There was no evidence of definite reversible tracer uptake. The nuclear images is not suggestive for myocardial ischemia.      Conclusions      Summary   Lexiscan EKG stress test is not suggestive for ischemia. This Nuclear Medicine study was negative for ischemia . Signatures      ----------------------------------------------------------------   Electronically signed by Tito Ge MD (Interpreting   Cardiologist) on 07/28/2020 at 14:40    Lab Data:    Cardiac Enzymes:  No results for input(s): CKTOTAL, CKMB, CKMBINDEX, TROPONINI in the last 72 hours.     CBC:   Lab Results   Component Value Date    WBC 7.9 07/27/2020    RBC 4.34 07/27/2020    HGB 12.9 07/27/2020    HCT 38.7 07/27/2020  07/27/2020       CMP:    Lab Results   Component Value Date     07/28/2020    K 3.8 07/28/2020    K 3.9 07/26/2020     07/28/2020    CO2 24 07/28/2020    BUN 7 07/28/2020    CREATININE 0.6 07/28/2020    GFRAA >60 08/28/2019    LABGLOM >90 07/28/2020    GLUCOSE 206 07/28/2020    CALCIUM 8.0 07/28/2020       Hepatic Function Panel:    Lab Results   Component Value Date    ALKPHOS 223 07/26/2020    ALT 71 07/26/2020    AST 75 07/26/2020    PROT 6.8 07/26/2020    BILITOT 0.7 07/26/2020    LABALBU 3.9 07/26/2020       Magnesium:    Lab Results   Component Value Date    MG 1.5 07/28/2020       PT/INR:  No results found for: PROTIME, INR    HgBA1c:    Lab Results   Component Value Date    LABA1C 14.3 07/27/2020       FLP:    Lab Results   Component Value Date    TRIG 161 08/28/2019    HDL 63 08/28/2019       TSH:    Lab Results   Component Value Date    TSH 4.460 07/26/2020         Assessment:    Syncope - apparently recurrent and so, one follows dizziness  and the last one is after amphetamine use and the patient admitted that she does not drink enough of water and there was orthostatic drop with the patient could not even assume standing position yesterday on admission due to orthostasis. So, certainly there is orthostasis hypotension.   Today after hydration, no orthostatic drop    Orthostatic hypotension - normal orthostatics this am  Hx vertigo per patient  Hypomagnesemia/hypokalemia  S/p chest pain - trop neg x 3  Neg stress test 7/28/20  Hx CAD - s/p CABG, PCI  HTN  HLD  DM  Polysubstance abuse - recent methamphetamine, hx heroin, cocaine, and raisa  Hx hep C  Elevated LFTs  Noncompliance with f/up and meds    Plan:  · Echo pending  · Repeat orthostatics every shift  · Keep mag >2 and K >4, replace mag  · Cont asa  · Resume statin if ok with PCP - ?need GI consult  · Demadex, lopressor, cozaar on hold - consider starting lopressor 12.5 bid and monitor  · Event monitor upon discharge Electronically signed by Sigrid Rodney PA-C on 7/28/2020 at 2:06 PM

## 2020-07-28 NOTE — PROGRESS NOTES
Wound ostomy consulted for redness to groin and buttocks. Recommend staff to follow wound and tee order sets for MASD. May want to obtain order for miconazole powder if pt has moisture issues to area and to keep depends off area, turn patient.

## 2020-07-28 NOTE — PROGRESS NOTES
" Home Care Instructions                                                                                                                  Name:Servando Durham  Medical Record Number:[id number]  CSN: [id number]    YOB: 1952   Age: 64 y.o.  Sex: male  HT:1.676 m (5' 5.98\") WT: 107.3 kg (236 lb 8.9 oz)          Admit Date: 3/19/2017     Discharge Date:   Today's Date: 3/20/2017  Attending Doctor:  Celeste Jacobs M.D.                  Allergies:  Banana and Morphine            Discharge Instructions       Discharge Instructions    Discharged to home by car with relative. Discharged via wheelchair, hospital escort: Yes.  Special equipment needed: Not Applicable    Be sure to schedule a follow-up appointment with your primary care doctor or any specialists as instructed.     Discharge Plan:   Diet Plan: Discussed  Activity Level: Discussed  Confirmed Follow up Appointment: Patient to Call and Schedule Appointment  Confirmed Symptoms Management: Discussed  Medication Reconciliation Updated: Yes  Influenza Vaccine Indication: Not indicated: Previously immunized this influenza season and > 8 years of age    I understand that a diet low in cholesterol, fat, and sodium is recommended for good health. Unless I have been given specific instructions below for another diet, I accept this instruction as my diet prescription.   Other diet: LOW FAT    Special Instructions: None    · Is patient discharged on Warfarin / Coumadin?   No     · Is patient Post Blood Transfusion?  No    Depression / Suicide Risk    As you are discharged from this [facility] facility, it is important to learn how to keep safe from harming yourself.    Recognize the warning signs:  · Abrupt changes in personality, positive or negative- including increase in energy   · Giving away possessions  · Change in eating patterns- significant weight changes-  positive or negative  · Change in sleeping patterns- unable to sleep or sleeping all the " meds  11. Obersity w/ BMI 38.3  : Counseling offered  12. PT/OT to see        Expected discharge date:  TBD       Disposition:      [] Home                             [] TCU                             [] Rehab                             [] Psych                             [] SNF                             [] [place]                             [] Other- TBD    Chief Complaint:   Chief Complaint   Patient presents with    Fall    Vaginitis       Hospital Course: Patient was seen, examined and the medical chart was reviewed thoroughly today. In summary, 52 y. o.female admitted on 7/26/2020 for evaluation of syncope. Cardiology following. I took over care on 7/28.       Subjective (past 24 hours):   denies CP/SOB/focal neuro deficit/N/V/ fever/chills/palpitation/presyncope      Medications:  Reviewed    Infusion Medications    dextrose      sodium chloride 125 mL/hr at 07/28/20 0253     Scheduled Medications    insulin lispro  0-12 Units Subcutaneous TID WC    insulin lispro  0-6 Units Subcutaneous Nightly    miconazole   Topical BID    insulin glargine  25 Units Subcutaneous BID    melatonin  10.5 mg Oral Nightly    divalproex  125 mg Oral Daily    aspirin  81 mg Oral Daily    gabapentin  300 mg Oral TID    pantoprazole  40 mg Oral QAM AC    levothyroxine  50 mcg Oral QAM AC    [Held by provider] losartan  25 mg Oral QAM AC    [Held by provider] metoprolol tartrate  25 mg Oral BID    mirtazapine  15 mg Oral Nightly    OLANZapine  5 mg Oral Nightly    [Held by provider] torsemide  20 mg Oral Daily    venlafaxine  75 mg Oral Daily    heparin (porcine)  5,000 Units Subcutaneous 3 times per day     PRN Meds: glucose, dextrose, glucagon (rDNA), dextrose, acetaminophen, dextrose, potassium chloride, magnesium sulfate, sodium phosphate IVPB **OR** sodium phosphate IVPB **OR** sodium phosphate IVPB      Intake/Output Summary (Last 24 hours) at 7/28/2020 1129  Last data filed at time   · Unwillingness or inability to communicate  · Depression  · Unusual sadness, discouragement and loneliness  · Talk of wanting to die  · Neglect of personal appearance   · Rebelliousness- reckless behavior  · Withdrawal from people/activities they love  · Confusion- inability to concentrate     If you or a loved one observes any of these behaviors or has concerns about self-harm, here's what you can do:  · Talk about it- your feelings and reasons for harming yourself  · Remove any means that you might use to hurt yourself (examples: pills, rope, extension cords, firearm)  · Get professional help from the community (Mental Health, Substance Abuse, psychological counseling)  · Do not be alone:Call your Safe Contact- someone whom you trust who will be there for you.  · Call your local CRISIS HOTLINE 129-5313 or 220-644-2733  · Call your local Children's Mobile Crisis Response Team [place] (165) 424-9338 or www.[employer]  · Call the toll free National Suicide Prevention Hotlines   · National Suicide Prevention Lifeline 342-234-CMXI (8279)  · [place] Hope Line Network 800-SUICIDE (602-3304)           Discharge Medication Instructions:    Below are the medications your physician expects you to take upon discharge:    Review all your home medications and newly ordered medications with your doctor and/or pharmacist. Follow medication instructions as directed by your doctor and/or pharmacist.    Please keep your medication list with you and share with your physician.               Medication List      CONTINUE taking these medications        Instructions    aspirin EC 81 MG Tbec   Last time this was given:  81 mg on 3/20/2017 10:00 AM   Commonly known as:  ECOTRIN    Take 81 mg by mouth every day.   Dose:  81 mg       calcium carbonate 500 MG Chew   Commonly known as:  TUMS    Take 500 mg by mouth 1 time daily as needed.   Dose:  500 mg       ibuprofen 800 MG Tabs   Commonly known as:  MOTRIN    Take 800  7/28/2020 0300  Gross per 24 hour   Intake 4452. 15 ml   Output 0 ml   Net 4452. 15 ml       Diet:  DIET CARB CONTROL; Carb Control: 4 carb choices (60 gms)/meal    Exam:  /76   Pulse 72   Temp 97.7 °F (36.5 °C) (Oral)   Resp 16   Ht 5' 5\" (1.651 m)   Wt 230 lb 9.6 oz (104.6 kg)   SpO2 93%   BMI 38.37 kg/m²     General appearance: Obese, A&O x3, Not ill or toxic, in no apparent distress  HEENT:  ANDRE  EOM intact. Neck: Supple, with full range of motion. No jugular venous distention. Trachea midline. Respiratory:   NL A/E bilat with no adventitious sounds   Cardiovascular:  normal S1/S2 with no murmurs/gallops  Abdomen: Soft, non-tender, non-distended, no rigidity or peritoneal signs  Musculoskeletal: NL symmetrical A/PROM bilat U/L extremities   Skin: No rashes. No edema  Neurologic:  CN II-XII intact. NL symmetrical reflexes. NL gait and stance. NL Cerebellar exam. Power 5/5 all muscle groups U/L extremities. Toes downgoing  Capillary Refill: Brisk,< 3 seconds   Peripheral Pulses: +2 palpable, equal bilaterally        Labs:   Recent Labs     07/26/20 1915 07/27/20  0341   WBC 9.7 7.9   HGB 14.2 12.9   HCT 42.6 38.7    186     Recent Labs     07/27/20  0126 07/27/20  0341 07/27/20  1413 07/28/20  0600    134*  --  137   K 3.2* 3.1* 3.8 3.8   CL 99 100  --  106   CO2 24 24  --  24   BUN 11 10  --  7   CREATININE 0.9 0.6  --  0.6   CALCIUM 8.4* 8.4*  --  8.0*     Recent Labs     07/26/20 1915   AST 75*   ALT 71*   BILITOT 0.7   ALKPHOS 223*     No results for input(s): INR in the last 72 hours. No results for input(s): [place] Smoker in the last 72 hours. Urinalysis:      Lab Results   Component Value Date    NITRU NEGATIVE 07/26/2020    BLOODU NEGATIVE 07/26/2020    GLUCOSEU >= 1000 07/26/2020       Radiology:  Ct Head Wo Contrast    Result Date: 7/26/2020  PROCEDURE: CT HEAD WO CONTRAST CLINICAL INFORMATION: fall, struck head.  COMPARISON: None TECHNIQUE: Noncontrast 5 mm axial mg by mouth every 8 hours as needed (Leg pain).   Dose:  800 mg       lisinopril 40 MG tablet   Last time this was given:  40 mg on 3/20/2017 10:00 AM   Commonly known as:  PRINIVIL, ZESTRIL    Take 40 mg by mouth every day.   Dose:  40 mg       lovastatin 20 MG Tabs   Last time this was given:  20 mg on 3/19/2017  8:48 PM   Commonly known as:  MEVACOR    Take 20 mg by mouth every day.   Dose:  20 mg       metoprolol 25 MG Tabs   Last time this was given:  25 mg on 3/20/2017 10:00 AM   Commonly known as:  LOPRESSOR    Take 25 mg by mouth 2 times a day.   Dose:  25 mg               Instructions           Diet / Nutrition:    Follow any diet instructions given to you by your doctor or the dietician, including how much salt (sodium) you are allowed each day.    If you are overweight, talk to your doctor about a weight reduction plan.    Activity:    Remain physically active following your doctor's instructions about exercise and activity.    Rest often.     Any time you become even a little tired or short of breath, SIT DOWN and rest.    Worsening Symptoms:    Report any of the following signs and symptoms to the doctor's office immediately:    *Pain of jaw, arm, or neck  *Chest pain not relieved by medication                               *Dizziness or loss of consciousness  *Difficulty breathing even when at rest   *More tired than usual                                       *Bleeding drainage or swelling of surgical site  *Swelling of feet, ankles, legs or stomach                 *Fever (>100ºF)  *Pink or blood tinged sputum  *Weight gain (3lbs/day or 5lbs /week)           *Shock from internal defibrillator (if applicable)  *Palpitations or irregular heartbeats                *Cool and/or numb extremities    Stroke Awareness    Common Risk Factors for Stroke include:    Age  Atrial Fibrillation  Carotid Artery Stenosis  Diabetes Mellitus  Excessive alcohol consumption  High blood pressure  Overweight   Physical  images were obtained through the brain. Sagittal and coronal reconstructions were obtained and reviewed. All CT scans at this facility use dose modulation, iterative reconstruction, and/or weight-based dosing when appropriate to reduce radiation dose to as low as reasonably achievable. FINDINGS: Brain: There is no acute ischemic infarct, hemorrhage, midline shift, mass, or mass effect. There is no focal abnormality of brain parenchymal attenuation. Ventricles/basal cisterns: The ventricles and cisterns are of appropriate size and configuration for the patient's age. No evidence of obstructive hydrocephalus. Skull base/calvarium/osseous structures: Unremarkable Soft tissues: Unremarkable Intraorbital contents: Unremarkable Sinuses: Unremarkable; the imaged sinuses are clear without evidence of mucosal thickening or fluid levels. Mastoids: Unremarkable; the mastoid air cells are clear. No acute ischemic infarct, hemorrhage, or mass effect. **This report has been created using voice recognition software. It may contain minor errors which are inherent in voice recognition technology. ** Final report electronically signed by Dr. Kaiden Gomez on 7/26/2020 10:01 PM    Cta Chest W Wo Contrast    Result Date: 7/26/2020  PROCEDURE: CTA CHEST W WO CONTRAST CLINICAL INFORMATION: LOC, dizziness. . COMPARISON: Chest x-ray earlier today TECHNIQUE: 3 mm thick by 1.5 mm interval axial images were obtained through the chest after the administration of IV contrast.  A non-contrast localizer was obtained. Sagittal and coronal MIP 3D reconstructions were performed on the scanner. 80 mL Isovue-370 were administered intravenously. All CT scans at this facility use dose modulation, iterative reconstruction, and/or weight-based dosing when appropriate to reduce radiation dose to as low as reasonably achievable. FINDINGS: Lungs: There is no pneumonia or mass. There is very mild bilateral upper and lower lobe dependent atelectasis.  The inactivity  Smoking    Warning signs and symptoms of a stroke include:    *Sudden numbness or weakness of the face, arm or leg (especially on one side of the body).  *Sudden confusion, trouble speaking or understanding.  *Sudden trouble seeing in one or both eyes.  *Sudden trouble walking, dizziness, loss of balance or coordination.Sudden severe headache with no known cause.    It is very important to get treatment quickly when a stroke occurs. If you experience any of the above warning signs, call 911 immediately.                   Disclaimer         Quit Smoking / Tobacco Use:    I understand the use of any tobacco products increases my chance of suffering from future heart disease or stroke and could cause other illnesses which may shorten my life. Quitting the use of tobacco products is the single most important thing I can do to improve my health. For further information on smoking / tobacco cessation call a Toll Free Quit Line at 1-970.869.4250 (*[employer] [place]) or 1-126.239.7476 (American Lung Association) or you can access the web based program at www.lungBondsy.org.    [place] Tobacco Users Help Line:  (587) 140-4583       Toll Free: 1-585.706.2158  Quit Tobacco Program [facility] (597)972-1443    Crisis Hotline:    [place] Crisis Hotline:  4-764-QCEQIQL or 1-283.171.6513    [place] Crisis Hotline:    1-170.485.9900 or 775-366-1528    Discharge Survey:   Thank you for choosing [facility]. We hope we did everything we could to make your hospital stay a pleasant one. You may be receiving a phone survey and we would appreciate your time and participation in answering the questions. Your input is very valuable to us in our efforts to improve our service to our patients and their families.        My signature on this form indicates that:    1. I have reviewed and understand the above information.  2. My questions regarding this information have been answered to my  satisfaction.  3. I have formulated a plan with my discharge nurse to obtain my prescribed medications for home.                  Disclaimer         __________________________________                     __________       ________                       Patient Signature                                                 Date                    Time         trachea and central bronchi are unremarkable. Pleura: There is no pleural effusion. There is no pneumothorax. Heart: Heart size is normal. The patient is status post sternotomy for CABG surgery. There is no pericardial effusion. Pulmonary vasculature: There is adequate opacification of the pulmonary arteries. There is no pulmonary embolism. Afua and mediastinum: There is no hilar or mediastinal mass or adenopathy. Thoracic aorta/vascular: There is no thoracic aortic aneurysm or dissection. The imaged brachiocephalic arteries are unremarkable. Imaged upper abdomen: There is a subcentimeter calcified gallstone in the gallbladder. There is no biliary dilatation. There are a few hepatic calcified granulomas. There is a 2 to 3 mm nonobstructing upper/mid right renal calculus. Musculoskeletal system: Unremarkable Chest/body wall soft tissues: Unremarkable Thyroid: Unremarkable     No acute pulmonary embolism. No acute cardiopulmonary disease. Status post CABG surgery. Subcentimeter calcified gallstone in the gallbladder. 2 to 3 mm nonobstructing right renal calculus. **This report has been created using voice recognition software. It may contain minor errors which are inherent in voice recognition technology. ** Final report electronically signed by Dr. Cindy Ness on 7/26/2020 10:07 PM    Xr Chest Portable    Result Date: 7/26/2020  PROCEDURE: XR CHEST PORTABLE CLINICAL INFORMATION: 60-year-old female with dizziness. COMPARISON: Chest x-ray 6/23/2020. TECHNIQUE: AP upright view of the chest was obtained. FINDINGS: Metallic wire sutures are in the sternum There are low lung volumes which accentuate the pulmonary vasculature. The cardiac silhouette is normal in size. There is no significant pleural effusion or pneumothorax. Visualized portions of the upper abdomen are within normal limits. The osseous structures are intact. No acute fractures or suspicious osseous lesions. There is no acute intrathoracic process. **This report has been created using voice recognition software. It may contain minor errors which are inherent in voice recognition technology. ** Final report electronically signed by Dr Asiya Stone on 7/26/2020 8:14 PM    Vl Dup Carotid Bilateral    Result Date: 7/27/2020  PROCEDURE: VL DUP CAROTID BILATERAL CLINICAL INFORMATION: syncopal episodes. COMPARISON: No prior study. TECHNIQUE:B-mode, spectral Doppler waveform analysis, and color-flow ultrasound of the carotid and vertebral arteries was performed bilaterally. FINDINGS: RIGHT On grayscale images there is mild arthritic changes the carotid bulb. PSV/EDV DIST CCA-------->91/17cm/s PROX ICA-------->81/21cm/s ECA---------------->82/11cm/s VERT-------------->43/11cm/s LEFT On Grayscale imaging there is mild atherosclerotic changes at the carotid bulb. PSV/EDV DIST CCA-------->94/23cm/s PROX ICA-------->78/21cm/s ECA---------------->124/14cm/s VERT-------------->38/16cm/s     No hemodynamically significant stenosis by NASCET criteria in either internal carotid artery. Antegrade vertebral artery blood flow bilaterally. Carotid Stenosis Reference Using SRU Criteria:   Mild - <50% stenosis. ICA PSV is less than 125 cm/second and plaque or intimal thickening is visible. Moderate - 50-69% stenosis. ICA PSV is 125 to 230 cm/second and plaque is visible. Severe - 70-94% stenosis. ICA PSV is more than 230 cm/second and visible plaque with lumen narrowing is seen. Near occlusion - 95-99% stenosis. ICA PSV is variable and significant plaque with luminal narrowing is seen. Occluded - 100% stenosis. No flow identified. **This report has been created using voice recognition software. It may contain minor errors which are inherent in voice recognition technology. ** Final report electronically signed by Dr. Estuardo Allison on 7/27/2020 6:11 PM      Diet: DIET CARB CONTROL; Carb Control: 4 carb choices (60 gms)/meal    DVT prophylaxis: [x] Lovenox [] SCDs                                 [] SQ Heparin                                 [] Encourage ambulation           [] Already on Anticoagulation       Code Status: Full Code      Active Hospital Problems    Diagnosis Date Noted    Syncope and collapse [R55]     Methamphetamine abuse ([facility] [F15.10]     Abnormal thyroid function test [R94.6]     Polysubstance abuse ([facility] [F19.10]     Hypothyroidism [E03.9]     Obesity (BMI 30-39. 9) [E66.9]     Moderate episode of recurrent major depressive disorder ([facility] [F33.1]     Hyperglycemia [R73.9] 07/26/2020           With RN in room, patient was updated about the treatment plan, all the questions and concerns were addressed.         Electronically signed by Roge Pastor MD on 7/28/2020 at 8:45 AM

## 2020-07-28 NOTE — FLOWSHEET NOTE
07/27/20 1540   Provider Notification   Reason for Communication Evaluate; Review case   Provider Name Dr Feroz Ham   Provider Notification Physician   Method of Communication Face to face   Response See orders  (consult)   Notification Time [phone]

## 2020-07-28 NOTE — FLOWSHEET NOTE
07/27/20 1840   Provider Notification   Reason for Communication Evaluate; Review case   Provider Name Dr Jonathon Fuchs   Provider Notification Physician   Method of Communication Face to face   Response See orders   Notification Time 1840   0900: updated about patient pain- order for tylenol and tramadol placed  1130: asked about fluids at 250 mls/hour - fluids d/c   1430: updated Dr Jonathon Fuchs about MASD to groin and buttock- order for miconazole  1800: updated that patient became dizzy and positive orthos- fluids restarted, also order for covid test placed, rapid sent

## 2020-07-29 LAB
ANION GAP SERPL CALCULATED.3IONS-SCNC: 7 MEQ/L (ref 8–16)
BUN BLDV-MCNC: 7 MG/DL (ref 7–22)
CALCIUM SERPL-MCNC: 8.7 MG/DL (ref 8.5–10.5)
CHLORIDE BLD-SCNC: 104 MEQ/L (ref 98–111)
CO2: 26 MEQ/L (ref 23–33)
CREAT SERPL-MCNC: 0.6 MG/DL (ref 0.4–1.2)
GFR SERPL CREATININE-BSD FRML MDRD: > 90 ML/MIN/1.73M2
GLUCOSE BLD-MCNC: 116 MG/DL (ref 70–108)
GLUCOSE BLD-MCNC: 139 MG/DL (ref 70–108)
GLUCOSE BLD-MCNC: 165 MG/DL (ref 70–108)
GLUCOSE BLD-MCNC: 206 MG/DL (ref 70–108)
GLUCOSE BLD-MCNC: 293 MG/DL (ref 70–108)
MAGNESIUM: 1.8 MG/DL (ref 1.6–2.4)
MRSA SCREEN: NORMAL
POTASSIUM SERPL-SCNC: 4.1 MEQ/L (ref 3.5–5.2)
SODIUM BLD-SCNC: 137 MEQ/L (ref 135–145)

## 2020-07-29 PROCEDURE — 6360000002 HC RX W HCPCS: Performed by: HOSPITALIST

## 2020-07-29 PROCEDURE — 80048 BASIC METABOLIC PNL TOTAL CA: CPT

## 2020-07-29 PROCEDURE — 36415 COLL VENOUS BLD VENIPUNCTURE: CPT

## 2020-07-29 PROCEDURE — 6370000000 HC RX 637 (ALT 250 FOR IP): Performed by: PHYSICIAN ASSISTANT

## 2020-07-29 PROCEDURE — 6370000000 HC RX 637 (ALT 250 FOR IP): Performed by: INTERNAL MEDICINE

## 2020-07-29 PROCEDURE — 99214 OFFICE O/P EST MOD 30 MIN: CPT | Performed by: NURSE PRACTITIONER

## 2020-07-29 PROCEDURE — 82948 REAGENT STRIP/BLOOD GLUCOSE: CPT

## 2020-07-29 PROCEDURE — G0378 HOSPITAL OBSERVATION PER HR: HCPCS

## 2020-07-29 PROCEDURE — 83735 ASSAY OF MAGNESIUM: CPT

## 2020-07-29 PROCEDURE — 99226 PR SBSQ OBSERVATION CARE/DAY 35 MINUTES: CPT | Performed by: HOSPITALIST

## 2020-07-29 PROCEDURE — 96372 THER/PROPH/DIAG INJ SC/IM: CPT

## 2020-07-29 RX ADMIN — GABAPENTIN 300 MG: 300 CAPSULE ORAL at 10:14

## 2020-07-29 RX ADMIN — MICONAZOLE NITRATE: 20 POWDER TOPICAL at 20:31

## 2020-07-29 RX ADMIN — ACETAMINOPHEN 650 MG: 325 TABLET ORAL at 14:58

## 2020-07-29 RX ADMIN — ASPIRIN 81 MG 81 MG: 81 TABLET ORAL at 10:14

## 2020-07-29 RX ADMIN — Medication 10.5 MG: at 21:23

## 2020-07-29 RX ADMIN — ENOXAPARIN SODIUM 40 MG: 40 INJECTION SUBCUTANEOUS at 10:15

## 2020-07-29 RX ADMIN — ACETAMINOPHEN 650 MG: 325 TABLET ORAL at 10:14

## 2020-07-29 RX ADMIN — METOPROLOL TARTRATE 25 MG: 50 TABLET, FILM COATED ORAL at 10:15

## 2020-07-29 RX ADMIN — INSULIN LISPRO 3 UNITS: 100 INJECTION, SOLUTION INTRAVENOUS; SUBCUTANEOUS at 20:28

## 2020-07-29 RX ADMIN — GABAPENTIN 300 MG: 300 CAPSULE ORAL at 20:31

## 2020-07-29 RX ADMIN — VENLAFAXINE HYDROCHLORIDE 75 MG: 75 CAPSULE, EXTENDED RELEASE ORAL at 10:14

## 2020-07-29 RX ADMIN — PANTOPRAZOLE SODIUM 40 MG: 40 TABLET, DELAYED RELEASE ORAL at 06:09

## 2020-07-29 RX ADMIN — METOPROLOL TARTRATE 25 MG: 50 TABLET, FILM COATED ORAL at 21:13

## 2020-07-29 RX ADMIN — INSULIN GLARGINE 25 UNITS: 100 INJECTION, SOLUTION SUBCUTANEOUS at 10:15

## 2020-07-29 RX ADMIN — MIRTAZAPINE 15 MG: 15 TABLET, FILM COATED ORAL at 20:31

## 2020-07-29 RX ADMIN — ACETAMINOPHEN 650 MG: 325 TABLET ORAL at 21:12

## 2020-07-29 RX ADMIN — MICONAZOLE NITRATE: 20 POWDER TOPICAL at 10:16

## 2020-07-29 RX ADMIN — OLANZAPINE 5 MG: 5 TABLET, FILM COATED ORAL at 20:31

## 2020-07-29 RX ADMIN — GABAPENTIN 300 MG: 300 CAPSULE ORAL at 14:58

## 2020-07-29 RX ADMIN — DIVALPROEX SODIUM 125 MG: 125 CAPSULE ORAL at 10:14

## 2020-07-29 RX ADMIN — LEVOTHYROXINE SODIUM 50 MCG: 50 TABLET ORAL at 06:09

## 2020-07-29 RX ADMIN — INSULIN GLARGINE 25 UNITS: 100 INJECTION, SOLUTION SUBCUTANEOUS at 21:12

## 2020-07-29 ASSESSMENT — PAIN DESCRIPTION - PAIN TYPE
TYPE: CHRONIC PAIN
TYPE: CHRONIC PAIN

## 2020-07-29 ASSESSMENT — PAIN DESCRIPTION - ONSET
ONSET: ON-GOING
ONSET: ON-GOING

## 2020-07-29 ASSESSMENT — PAIN DESCRIPTION - PROGRESSION
CLINICAL_PROGRESSION: GRADUALLY WORSENING
CLINICAL_PROGRESSION: GRADUALLY IMPROVING

## 2020-07-29 ASSESSMENT — PAIN SCALES - GENERAL
PAINLEVEL_OUTOF10: 3
PAINLEVEL_OUTOF10: 6
PAINLEVEL_OUTOF10: 3
PAINLEVEL_OUTOF10: 5
PAINLEVEL_OUTOF10: 3
PAINLEVEL_OUTOF10: 6

## 2020-07-29 ASSESSMENT — PAIN - FUNCTIONAL ASSESSMENT
PAIN_FUNCTIONAL_ASSESSMENT: ACTIVITIES ARE NOT PREVENTED
PAIN_FUNCTIONAL_ASSESSMENT: ACTIVITIES ARE NOT PREVENTED

## 2020-07-29 ASSESSMENT — PAIN DESCRIPTION - FREQUENCY
FREQUENCY: CONTINUOUS
FREQUENCY: CONTINUOUS

## 2020-07-29 ASSESSMENT — PAIN DESCRIPTION - DESCRIPTORS
DESCRIPTORS: ACHING
DESCRIPTORS: ACHING

## 2020-07-29 ASSESSMENT — PAIN DESCRIPTION - LOCATION
LOCATION: BACK
LOCATION: BACK

## 2020-07-29 ASSESSMENT — PAIN DESCRIPTION - DIRECTION
RADIATING_TOWARDS: NECK
RADIATING_TOWARDS: UP TO NECK

## 2020-07-29 ASSESSMENT — PAIN DESCRIPTION - ORIENTATION
ORIENTATION: LOWER
ORIENTATION: LOWER

## 2020-07-29 NOTE — PROGRESS NOTES
Cardiology Progress Note      Patient:  Zamzam Ching  YOB: 1970  MRN: [id number]   Acct: [de-identified]  [facility] Date:  7/26/2020  Primary Cardiologist: Dr Ivan Munguia, seen by Dr. Davion Wheeler Has not followed with office since Dr. Ivan Munguia consulted 12/13/19    Per Dr. Fani Vaca consult note:  [address]:  Syncopal episode in the last one week.     PRIMARY CARDIOLOGIST:  Used to be in [place] and now recently moving  here and has been seen by Dr. Ramya Wong like seven months ago and missed an  appointment in the office once.     HISTORY OF PRESENT ILLNESS:  This is a 80-year-old female patient with  past medical history of coronary artery disease, status post previous  stent and later subsequent coronary artery bypass single vessel in 2012;  IV drug use, methamphetamine and cocaine use; gastroesophageal reflux  disease; depression; hypertension; hyperlipidemia; chronic hep C;  hypothyroidism presents to the emergency room with two episodes of  syncope in the last one week. On 07/17/2020, she had syncopal episode  after vertigo and she recovered, seeking medical advice here. The day  before on 07/25/2020, the patient had felt dizzy, lightheaded, and then  she passed out. At this time, second episode, the patient was using  methamphetamine and she does not know exactly what happened in the  sequence of event. Initially on the first one, a week ago, she was  feeling dizzy. The patient stated she had two episodes of syncope in  the last two weeks. She does not have syncope before that and today  after hydration overnight, she was feeling better and no more dizziness  and yesterday on arrival to the emergency room, systolic blood pressure  408 on supine and sitting it was 90 and standing, the patient could not  stand because of severe dizziness and has to assume sitting or supine  position, so definite orthostatic evaluation cannot be performed.    However, there is a drop of 40 mmHg from just supine to sitting and so,  it looks like suggestive for orthostatic hypotension. Today, she was  able to get out of the bed and get to the recliner besides the chair and  she did not feel any dizziness or lightheadedness and did not have  difficulty to stand. The patient is trying to quit the use of drugs,  but she went back into it after quitting in between for a while. She  has some chest pressure. She has some short of breath on exertion and  also at times with that she has some chest pressure, that was happening  only yesterday after the methamphetamine use. The chest pressure and  short of breath on exertion was after the methamphetamine use and  otherwise, no chest pain before that and no short of breath before that. She has some psychiatric problem, depression and under treatment. Subjective (Events in last 24 hours): F/U syncope. Resting in bed in nad. Reports \" still being dizzy\". Denies chest pain or sob.  VSS      Objective:   BP (!) 114/56   Pulse 61   Temp 98 °F (36.7 °C) (Oral)   Resp 16   Ht 5' 5\" (1.651 m)   Wt 233 lb (105.7 kg)   SpO2 94%   BMI 38.77 kg/m²        TELEMETRY: SR    Physical Exam:  General Appearance: alert and oriented to person, place and time, in no acute distress  Cardiovascular: normal rate, regular rhythm, normal S1 and S2, no murmurs, rubs, clicks, or gallops, distal pulses intact, no carotid bruits, no JVD  Pulmonary/Chest: clear to auscultation bilaterally- no wheezes, rales or rhonchi, normal air movement, no respiratory distress  Abdomen: soft, non-tender, non-distended, normal bowel sounds, no masses   Extremities: no cyanosis, clubbing or edema, pulses palpable  Skin: warm and dry, no rash or erythema  Head: normocephalic and atraumatic  Eyes: pupils equal, round, and reactive to light  Neck: supple and non-tender without mass, no thyromegaly   Musculoskeletal: normal range of motion, no joint swelling, deformity or tenderness  Neurological: alert, oriented, normal speech, no 07/27/2020    RBC 4.34 07/27/2020    HGB 12.9 07/27/2020    HCT 38.7 07/27/2020     07/27/2020       CMP:    Lab Results   Component Value Date     07/29/2020    K 4.1 07/29/2020    K 3.9 07/26/2020     07/29/2020    CO2 26 07/29/2020    BUN 7 07/29/2020    CREATININE 0.6 07/29/2020    GFRAA >60 08/28/2019    LABGLOM >90 07/29/2020    GLUCOSE 139 07/29/2020    CALCIUM 8.7 07/29/2020       Hepatic Function Panel:    Lab Results   Component Value Date    ALKPHOS 223 07/26/2020    ALT 71 07/26/2020    AST 75 07/26/2020    PROT 6.8 07/26/2020    BILITOT 0.7 07/26/2020    LABALBU 3.9 07/26/2020       Magnesium:    Lab Results   Component Value Date    MG 1.8 07/29/2020       PT/INR:  No results found for: PROTIME, INR    HgBA1c:    Lab Results   Component Value Date    LABA1C 14.3 07/27/2020       FLP:    Lab Results   Component Value Date    TRIG 161 08/28/2019    HDL 63 08/28/2019       TSH:    Lab Results   Component Value Date    TSH 4.460 07/26/2020         Assessment:  · Recurrent syncopal episodes: likely multifactorial-polypharmacy/dysautonomia/substance abuse  CT head neg, Carotid US with no significant stenosis, No PE per CTA chest, Echo preserved EF an no WMA,   · Chest pain resolved  · Dizziness: history of dizziness, h/o vertigo  · Orthostatic Hypotension: now with normal orthostatic vs  · EF 60, no WMA per echo 7/28/20  · CAD: h/o CABG, PCI  Stress test negative for ischemia 7/28/20  · HTN  · HLP  · DM  · Polysubstance abuse: recent methamphetamine, h/o heroin, cocaine, and marijuana  · H/o hep C  · Elevated LFTs  · Obesity, BMI 38.3  · Noncompliance with f/up and meds      Plan:  · Keep K+ > 4, magnesium > 2--Replace prn  · Continue asa, lopressor  · 30 day event monitor at discharge  · F/U with Dr. Linette Pereira as scheduled 9/3/2020  · F/U as scheduled with CHF clinic         Electronically signed by WAGNER Obrien CNP on 7/29/2020 at 3:34 PM

## 2020-07-29 NOTE — CARE COORDINATION
7/29/20, 9:42 AM EDT  Client plans home with friend, new CHF Clinic (see AVS) when medically cleared  Patient goals/plan/ treatment preferences discussed by  and . Patient goals/plan/ treatment preferences reviewed with patient/ family. Patient/ family verbalize understanding of discharge plan and are in agreement with goal/plan/treatment preferences. Understanding was demonstrated using the teach back method. AVS provided by RN at time of discharge, which includes all necessary medical information pertaining to the patients current course of illness, treatment, post-discharge goals of care, and treatment preferences.

## 2020-07-29 NOTE — PROGRESS NOTES
Hospitalist Progress Note    Patient:  Karlene Huff      Unit/Bed:4K-25/025-A    YOB: 1970    MRN: [id number]       Acct: [de-identified]     PCP: WAGNER Carranza CNP    Date of Admission: 7/26/2020    Active Hospital Problems    Diagnosis Date Noted    Syncope and collapse [R55]     Methamphetamine abuse ([facility] [address] [F15.10]     Abnormal thyroid function test [R94.6]     Polysubstance abuse ([facility] [address] [F19.10]     Hypothyroidism [E03.9]     Obesity (BMI 30-39. 9) [E66.9]     Moderate episode of recurrent major depressive disorder ([facility] [address] [F33.1]     Hyperglycemia [R73.9] 07/26/2020       Assessment/Plan:  1. Recurrent syncopal episodes associated with CP: NYD etiology. Likely multifactorial. Polypharmacy? +/- dysautonomia +/- substance use. No arrhythmias on Tele. CT head unremarkable. CTA of chest unremarkable for PE. Carotid US CTA of neck. Last echo showed an EF of 50-55% Echocardiogram and otherwise non-contributory. No orthostasis? Cardiology following w/ planned stress test and TTE today. on Torsemide currently held due to question of polypharmacy. Repeat orthostatics in am. Metoprolol held due to stress test which will resume after procedure. D/c'ed NS at 125 cc/hr. PT/OT to see  7/29: Cardiac W/U non-contributory. Orthosis resolved. Pt still feels dizzy, PT/OT to see. Otherwise, will plan for BP med/diuretic regimen modification and 30-day Event monitor at discharge  2. DM II w/ Hyperglycemia: Due to non-adherence to mediations. Poorly controlled w/ A1C 14.3. BS improved on home MDI; will consider optimizing MDI regimen; also arrange for diabetes education clinic    3. Relapsed methamphetamine abuse: Seen by Addiction services and OP resources offered   4. Hypokalemia: Resolved w/ replacement. Will monitor  5. Chronic hepatitis C: not on treatment; Will need GI outpatient F/U for consideration of antiviral therapy  6.  History of polysubstance abuse: denies cocaine use; on BB; will cousel about risks associated w/ Cocaine use  7. Hypothyroidism: on Levothyroxine; TSH marginally elevated; PCP to monitor and consider dose adjustment  5. Hx of HTN: BP well-controlled on current in-hospital regimen which continued at discharge; given#1 did not resume ARB at [address]; PCP to possibly consider resuming for nephropathy protection  8. GERD: on PPI  9. Hx of depression: Continue home meds  10. Obersity w/ BMI 38.3  : Counseling offered  11. PT/OT to see        Expected discharge date:  TBD (delayed d/t concerns for pt's safety at home alone given ongoing dizziness)     Disposition:      [] Home                             [] TCU                             [] Rehab                             [] Psych                             [] SNF                             [] [place]                             [] Other- TBD    Chief Complaint:   Chief Complaint   Patient presents with    Fall    Vaginitis       Hospital Course: Patient was seen, examined and the medical chart was reviewed thoroughly today. In summary, 52 y. o.female admitted on 7/26/2020 for evaluation of syncope. Cardiology following. I took over care on 7/28.       Subjective (past 24 hours):   still feels dizzy w/ ambulation; denies CP/SOB/focal neuro deficit/N/V/ fever/chills/palpitation/presyncope      Medications:  Reviewed    Infusion Medications    dextrose       Scheduled Medications    enoxaparin  40 mg Subcutaneous Daily    magnesium sulfate  2 g Intravenous Once    insulin lispro  0-12 Units Subcutaneous TID WC    insulin lispro  0-6 Units Subcutaneous Nightly    miconazole   Topical BID    insulin glargine  25 Units Subcutaneous BID    melatonin  10.5 mg Oral Nightly    divalproex  125 mg Oral Daily    aspirin  81 mg Oral Daily    gabapentin  300 mg Oral TID    pantoprazole  40 mg Oral QAM AC    levothyroxine  50 mcg Oral QAM AC    [Held by provider] losartan  25 mg Oral QAM AC    metoprolol tartrate  25 mg Oral BID    mirtazapine  15 mg Oral Nightly    OLANZapine  5 mg Oral Nightly    [Held by provider] torsemide  20 mg Oral Daily    venlafaxine  75 mg Oral Daily     PRN Meds: glucose, dextrose, glucagon (rDNA), dextrose, acetaminophen, dextrose, potassium chloride, magnesium sulfate, sodium phosphate IVPB **OR** sodium phosphate IVPB **OR** sodium phosphate IVPB      Intake/Output Summary (Last 24 hours) at 7/29/2020 1045  Last data filed at 7/29/2020 0315  Gross per 24 hour   Intake 1669.79 ml   Output 0 ml   Net 1669.79 ml       Diet:  DIET CARB CONTROL; Carb Control: 4 carb choices (60 gms)/meal    Exam:  /60   Pulse 65   Temp 98 °F (36.7 °C) (Oral)   Resp 18   Ht 5' 5\" (1.651 m)   Wt 233 lb (105.7 kg)   SpO2 95%   BMI 38.77 kg/m²     General appearance: Obese, A&O x3, Not ill or toxic, in no apparent distress  HEENT:  ANDRE  EOM intact. Neck: Supple, with full range of motion. No jugular venous distention. Trachea midline. Respiratory:   NL A/E bilat with no adventitious sounds   Cardiovascular:  normal S1/S2 with no murmurs/gallops  Abdomen: Soft, non-tender, non-distended, no rigidity or peritoneal signs  Musculoskeletal: NL symmetrical A/PROM bilat U/L extremities   Skin: No rashes. No edema  Neurologic:  CN II-XII intact. NL symmetrical reflexes. NL gait and stance. NL Cerebellar exam. Power 5/5 all muscle groups U/L extremities.  Toes downgoing  Capillary Refill: Brisk,< 3 seconds   Peripheral Pulses: +2 palpable, equal bilaterally        Labs:   Recent Labs     07/26/20  1915 07/27/20  0341   WBC 9.7 7.9   HGB 14.2 12.9   HCT 42.6 38.7    186     Recent Labs     07/27/20  0341 07/27/20  1413 07/28/20  0600 07/29/20  0548   *  --  137 137   K 3.1* 3.8 3.8 4.1     --  106 104   CO2 24  --  24 26   BUN 10  --  7 7   CREATININE 0.6  --  0.6 0.6   CALCIUM 8.4*  --  8.0* 8.7     Recent Labs     07/26/20 1915   AST 75*   ALT 71*   BILITOT 0.7   ALKPHOS 223*     No results for input(s): INR in the last 72 hours. No results for input(s): Gino Keel in the last 72 hours. Urinalysis:      Lab Results   Component Value Date    NITRU NEGATIVE 07/26/2020    BLOODU NEGATIVE 07/26/2020    GLUCOSEU >= 1000 07/26/2020       Radiology:  Ct Head Wo Contrast    Result Date: 7/26/2020  PROCEDURE: CT HEAD WO CONTRAST CLINICAL INFORMATION: fall, struck head. COMPARISON: None TECHNIQUE: Noncontrast 5 mm axial images were obtained through the brain. Sagittal and coronal reconstructions were obtained and reviewed. All CT scans at this facility use dose modulation, iterative reconstruction, and/or weight-based dosing when appropriate to reduce radiation dose to as low as reasonably achievable. FINDINGS: Brain: There is no acute ischemic infarct, hemorrhage, midline shift, mass, or mass effect. There is no focal abnormality of brain parenchymal attenuation. Ventricles/basal cisterns: The ventricles and cisterns are of appropriate size and configuration for the patient's age. No evidence of obstructive hydrocephalus. Skull base/calvarium/osseous structures: Unremarkable Soft tissues: Unremarkable Intraorbital contents: Unremarkable Sinuses: Unremarkable; the imaged sinuses are clear without evidence of mucosal thickening or fluid levels. Mastoids: Unremarkable; the mastoid air cells are clear. No acute ischemic infarct, hemorrhage, or mass effect. **This report has been created using voice recognition software. It may contain minor errors which are inherent in voice recognition technology. ** Final report electronically signed by Dr. Martín Finch on 7/26/2020 10:01 PM    Cta Chest W Wo Contrast    Result Date: 7/26/2020  PROCEDURE: CTA CHEST W WO CONTRAST CLINICAL INFORMATION: LOC, dizziness. . COMPARISON: Chest x-ray earlier today TECHNIQUE: 3 mm thick by 1.5 mm interval axial images were obtained through the chest after the administration of IV contrast.  A non-contrast localizer was obtained. Sagittal and coronal MIP 3D reconstructions were performed on the scanner. 80 mL Isovue-370 were administered intravenously. All CT scans at this facility use dose modulation, iterative reconstruction, and/or weight-based dosing when appropriate to reduce radiation dose to as low as reasonably achievable. FINDINGS: Lungs: There is no pneumonia or mass. There is very mild bilateral upper and lower lobe dependent atelectasis. The trachea and central bronchi are unremarkable. Pleura: There is no pleural effusion. There is no pneumothorax. Heart: Heart size is normal. The patient is status post sternotomy for CABG surgery. There is no pericardial effusion. Pulmonary vasculature: There is adequate opacification of the pulmonary arteries. There is no pulmonary embolism. Afua and mediastinum: There is no hilar or mediastinal mass or adenopathy. Thoracic aorta/vascular: There is no thoracic aortic aneurysm or dissection. The imaged brachiocephalic arteries are unremarkable. Imaged upper abdomen: There is a subcentimeter calcified gallstone in the gallbladder. There is no biliary dilatation. There are a few hepatic calcified granulomas. There is a 2 to 3 mm nonobstructing upper/mid right renal calculus. Musculoskeletal system: Unremarkable Chest/body wall soft tissues: Unremarkable Thyroid: Unremarkable     No acute pulmonary embolism. No acute cardiopulmonary disease. Status post CABG surgery. Subcentimeter calcified gallstone in the gallbladder. 2 to 3 mm nonobstructing right renal calculus. **This report has been created using voice recognition software. It may contain minor errors which are inherent in voice recognition technology. ** Final report electronically signed by Dr. Bree Hinojosa on 7/26/2020 10:07 PM    Xr Chest Portable    Result Date: 7/26/2020  PROCEDURE: XR CHEST PORTABLE CLINICAL INFORMATION: 51-year-old female with dizziness. COMPARISON: Chest x-ray 6/23/2020.  TECHNIQUE: AP upright view of the chest was obtained. FINDINGS: Metallic wire sutures are in the sternum There are low lung volumes which accentuate the pulmonary vasculature. The cardiac silhouette is normal in size. There is no significant pleural effusion or pneumothorax. Visualized portions of the upper abdomen are within normal limits. The osseous structures are intact. No acute fractures or suspicious osseous lesions. There is no acute intrathoracic process. **This report has been created using voice recognition software. It may contain minor errors which are inherent in voice recognition technology. ** Final report electronically signed by Dr Addi Rice on 7/26/2020 8:14 PM    Vl Dup Carotid Bilateral    Result Date: 7/27/2020  PROCEDURE: VL DUP CAROTID BILATERAL CLINICAL INFORMATION: syncopal episodes. COMPARISON: No prior study. TECHNIQUE:B-mode, spectral Doppler waveform analysis, and color-flow ultrasound of the carotid and vertebral arteries was performed bilaterally. FINDINGS: RIGHT On grayscale images there is mild arthritic changes the carotid bulb. PSV/EDV DIST CCA-------->91/17cm/s PROX ICA-------->81/21cm/s ECA---------------->82/11cm/s VERT-------------->43/11cm/s LEFT On Grayscale imaging there is mild atherosclerotic changes at the carotid bulb. PSV/EDV DIST CCA-------->94/23cm/s PROX ICA-------->78/21cm/s ECA---------------->124/14cm/s VERT-------------->38/16cm/s     No hemodynamically significant stenosis by NASCET criteria in either internal carotid artery. Antegrade vertebral artery blood flow bilaterally. Carotid Stenosis Reference Using SRU Criteria:   Mild - <50% stenosis. ICA PSV is less than 125 cm/second and plaque or intimal thickening is visible. Moderate - 50-69% stenosis. ICA PSV is 125 to 230 cm/second and plaque is visible. Severe - 70-94% stenosis. ICA PSV is more than 230 cm/second and visible plaque with lumen narrowing is seen. Near occlusion - 95-99% stenosis.  ICA PSV is variable and significant plaque with luminal narrowing is seen. Occluded - 100% stenosis. No flow identified. **This report has been created using voice recognition software. It may contain minor errors which are inherent in voice recognition technology. ** Final report electronically signed by Dr. Cordell Mosqueda on 7/27/2020 6:11 PM      Diet: DIET CARB CONTROL; Carb Control: 4 carb choices (60 gms)/meal    DVT prophylaxis: [x] Lovenox                                 [] SCDs                                 [] SQ Heparin                                 [] Encourage ambulation           [] Already on Anticoagulation       Code Status: Full Code      Active Hospital Problems    Diagnosis Date Noted    Syncope and collapse [R55]     Methamphetamine abuse ([facility] [address] [F15.10]     Abnormal thyroid function test [R94.6]     Polysubstance abuse ([address] [F19.10]     Hypothyroidism [E03.9]     Obesity (BMI 30-39. 9) [E66.9]     Moderate episode of recurrent major depressive disorder ([address] [F33.1]     Hyperglycemia [R73.9] 07/26/2020           With RN in room, patient was updated about the treatment plan, all the questions and concerns were addressed.         Electronically signed by Nelsy Hernandez MD on 7/29/2020 at 10:45 AM

## 2020-07-30 LAB
ANION GAP SERPL CALCULATED.3IONS-SCNC: 8 MEQ/L (ref 8–16)
BUN BLDV-MCNC: 11 MG/DL (ref 7–22)
CALCIUM SERPL-MCNC: 8.4 MG/DL (ref 8.5–10.5)
CHLORIDE BLD-SCNC: 103 MEQ/L (ref 98–111)
CO2: 26 MEQ/L (ref 23–33)
CREAT SERPL-MCNC: 0.5 MG/DL (ref 0.4–1.2)
GFR SERPL CREATININE-BSD FRML MDRD: > 90 ML/MIN/1.73M2
GLUCOSE BLD-MCNC: 101 MG/DL (ref 70–108)
GLUCOSE BLD-MCNC: 129 MG/DL (ref 70–108)
GLUCOSE BLD-MCNC: 167 MG/DL (ref 70–108)
GLUCOSE BLD-MCNC: 167 MG/DL (ref 70–108)
GLUCOSE BLD-MCNC: 197 MG/DL (ref 70–108)
GLUCOSE BLD-MCNC: 206 MG/DL (ref 70–108)
MAGNESIUM: 1.6 MG/DL (ref 1.6–2.4)
POTASSIUM SERPL-SCNC: 4 MEQ/L (ref 3.5–5.2)
SODIUM BLD-SCNC: 137 MEQ/L (ref 135–145)

## 2020-07-30 PROCEDURE — 97535 SELF CARE MNGMENT TRAINING: CPT

## 2020-07-30 PROCEDURE — 6370000000 HC RX 637 (ALT 250 FOR IP): Performed by: INTERNAL MEDICINE

## 2020-07-30 PROCEDURE — 82948 REAGENT STRIP/BLOOD GLUCOSE: CPT

## 2020-07-30 PROCEDURE — 97166 OT EVAL MOD COMPLEX 45 MIN: CPT

## 2020-07-30 PROCEDURE — 99226 PR SBSQ OBSERVATION CARE/DAY 35 MINUTES: CPT | Performed by: HOSPITALIST

## 2020-07-30 PROCEDURE — 96372 THER/PROPH/DIAG INJ SC/IM: CPT

## 2020-07-30 PROCEDURE — 6360000002 HC RX W HCPCS: Performed by: HOSPITALIST

## 2020-07-30 PROCEDURE — 80048 BASIC METABOLIC PNL TOTAL CA: CPT

## 2020-07-30 PROCEDURE — G0378 HOSPITAL OBSERVATION PER HR: HCPCS

## 2020-07-30 PROCEDURE — 97161 PT EVAL LOW COMPLEX 20 MIN: CPT

## 2020-07-30 PROCEDURE — 6370000000 HC RX 637 (ALT 250 FOR IP): Performed by: PHYSICIAN ASSISTANT

## 2020-07-30 PROCEDURE — 94760 N-INVAS EAR/PLS OXIMETRY 1: CPT

## 2020-07-30 PROCEDURE — 36415 COLL VENOUS BLD VENIPUNCTURE: CPT

## 2020-07-30 PROCEDURE — 97110 THERAPEUTIC EXERCISES: CPT

## 2020-07-30 PROCEDURE — 83735 ASSAY OF MAGNESIUM: CPT

## 2020-07-30 RX ADMIN — METOPROLOL TARTRATE 25 MG: 50 TABLET, FILM COATED ORAL at 08:48

## 2020-07-30 RX ADMIN — Medication 10.5 MG: at 21:55

## 2020-07-30 RX ADMIN — GABAPENTIN 300 MG: 300 CAPSULE ORAL at 08:48

## 2020-07-30 RX ADMIN — ACETAMINOPHEN 650 MG: 325 TABLET ORAL at 08:48

## 2020-07-30 RX ADMIN — PANTOPRAZOLE SODIUM 40 MG: 40 TABLET, DELAYED RELEASE ORAL at 05:42

## 2020-07-30 RX ADMIN — ASPIRIN 81 MG 81 MG: 81 TABLET ORAL at 08:48

## 2020-07-30 RX ADMIN — GABAPENTIN 300 MG: 300 CAPSULE ORAL at 14:34

## 2020-07-30 RX ADMIN — INSULIN GLARGINE 25 UNITS: 100 INJECTION, SOLUTION SUBCUTANEOUS at 08:50

## 2020-07-30 RX ADMIN — GABAPENTIN 300 MG: 300 CAPSULE ORAL at 21:21

## 2020-07-30 RX ADMIN — ENOXAPARIN SODIUM 40 MG: 40 INJECTION SUBCUTANEOUS at 08:48

## 2020-07-30 RX ADMIN — LEVOTHYROXINE SODIUM 50 MCG: 50 TABLET ORAL at 05:42

## 2020-07-30 RX ADMIN — INSULIN GLARGINE 25 UNITS: 100 INJECTION, SOLUTION SUBCUTANEOUS at 21:20

## 2020-07-30 RX ADMIN — MICONAZOLE NITRATE: 20 POWDER TOPICAL at 08:50

## 2020-07-30 RX ADMIN — ACETAMINOPHEN 650 MG: 325 TABLET ORAL at 14:34

## 2020-07-30 RX ADMIN — INSULIN LISPRO 1 UNITS: 100 INJECTION, SOLUTION INTRAVENOUS; SUBCUTANEOUS at 21:22

## 2020-07-30 RX ADMIN — MICONAZOLE NITRATE: 20 POWDER TOPICAL at 21:20

## 2020-07-30 RX ADMIN — OLANZAPINE 5 MG: 5 TABLET, FILM COATED ORAL at 21:21

## 2020-07-30 RX ADMIN — VENLAFAXINE HYDROCHLORIDE 75 MG: 75 CAPSULE, EXTENDED RELEASE ORAL at 08:48

## 2020-07-30 RX ADMIN — METOPROLOL TARTRATE 25 MG: 50 TABLET, FILM COATED ORAL at 21:21

## 2020-07-30 RX ADMIN — DIVALPROEX SODIUM 125 MG: 125 CAPSULE ORAL at 08:48

## 2020-07-30 RX ADMIN — ACETAMINOPHEN 650 MG: 325 TABLET ORAL at 22:10

## 2020-07-30 RX ADMIN — MIRTAZAPINE 15 MG: 15 TABLET, FILM COATED ORAL at 21:21

## 2020-07-30 ASSESSMENT — PAIN DESCRIPTION - ORIENTATION
ORIENTATION: LOWER
ORIENTATION: LOWER

## 2020-07-30 ASSESSMENT — PAIN DESCRIPTION - DESCRIPTORS
DESCRIPTORS: ACHING
DESCRIPTORS: ACHING

## 2020-07-30 ASSESSMENT — PAIN SCALES - GENERAL
PAINLEVEL_OUTOF10: 3
PAINLEVEL_OUTOF10: 4
PAINLEVEL_OUTOF10: 5
PAINLEVEL_OUTOF10: 4
PAINLEVEL_OUTOF10: 3
PAINLEVEL_OUTOF10: 3
PAINLEVEL_OUTOF10: 4
PAINLEVEL_OUTOF10: 6

## 2020-07-30 ASSESSMENT — PAIN DESCRIPTION - DIRECTION: RADIATING_TOWARDS: NECK

## 2020-07-30 ASSESSMENT — PAIN DESCRIPTION - FREQUENCY
FREQUENCY: CONTINUOUS
FREQUENCY: CONTINUOUS

## 2020-07-30 ASSESSMENT — PAIN DESCRIPTION - PAIN TYPE: TYPE: CHRONIC PAIN

## 2020-07-30 ASSESSMENT — PAIN DESCRIPTION - ONSET
ONSET: ON-GOING
ONSET: ON-GOING

## 2020-07-30 ASSESSMENT — PAIN DESCRIPTION - LOCATION
LOCATION: BACK
LOCATION: BACK

## 2020-07-30 ASSESSMENT — PAIN - FUNCTIONAL ASSESSMENT: PAIN_FUNCTIONAL_ASSESSMENT: ACTIVITIES ARE NOT PREVENTED

## 2020-07-30 ASSESSMENT — PAIN DESCRIPTION - PROGRESSION: CLINICAL_PROGRESSION: GRADUALLY IMPROVING

## 2020-07-30 NOTE — CARE COORDINATION
7/30/20, 2:13 PM EDT    DISCHARGE ON GOING EVALUATION    [facility] day: 2  Location: -25/025-A Reason for admit: Hyperglycemia [R73.9]  Syncope and collapse [R55]   Procedure: 07/28  Echo 0- Ef 60%  CT head negative  Treatment Plan of Care: boot to left foot/freq falls, PT/OT, (+) orthostatic vitals, dizzy with up, diabetic management, unsteady when up, chronic back pain, pain control, Miconazole powder to reddened groin/buttock areas, addiction services for resources. Barriers to Discharge: needs ECF now  PCP: WAGNER Stephens CNP  Readmission Risk Score: 31%  Patient Goals/Plan/Treatment Preferences: was planning home with BF; unable to go home deemed not safe by PT/OT. SW assisting with ECF placement in [facility] and[facility]or [facility]. To follow with CHF clinic.            multiple episodes over the last three days where she loses consciousness and she wakes up on the floor with no recollection of the time that has passed.   She is wearing a boot on the left foot from a fall 2/2 the vertigo and she broke her foot

## 2020-07-30 NOTE — FLOWSHEET NOTE
07/30/20 1144   Provider Notification   Reason for Communication Evaluate   Provider Name Dr. Pressley Prom   Provider Notification Physician   Method of Communication Secure Message   Notification Time [id number]   Updated doctor on positive orthostatic blood pressure and dizziness with standing. Also reported that patient expresses she feels she needs a nursing home on discharge as she does not have the support she would require at home and is unsteady with her injured foot. Doctor states he will place an order for social work. Also stated to order transfer to med/surg tele floor.

## 2020-07-30 NOTE — PROGRESS NOTES
Hospitalist Progress Note    Patient:  Merritt Cotton      Unit/Bed:4K-25/025-A    YOB: 1970    MRN: [id number]       Acct: [de-identified]     PCP: WAGNER Mccoy CNP    Date of Admission: 7/26/2020    Active Hospital Problems    Diagnosis Date Noted    Syncope and collapse [R55]     Methamphetamine abuse ([facility] [address] [F15.10]     Abnormal thyroid function test [R94.6]     Polysubstance abuse ([facility] [address] [F19.10]     Hypothyroidism [E03.9]     Obesity (BMI 30-39. 9) [E66.9]     Moderate episode of recurrent major depressive disorder ([facility] [address] [F33.1]     Hyperglycemia [R73.9] 07/26/2020       Assessment/Plan:  1. Recurrent syncopal episodes associated with CP: NYD etiology. Likely multifactorial. Polypharmacy? +/- dysautonomia +/- substance use. No arrhythmias on Tele. CT head unremarkable. CTA of chest unremarkable for PE. Carotid US CTA of neck. Last echo showed an EF of 50-55% Echocardiogram and otherwise non-contributory. No orthostasis? Cardiology following w/ planned stress test and TTE today. on Torsemide currently held due to question of polypharmacy. Repeat orthostatics in am. Metoprolol held due to stress test which will resume after procedure. D/c'ed NS at 125 cc/hr. PT/OT to see  7/29: Cardiac W/U non-contributory. Orthosis resolved. Pt still feels dizzy, PT/OT to see. Otherwise, will plan for BP med/diuretic regimen modification and 30-day Event monitor at discharge  7/30: remained stable, assessed by PT/OT and deemed unsafe for discharge home given physical deconditioning and L foot injury . 2. DM II w/ Hyperglycemia: Due to non-adherence to mediations. Poorly controlled w/ A1C 14.3. BS improved on home MDI; will consider optimizing MDI regimen; also arrange for diabetes education clinic  7/30: BS fairly well-controlled. Will lead further optimization of DM to PCP/diabetic clinic.  Pt and PCP to monitor BS and adjust regimen accordingly.       3. Relapsed methamphetamine abuse: Seen by lispro  0-6 Units Subcutaneous Nightly    miconazole   Topical BID    insulin glargine  25 Units Subcutaneous BID    melatonin  10.5 mg Oral Nightly    divalproex  125 mg Oral Daily    aspirin  81 mg Oral Daily    gabapentin  300 mg Oral TID    pantoprazole  40 mg Oral QAM AC    levothyroxine  50 mcg Oral QAM AC    [Held by provider] losartan  25 mg Oral QAM AC    metoprolol tartrate  25 mg Oral BID    mirtazapine  15 mg Oral Nightly    OLANZapine  5 mg Oral Nightly    [Held by provider] torsemide  20 mg Oral Daily    venlafaxine  75 mg Oral Daily     PRN Meds: glucose, dextrose, glucagon (rDNA), dextrose, acetaminophen, dextrose, potassium chloride, magnesium sulfate, sodium phosphate IVPB **OR** sodium phosphate IVPB **OR** sodium phosphate IVPB      Intake/Output Summary (Last 24 hours) at 7/30/2020 1151  Last data filed at 7/30/2020 0300  Gross per 24 hour   Intake 790 ml   Output 1300 ml   Net -510 ml       Diet:  DIET CARB CONTROL; Carb Control: 4 carb choices (60 gms)/meal    Exam:  BP (!) 101/55   Pulse 54   Temp 98.4 °F (36.9 °C) (Oral)   Resp 16   Ht 5' 5\" (1.651 m)   Wt 233 lb (105.7 kg)   SpO2 95%   BMI 38.77 kg/m²     General appearance: Obese, A&O x3, Not ill or toxic, in no apparent distress  HEENT:  ANDRE  EOM intact. Neck: Supple, with full range of motion. No jugular venous distention. Trachea midline. Respiratory:   NL A/E bilat with no adventitious sounds   Cardiovascular:  normal S1/S2 with no murmurs/gallops  Abdomen: Soft, non-tender, non-distended, no rigidity or peritoneal signs  Musculoskeletal: NL symmetrical A/PROM bilat U/L extremities   Skin: No rashes. No edema  Neurologic:  CN II-XII intact. NL symmetrical reflexes. NL gait and stance. NL Cerebellar exam. Power 5/5 all muscle groups U/L extremities.  Toes downgoing  Capillary Refill: Brisk,< 3 seconds   Peripheral Pulses: +2 palpable, equal bilaterally        Labs:   No results for input(s): WBC, HGB, HCT, PLT in the last 72 hours. Recent Labs     07/28/20  0600 07/29/20  0548 07/30/20  0628    137 137   K 3.8 4.1 4.0    104 103   CO2 24 26 26   BUN 7 7 11   CREATININE 0.6 0.6 0.5   CALCIUM 8.0* 8.7 8.4*     No results for input(s): AST, ALT, BILIDIR, BILITOT, ALKPHOS in the last 72 hours. No results for input(s): INR in the last 72 hours. No results for input(s): Lorrene Rom in the last 72 hours. Urinalysis:      Lab Results   Component Value Date    NITRU NEGATIVE 07/26/2020    BLOODU NEGATIVE 07/26/2020    GLUCOSEU >= 1000 07/26/2020       Radiology:  Ct Head Wo Contrast    Result Date: 7/26/2020  PROCEDURE: CT HEAD WO CONTRAST CLINICAL INFORMATION: fall, struck head. COMPARISON: None TECHNIQUE: Noncontrast 5 mm axial images were obtained through the brain. Sagittal and coronal reconstructions were obtained and reviewed. All CT scans at this facility use dose modulation, iterative reconstruction, and/or weight-based dosing when appropriate to reduce radiation dose to as low as reasonably achievable. FINDINGS: Brain: There is no acute ischemic infarct, hemorrhage, midline shift, mass, or mass effect. There is no focal abnormality of brain parenchymal attenuation. Ventricles/basal cisterns: The ventricles and cisterns are of appropriate size and configuration for the patient's age. No evidence of obstructive hydrocephalus. Skull base/calvarium/osseous structures: Unremarkable Soft tissues: Unremarkable Intraorbital contents: Unremarkable Sinuses: Unremarkable; the imaged sinuses are clear without evidence of mucosal thickening or fluid levels. Mastoids: Unremarkable; the mastoid air cells are clear. No acute ischemic infarct, hemorrhage, or mass effect. **This report has been created using voice recognition software. It may contain minor errors which are inherent in voice recognition technology. ** Final report electronically signed by Dr. Rachel Pelayo on 7/26/2020 10:01 PM    Cta Chest W Chris Valenzuela and plaque or intimal thickening is visible. Moderate - 50-69% stenosis. ICA PSV is 125 to 230 cm/second and plaque is visible. Severe - 70-94% stenosis. ICA PSV is more than 230 cm/second and visible plaque with lumen narrowing is seen. Near occlusion - 95-99% stenosis. ICA PSV is variable and significant plaque with luminal narrowing is seen. Occluded - 100% stenosis. No flow identified. **This report has been created using voice recognition software. It may contain minor errors which are inherent in voice recognition technology. ** Final report electronically signed by Dr. Amie Chacon on 7/27/2020 6:11 PM      Diet: DIET CARB CONTROL; Carb Control: 4 carb choices (60 gms)/meal    DVT prophylaxis: [x] Lovenox                                 [] SCDs                                 [] SQ Heparin                                 [] Encourage ambulation           [] Already on Anticoagulation       Code Status: Full Code      Active Hospital Problems    Diagnosis Date Noted    Syncope and collapse [R55]     Methamphetamine abuse ([facility] [address] [F15.10]     Abnormal thyroid function test [R94.6]     Polysubstance abuse ([address] [F19.10]     Hypothyroidism [E03.9]     Obesity (BMI 30-39. 9) [E66.9]     Moderate episode of recurrent major depressive disorder ([address] [F33.1]     Hyperglycemia [R73.9] 07/26/2020           With RN in room, patient was updated about the treatment plan, all the questions and concerns were addressed.         Electronically signed by Kathryn Ivory MD on 7/30/2020 at 11:51 AM

## 2020-07-30 NOTE — PROGRESS NOTES
[address] [facility] PHYSICAL THERAPY  EVALUATION  [facility] ICU STEPDOWN TELEMETRY 4K - 4K-25/025-A    Time In: 7757  Time Out: 1208  Timed Code Treatment Minutes: 8 Minutes  Minutes: 23          Date: 2020  Patient Name: Lyn Navarrete,  Gender:  female        MRN: [id number]  : 1970  (52 y.o.)      Referring Practitioner: Dr. Zainab Alexandra  Diagnosis: Hyperglycemia        Restrictions/Precautions:  Restrictions/Precautions: General Precautions, Fall Risk, Weight Bearing  Left Lower Extremity Weight Bearing: Non Weight Bearing  Required Braces or Orthoses  Left Lower Extremity Brace: Boot  Position Activity Restriction  Other position/activity restrictions: + orthostatics, hx of vertigo    Subjective:  Chart Reviewed: Yes  Patient assessed for rehabilitation services?: Yes  Subjective: Pt recently finished with OT but agreeable to therapy. Pt reports feeling dizzy throughout session with OT. Pt reports hx of vertigo but reports this feels different than prior episodes. Pt reported increased dizziness with returning to supine. General:  Overall Orientation Status: Within Normal Limits    Vision: Impaired  Vision Exceptions: Wears glasses for reading    Hearing: Within functional limits         Pain: 5/10: Headache and chronic back pain     Social/Functional History:    Lives With: Other (comment)  Type of Home: Mobile home  Home Layout: One level  Home Access: Stairs to enter without rails  Entrance Stairs - Number of Steps: 4  Home Equipment: Rolling walker, Cane, Crutches     Bathroom Shower/Tub: Tub/Shower unit  Bathroom Toilet: Standard  Bathroom Equipment: Grab bars in shower       ADL Assistance: [address]: Independent  Transfer Assistance: Independent          Additional Comments: Lives with roommate who is unable to help upon discharge. Pt is to be NWB to LLE in boot.   Reports difficulty maintaining NWB status therefore delayed poor healing. OBJECTIVE:  Range of Motion:  Right Lower Extremity: WFL  Left Lower Extremity: WFL, L ankle in boot    Strength:  Right Lower Extremity: WFL  Left Lower Extremity: Impaired - Grossly 3/5 throughout     Balance:  Static Sitting Balance:  Supervision  Static Standing Balance: Contact Guard Assistance with RW and cues to maintain NWB       Bed Mobility:  Supine to Sit: Stand By Assistance pt dizzy upon coming up to sit. Transfers:  Sit to Stand: Contact Guard Assistance  Stand to [facility], cues for hand placement and safety, impulsive with sitting     Ambulation:  Minimal Assistance, X 1, difficulty maintaining NWB on L LE   Distance: 4'x 1   Surface: Level Tile  Device:Rolling Walker  Gait Deviations: Forward Flexed Posture, Slow Lyly, Unsteady Gait and pt unable to maintain NWB. Pt may need education on w/c mobility and sit pivot t/fs to assist with healing of L LE       Exercise:  Patient was guided in 1 set(s) 10 reps of exercise to both lower extremities. Glut sets, Quad sets, Heelslides, Hip abduction/adduction, Straight leg raises, Seated marches and Long arc quads. Exercises were completed for increased independence with functional mobility. Functional Outcome Measures: Completed  -PAC Inpatient Mobility without Stair Climbing Raw Score : 15  -PAC Inpatient without Stair Climbing T-Scale Score : 43.03    ASSESSMENT:  Activity Tolerance:  Patient tolerance of  treatment: good. Limited treatment session at this time due to HA, increased dizziness during session and just finishing with OT eval.        Treatment Initiated: Treatment and education initiated within context of evaluation.   Evaluation time included review of current medical information, gathering information related to past medical, social and functional history, completion of standardized testing, formal and informal observation of tasks, assessment of data and development of plan of care and goals. Treatment time included skilled education and facilitation of tasks to increase safety and independence with functional mobility for improved independence and quality of life. Assessment: Body structures, Functions, Activity limitations: Decreased functional mobility , Decreased strength, Decreased balance, Decreased endurance  Assessment: Pt presents with above dx. Pt has difficulty maintaining NWB to nonhealing L ankle fx. Pt also presenting with increased dizziness. Pt may need vestibular eval.  Pt with decreased strength in L LE. Pt easily SOB. Pt to benefit from continued therapy to increase strength for functional mobility. Prognosis: Good    REQUIRES PT FOLLOW UP: Yes    Discharge Recommendations:  Discharge Recommendations: Continue to assess pending progress, Subacute/Skilled Nursing Facility    Patient Education:  PT Education: Goals, PT Role, Plan of Care    Equipment Recommendations:       Plan:  Times per week: 5x GM  Times per day: Daily  Current Treatment Recommendations: Strengthening, Balance Training, ROM, Functional Mobility Training, Transfer Training, Gait Training, Wheelchair Mobility Training, Home Exercise Program, Safety Education & Training    Goals:  Patient goals : To return home  Short term goals  Time Frame for Short term goals: At time of discharge  Short term goal 1: Mod I with bed mobility so pt can get in and out of bed  Short term goal 2: Mod I with t/fs so pt can get up to go to the bathroom  Short term goal 3: Mod I to amb with RW 25' x1 maintaining NWB on L LE for home amb  Short term goal 4: Pt to tolerate 15 reps B LE ther ex to improve strength for functional mobility  Long term goals  Time Frame for Long term goals : No LTGs secondary to ELOS    Following session, patient left in safe position with all fall risk precautions in place.

## 2020-07-30 NOTE — PROGRESS NOTES
[facility]  INPATIENT OCCUPATIONAL THERAPY  STR ICU STEPDOWN TELEMETRY 4K  EVALUATION    Time:   Time In: 1125  Time Out: 1141  Timed Code Treatment Minutes: 8 Minutes  Minutes: 16     AT THIS TIME, PT IS NOT SAFE TO RETURN HOME ALONE. PT DIZZY THROUGHOUT, WORSENING WITH MOBILITY AND PT UNABLE TO MAINTAIN NWB STATUS TO LLE. PT WITH SEVERE INCREASED FALL RISK SHOULD SHE RETURN HOME ALONE, ROOMMATE UNABLE TO ASSIST PT ON DISCHARGE. Date: 2020  Patient Name: Steven Vicente,   Gender: female      MRN: [id number]  : 1970  (52 y.o.)  Referring Practitioner: Paolo Sharpe MD  Diagnosis: Hyperglycemia  Additional Pertinent Hx: 52 y.o. female whopresents to the emergency department from home for recurrent falls. The patient has uncontrolled diabetes, CAD, and vertigo. She has a hx of a bypass. The patient has had multiple episodes over the last three days where she loses consciousness and she wakes up on the floor with no recollection of the time that has passed. She is wearing a boot on the left foot from a fall 2/2 the vertigo and she broke her foot.     Restrictions/Precautions:  Restrictions/Precautions: General Precautions, Fall Risk, Weight Bearing  Left Lower Extremity Weight Bearing: Non Weight Bearing  Required Braces or Orthoses  Left Lower Extremity Brace: Boot  Position Activity Restriction  Other position/activity restrictions: + orthostatics, hx of vertigo    Subjective  Chart Reviewed: Yes, Orders, Progress Notes, History and Physical  Patient assessed for rehabilitation services?: Yes  Family / Caregiver Present: No    Subjective: pleasant and cooperative, reports severe dizziness  Comments: RN ok'd OT, orthostatics taken, pt dizzy throughout, worse with mobility    Pain:  Pain Assessment  Patient Currently in Pain: Yes  Pain Assessment: 0-10  Pain Level: 4    Social/Functional History:  Lives With: Other (comment)  Type of Home: Mobile home  Home Layout: One level  Home Access: Stairs to enter without rails  Entrance Stairs - Number of Steps: 4  Home Equipment: Rolling walker, Cane, Crutches   Bathroom Shower/Tub: Tub/Shower unit  Bathroom Toilet: Standard  Bathroom Equipment: Grab bars in shower       ADL Assistance: [address]: Independent  Ambulation Assistance: Independent  Transfer Assistance: Independent          Additional Comments: Lives with roommate who is unable to help upon discharge. Pt is to be NWB to LLE in boot. Reports difficulty maintaining NWB status therefore delayed poor healing. VISION:Corrected    HEARING:  WFL    COGNITION: Decreased Safety Awareness    RANGE OF MOTION:  Bilateral Upper Extremity:  WNL    ADL:   Grooming: Minimal Assistance. for standing balance at sinkside for hand hygiene  Toileting: Minimal Assistance. for clothing management and pt balance  Toilet Transfer: Minimal Assistance. STS. BALANCE:  Sitting Balance:  Stand By Assistance. Standing Balance: Contact Guard Assistance, Minimal Assistance. fluctuates, poor compliance/tolerance to NWB    BED MOBILITY:  Supine to Sit: Contact Guard Assistance    Sit to Supine: Contact Guard Assistance    Scooting: Contact Guard Assistance      TRANSFERS:  Sit to Stand:  Minimal Assistance. for safety and improved WB status from EOB and STS  Stand to Sit: Minimal Assistance. to control descent    FUNCTIONAL MOBILITY:  Assistive Device: Rolling Walker  Assist Level:  Minimal Assistance. Distance: To and from bathroom  Poor tolerance to NWB status, demonstrating PWB/TTWB, increased dizziness with poor safety with mobility     Activity Tolerance:  Patient tolerance of  treatment: fair.  Orthostatics taken: Supine: 103/58 Sittin/55 Standin/53    Assessment:  Assessment: Pt would continue to benefit from skilled OT intervention to maximize pt safety and independence with performing self care tasks and functional mobility and to ensure safe transition to the next level of care and return to OF. Performance deficits / Impairments: Decreased functional mobility , Decreased ADL status, Decreased strength, Decreased safe awareness, Decreased high-level IADLs, Decreased balance, Decreased endurance  Prognosis: Good  REQUIRES OT FOLLOW UP: Yes    Treatment Initiated: Treatment and education initiated within context of evaluation. Evaluation time included review of current medical information, gathering information related to past medical, social and functional history, completion of standardized testing, formal and informal observation of tasks, assessment of data and development of plan of care and goals. Treatment time included skilled education and facilitation of tasks to increase safety and independence with ADL's for improved functional independence and quality of life. Discharge Recommendations:  [address], Patient would benefit from continued therapy after discharge, 24 hour supervision or assist(pt is not safe to return home at this time)    Patient Education:  OT Education: OT Role, Plan of Care, Precautions, ADL Adaptive Strategies, Transfer Training  Patient Education: discharge planning, safety, NWB status    Equipment Recommendations: Other: defer to next level of care, should pt discharge home, pt needs BSC and shower chair    Plan:  Times per week: 5x  Current Treatment Recommendations: Balance Training, Functional Mobility Training, Endurance Training, Safety Education & Training, Self-Care / ADL, Home Management Training, Patient/Caregiver Education & Training, Equipment Evaluation, Education, & procurement. See long-term goal time frame for expected duration of plan of care. If no long-term goals established, a short length of stay is anticipated. Goals:  Patient goals :  To go to SNF for rehab  Short term goals  Time Frame for Short term goals: By discharge  Short term goal 1: Pt will safely navigate, around environmental barriers, while maintaining NWB status with Mod I for inc indep and safety with toileting  Short term goal 2: Pt will demonstrate standing endurance >4 mins with 0 cues for NWB at Mod I in prep for sinkside grooming and SMP  Short term goal 3: Pt will complete BADL routine with min cues for ECT/safety/adaptive ADLs for inc independence with self care  Short term goal 4: Pt will tolerate 15 reps of BUE strengthening HEP for increasing endurance required for bathing tasks         Following session, patient left in safe position with all fall risk precautions in place.

## 2020-07-30 NOTE — DISCHARGE INSTR - DIET
 Good nutrition is important when healing from an illness, injury, or surgery. Follow any nutrition recommendations given to you during your hospital stay.  If you were given an oral nutrition supplement while in the hospital, continue to take this supplement at home. You can take it with meals, in-between meals, and/or before bedtime. These supplements can be purchased at most local grocery stores, pharmacies, and chain super-stores.  If you have any questions about your diet or nutrition, call the hospital and ask for the dietitian. You are being placed on a diabetic carb counting diet. Eating healthy is the first step in controlling diabetes    Here's how to get started. ... Eat 3 meals a day. Eat your meals at the same time each day and do not skip meals. Eat about the same amount of food each day. Limit sugar and sweets. Eat less candy, desserts, pastries and jelly. Limit intake of regular pop, sugary beverages and fruit juice. Drink sugar free beverages such as diet pop, water, Crystal Light, and unsweetened tea instead. Use Equal or Sweet-n-Low in place of sugar. Lose weight if you are overweight. Even a small amount of weight loss may help improve your blood sugar control. To help lose weight, reduce your portion sizes. Control your intake of carbohydrates. Carbohydrate is the main  nutrient that affects blood sugar levels. All the carbohydrate you eat is turned  into sugar by your body. Therefore, it is important to control  the amount  of carbohydrate that you eat a day. You should eat about 60-75  grams of  carbohydrate at each meal.      Common sources of carbohydrates:                       Eat more fiber. Fiber can help slow down the rise in blood sugar following a meal.  To get more fiber in your diet, eat at least 5 servings of fruits and vegetables a day, choose whole grain bread/cereal and eat more beans or legumes.      Reduce your intake of high fat foods. Cutting back on your intake of high fat food can help reduce body weight and cholesterol levels. Reduce intake of fried food, rosas, sausage, luncheon meat, gravy, sour cream, cheese, egg yolks and margarine/butter. Limit your intake of alcohol. Drink alcohol only with permission of your doctor. Never drink alcohol on an empty stomach. Be more active. Regular exercise is an important part of your diabetes care as exercise can help lower your blood sugar levels. The type and amount of exercise that is right for you should be discussed with your doctor.

## 2020-07-30 NOTE — DISCHARGE SUMMARY
Hospital Medicine Discharge Summary      Patient Identification:   Yao Akers   : 1970  MRN: [id number]   Account: [de-identified]      Patient's PCP: WAGNER Lin CNP    Admit Date: 2020     Discharge Date:   2020      Admitting Physician: Little Lala MD     Discharge Physician: Nahomi Hester MD     Discharge Diagnoses: Active Hospital Problems    Diagnosis Date Noted    Syncope and collapse [R55]     Methamphetamine abuse (HCC) [F15.10]     Abnormal thyroid function test [R94.6]     Polysubstance abuse ([address] [F19.10]     Hypothyroidism [E03.9]     Obesity (BMI 30-39. 9) [E66.9]     Moderate episode of recurrent major depressive disorder ([address] [F33.1]     Hyperglycemia [R73.9] 2020       The patient was seen and examined on day of discharge and this discharge summary is in conjunction with any daily progress note from day of discharge. Hospital Course:   Yao Akers is a 52 y.o. female admitted to [address] [place] [address] on 2020 for  evaluation of syncope. Cardiology following. I took over care on . Pt responded well to medical management, remained clinically stable and was discharged in stable conditions after cleared by consulting services. Assessment/Plan:    1. Recurrent syncopal episodes associated with CP: NYD etiology. Likely multifactorial. Polypharmacy? +/- dysautonomia +/- substance use. No arrhythmias on Tele. CT head unremarkable. CTA of chest unremarkable for PE. Carotid US CTA of neck. Last echo showed an EF of 50-55% Echocardiogram and otherwise non-contributory. No orthostasis? Cardiology following w/ planned stress test and TTE today. on Torsemide currently held due to question of polypharmacy. Repeat orthostatics in am. Metoprolol held due to stress test which will resume after procedure. D/c'ed NS at 125 cc/hr. PT/OT to see  : Cardiac W/U non-contributory. Orthosis resolved.  Pt still feels dizzy, PT/OT to see. Otherwise, will plan for BP med/diuretic regimen modification and 30-day Event monitor at discharge  7/30: remained stable, cleared by PT/OT for discharge home safely. Plan of care as discussed above. Remained off ARB and diuretics at discharge  2. DM II w/ Hyperglycemia: Due to non-adherence to mediations. Poorly controlled w/ A1C 14.3. BS improved on home MDI; will consider optimizing MDI regimen; also arrange for diabetes education clinic  7/30: BS fairly well-controlled. Will lead further optimization of DM to PCP/diabetic clinic. Pt and PCP to monitor BS and adjust regimen accordingly.       3. Relapsed methamphetamine abuse: Seen by Addiction services and OP resources offered   4. Hypokalemia: Resolved w/ replacement. Repeat BMP in one wk    5. Chronic hepatitis C: not on treatment; Will need GI outpatient F/U for consideration of antiviral therapy  6. History of polysubstance abuse: denies cocaine use; on BB; will cousel about risks associated w/ Cocaine use  7. Hypothyroidism: on Levothyroxine; TSH marginally elevated; PCP to monitor and consider dose adjustment  8. Hx of HTN: BP well-controlled on current in-hospital regimen which continued at discharge; given#1 did not resume ARB at [address]; PCP to possibly consider resuming for nephropathy protection  9. GERD: on PPI  10. Hx of depression: Continue home meds  11. Obersity w/ BMI 38.3  : Counseling offered  12.  PT/OT followed; pt deemed clear for discharge      Exam:     Vitals:  Vitals:    07/29/20 2015 07/29/20 2330 07/30/20 0300 07/30/20 0841   BP: 117/60 106/66 (!) 106/51 136/73   Pulse: 59 59 55 66   Resp: 16 16 16 14   Temp: 97.4 °F (36.3 °C) 97.2 °F (36.2 °C) 97.1 °F (36.2 °C) 97.8 °F (36.6 °C)   TempSrc: Oral Oral Oral Oral   SpO2: 95%  94% 96%   Weight:       Height:         Weight: Weight: 233 lb (105.7 kg)     24 hour intake/output:    Intake/Output Summary (Last 24 hours) at 7/30/2020 1047  Last data filed at 7/30/2020 0300  Gross per 24 hour   Intake 790 ml   Output 1300 ml   Net -510 ml           General appearance: Obese, A&O x3, Not ill or toxic, in no apparent distress  HEENT:  ADNRE  EOM intact. Neck: Supple, with full range of motion. No jugular venous distention. Trachea midline. Respiratory:   NL A/E bilat with no adventitious sounds   Cardiovascular:  normal S1/S2 with no murmurs/gallops  Abdomen: Soft, non-tender, non-distended, no rigidity or peritoneal signs  Musculoskeletal: NL symmetrical A/PROM bilat U/L extremities   Skin: No rashes. No edema  Neurologic:  CN II-XII intact. NL symmetrical reflexes. NL gait and stance. NL Cerebellar exam. Power 5/5 all muscle groups U/L extremities. Toes downgoing  Capillary Refill: Brisk,< 3 seconds   Peripheral Pulses: +2 palpable, equal bilaterally      Labs: For convenience and continuity at follow-up the following most recent labs are provided:      CBC:    Lab Results   Component Value Date    WBC 7.9 07/27/2020    HGB 12.9 07/27/2020    HCT 38.7 07/27/2020     07/27/2020       Renal:    Lab Results   Component Value Date     07/30/2020    K 4.0 07/30/2020    K 3.9 07/26/2020     07/30/2020    CO2 26 07/30/2020    BUN 11 07/30/2020    CREATININE 0.5 07/30/2020    CALCIUM 8.4 07/30/2020         Significant Diagnostic Studies    Radiology:   VL DUP CAROTID BILATERAL   Final Result      No hemodynamically significant stenosis by NASCET criteria in either internal carotid artery. Antegrade vertebral artery blood flow bilaterally. Carotid Stenosis Reference Using SRU Criteria:        Mild - <50% stenosis. ICA PSV is less than 125 cm/second and plaque or intimal thickening is visible. Moderate - 50-69% stenosis. ICA PSV is 125 to 230 cm/second and plaque is visible. Severe - 70-94% stenosis. ICA PSV is more than 230 cm/second and visible plaque with lumen narrowing is seen. Near occlusion - 95-99% stenosis.  ICA PSV is variable and significant plaque with luminal narrowing is seen. Occluded - 100% stenosis. No flow identified. **This report has been created using voice recognition software. It may contain minor errors which are inherent in voice recognition technology. **      Final report electronically signed by Dr. Gee Walker on 7/27/2020 6:11 PM      CT HEAD WO CONTRAST   Final Result      No acute ischemic infarct, hemorrhage, or mass effect. **This report has been created using voice recognition software. It may contain minor errors which are inherent in voice recognition technology. **      Final report electronically signed by Dr. Matilde Christensen on 7/26/2020 10:01 PM      CTA CHEST [address]   Final Result      No acute pulmonary embolism. No acute cardiopulmonary disease. Status post CABG surgery. Subcentimeter calcified gallstone in the gallbladder. 2 to 3 mm nonobstructing right renal calculus. **This report has been created using voice recognition software. It may contain minor errors which are inherent in voice recognition technology. **      Final report electronically signed by Dr. Matlide Christensen on 7/26/2020 10:07 PM      XR CHEST PORTABLE   Final Result   There is no acute intrathoracic process. **This report has been created using voice recognition software. It may contain minor errors which are inherent in voice recognition technology. **      Final report electronically signed by Dr Tiara Graham on 7/26/2020 8:14 PM             Consults:     IP CONSULT TO HEART FAILURE NURSE/COORDINATOR  IP CONSULT TO PSYCHIATRY  IP CONSULT TO ADDICTION SERVICES  IP CONSULT TO CARDIOLOGY    Disposition:    [x] Home       [] TCU       [] Rehab       [] Psych       [] SNF       [] [place]       [] Other-    Condition at Discharge: Stable    Code Status:  Full Code     Patient Instructions:    Discharge lab work: CBC, BMP in one week  Activity: activity as tolerated  Diet: DIET CARB CONTROL; Carb Control: 4 carb choices (60 gms)/meal      Follow-up visits:   [address] [facility] CHF clinic Aug 13 @ 130pm 2nd floor hospital 2[address]  [phone]      staff-please s/u w/i 7d         Discharge Medications: Rudi, [facility] Medication Instructions EYE:[id number]    Printed on:07/30/20 1060   Medication Information                      acetaminophen (TYLENOL) 325 MG tablet  Take 650 mg by mouth every 4 hours as needed             aspirin 81 MG chewable tablet  Take 81 mg by mouth daily             atomoxetine (STRATTERA) 25 MG capsule  Take 25 mg by mouth daily             atorvastatin (LIPITOR) 20 MG tablet  Take 1 tablet by mouth nightly             divalproex (DEPAKOTE) 125 MG DR tablet  Take 1 tablet by mouth daily              gabapentin (NEURONTIN) 300 MG capsule  Take 300 mg by mouth 3 times daily.              insulin glargine (BASAGLAR KWIKPEN) 100 UNIT/ML injection pen  Inject 25 Units into the skin 2 times daily              insulin lispro (ADMELOG) 100 UNIT/ML injection vial  Inject 15 Units into the skin 3 times daily (before meals) \"per sliding scale\" according to patient             lansoprazole (PREVACID) 15 MG delayed release capsule  Take 15 mg by mouth every morning (before breakfast)             levothyroxine (SYNTHROID) 50 MCG tablet  Take 50 mcg by mouth every morning (before breakfast)             meclizine (ANTIVERT) 12.5 MG tablet  Take 12.5 mg by mouth every morning (before breakfast)             Melatonin 10 MG TABS  Take 10 mg by mouth nightly as needed (sleep)             metoprolol tartrate (LOPRESSOR) 25 MG tablet  Take 1 tablet by mouth 2 times daily             minocycline (MINOCIN;DYNACIN) 100 MG capsule  Take 1 capsule by mouth 2 times daily             mirtazapine (REMERON) 15 MG tablet  Take 1 tablet by mouth

## 2020-07-31 ENCOUNTER — APPOINTMENT (OUTPATIENT)
Dept: GENERAL RADIOLOGY | Age: 50
End: 2020-07-31
Payer: MEDICAID

## 2020-07-31 LAB
ANION GAP SERPL CALCULATED.3IONS-SCNC: 6 MEQ/L (ref 8–16)
BUN BLDV-MCNC: 12 MG/DL (ref 7–22)
CALCIUM IONIZED: 1.12 MMOL/L (ref 1.12–1.32)
CALCIUM SERPL-MCNC: 8.3 MG/DL (ref 8.5–10.5)
CHLORIDE BLD-SCNC: 107 MEQ/L (ref 98–111)
CO2: 28 MEQ/L (ref 23–33)
CREAT SERPL-MCNC: 0.7 MG/DL (ref 0.4–1.2)
GFR SERPL CREATININE-BSD FRML MDRD: 89 ML/MIN/1.73M2
GLUCOSE BLD-MCNC: 124 MG/DL (ref 70–108)
GLUCOSE BLD-MCNC: 192 MG/DL (ref 70–108)
GLUCOSE BLD-MCNC: 207 MG/DL (ref 70–108)
GLUCOSE BLD-MCNC: 210 MG/DL (ref 70–108)
GLUCOSE BLD-MCNC: 87 MG/DL (ref 70–108)
HCV QNT BY NAAT INTERPRETATION: DETECTED
HCV QNT BY NAAT IU/ML: ABNORMAL
HCV QNT BY NAAT LOG IU/ML: 7.25 LOG IU/ML
MAGNESIUM: 1.5 MG/DL (ref 1.6–2.4)
POTASSIUM SERPL-SCNC: 3.9 MEQ/L (ref 3.5–5.2)
SODIUM BLD-SCNC: 141 MEQ/L (ref 135–145)

## 2020-07-31 PROCEDURE — 97530 THERAPEUTIC ACTIVITIES: CPT

## 2020-07-31 PROCEDURE — 82948 REAGENT STRIP/BLOOD GLUCOSE: CPT

## 2020-07-31 PROCEDURE — 36415 COLL VENOUS BLD VENIPUNCTURE: CPT

## 2020-07-31 PROCEDURE — G0378 HOSPITAL OBSERVATION PER HR: HCPCS

## 2020-07-31 PROCEDURE — 6360000002 HC RX W HCPCS: Performed by: HOSPITALIST

## 2020-07-31 PROCEDURE — 73610 X-RAY EXAM OF ANKLE: CPT

## 2020-07-31 PROCEDURE — 6360000002 HC RX W HCPCS: Performed by: PHYSICIAN ASSISTANT

## 2020-07-31 PROCEDURE — 96372 THER/PROPH/DIAG INJ SC/IM: CPT

## 2020-07-31 PROCEDURE — 96366 THER/PROPH/DIAG IV INF ADDON: CPT

## 2020-07-31 PROCEDURE — 80048 BASIC METABOLIC PNL TOTAL CA: CPT

## 2020-07-31 PROCEDURE — 83735 ASSAY OF MAGNESIUM: CPT

## 2020-07-31 PROCEDURE — 82330 ASSAY OF CALCIUM: CPT

## 2020-07-31 PROCEDURE — 99226 PR SBSQ OBSERVATION CARE/DAY 35 MINUTES: CPT | Performed by: HOSPITALIST

## 2020-07-31 PROCEDURE — 6370000000 HC RX 637 (ALT 250 FOR IP): Performed by: INTERNAL MEDICINE

## 2020-07-31 PROCEDURE — 6370000000 HC RX 637 (ALT 250 FOR IP): Performed by: PHYSICIAN ASSISTANT

## 2020-07-31 PROCEDURE — 94760 N-INVAS EAR/PLS OXIMETRY 1: CPT

## 2020-07-31 RX ADMIN — ACETAMINOPHEN 650 MG: 325 TABLET ORAL at 10:35

## 2020-07-31 RX ADMIN — MICONAZOLE NITRATE: 20 POWDER TOPICAL at 08:37

## 2020-07-31 RX ADMIN — GABAPENTIN 300 MG: 300 CAPSULE ORAL at 13:21

## 2020-07-31 RX ADMIN — LEVOTHYROXINE SODIUM 50 MCG: 50 TABLET ORAL at 06:26

## 2020-07-31 RX ADMIN — INSULIN LISPRO 1 UNITS: 100 INJECTION, SOLUTION INTRAVENOUS; SUBCUTANEOUS at 21:11

## 2020-07-31 RX ADMIN — GABAPENTIN 300 MG: 300 CAPSULE ORAL at 21:10

## 2020-07-31 RX ADMIN — DIVALPROEX SODIUM 125 MG: 125 CAPSULE ORAL at 08:25

## 2020-07-31 RX ADMIN — ACETAMINOPHEN 650 MG: 325 TABLET ORAL at 22:59

## 2020-07-31 RX ADMIN — INSULIN GLARGINE 25 UNITS: 100 INJECTION, SOLUTION SUBCUTANEOUS at 08:27

## 2020-07-31 RX ADMIN — ENOXAPARIN SODIUM 40 MG: 40 INJECTION SUBCUTANEOUS at 08:26

## 2020-07-31 RX ADMIN — MIRTAZAPINE 15 MG: 15 TABLET, FILM COATED ORAL at 21:10

## 2020-07-31 RX ADMIN — OLANZAPINE 5 MG: 5 TABLET, FILM COATED ORAL at 21:10

## 2020-07-31 RX ADMIN — Medication 10.5 MG: at 21:11

## 2020-07-31 RX ADMIN — MICONAZOLE NITRATE: 20 POWDER TOPICAL at 21:10

## 2020-07-31 RX ADMIN — METOPROLOL TARTRATE 25 MG: 50 TABLET, FILM COATED ORAL at 08:26

## 2020-07-31 RX ADMIN — GABAPENTIN 300 MG: 300 CAPSULE ORAL at 08:26

## 2020-07-31 RX ADMIN — PANTOPRAZOLE SODIUM 40 MG: 40 TABLET, DELAYED RELEASE ORAL at 06:26

## 2020-07-31 RX ADMIN — ASPIRIN 81 MG 81 MG: 81 TABLET ORAL at 08:26

## 2020-07-31 RX ADMIN — MAGNESIUM SULFATE HEPTAHYDRATE 2 G: 40 INJECTION, SOLUTION INTRAVENOUS at 08:47

## 2020-07-31 RX ADMIN — VENLAFAXINE HYDROCHLORIDE 75 MG: 75 CAPSULE, EXTENDED RELEASE ORAL at 08:26

## 2020-07-31 RX ADMIN — INSULIN GLARGINE 25 UNITS: 100 INJECTION, SOLUTION SUBCUTANEOUS at 21:11

## 2020-07-31 ASSESSMENT — PAIN DESCRIPTION - DIRECTION: RADIATING_TOWARDS: NECK

## 2020-07-31 ASSESSMENT — PAIN DESCRIPTION - ONSET
ONSET: ON-GOING
ONSET: ON-GOING

## 2020-07-31 ASSESSMENT — PAIN SCALES - GENERAL
PAINLEVEL_OUTOF10: 5
PAINLEVEL_OUTOF10: 7
PAINLEVEL_OUTOF10: 5
PAINLEVEL_OUTOF10: 6
PAINLEVEL_OUTOF10: 0

## 2020-07-31 ASSESSMENT — PAIN DESCRIPTION - PAIN TYPE
TYPE: CHRONIC PAIN

## 2020-07-31 ASSESSMENT — PAIN DESCRIPTION - PROGRESSION
CLINICAL_PROGRESSION: NOT CHANGED

## 2020-07-31 ASSESSMENT — PAIN DESCRIPTION - ORIENTATION
ORIENTATION: LEFT;LOWER
ORIENTATION: LOWER

## 2020-07-31 ASSESSMENT — PAIN DESCRIPTION - FREQUENCY
FREQUENCY: CONTINUOUS
FREQUENCY: CONTINUOUS

## 2020-07-31 ASSESSMENT — PAIN DESCRIPTION - DESCRIPTORS
DESCRIPTORS: ACHING
DESCRIPTORS: ACHING

## 2020-07-31 ASSESSMENT — PAIN DESCRIPTION - LOCATION
LOCATION: BACK;ANKLE
LOCATION: BACK
LOCATION: BACK;ANKLE

## 2020-07-31 NOTE — PLAN OF CARE
Problem: Musculor/Skeletal Functional Status  Goal: Highest potential functional level  Outcome: Ongoing  Note: Patient able to pivot to commode, non weightbearing left leg due to fall prehospitalization    Goal: Absence of falls  Outcome: Ongoing  Note: No falls this shift.  Bed in lowest position, call light and items within reach, bed alarm on

## 2020-07-31 NOTE — PROGRESS NOTES
[facility] ICU STEPDOWN TELEMETRY 4K  Occupational Therapy  Daily Note  Time:   Time In: 0048  Time Out: 1221  Timed Code Treatment Minutes: 24 Minutes  Minutes: 24          Date: 2020  Patient Name: Zamzam Ching,   Gender: female      Room: -25/025-A  MRN: [id number]  : 1970  (52 y.o.)  Referring Practitioner: Kristan Holliday MD  Diagnosis: Hyperglycemia  Additional Pertinent Hx: 52 y.o. female whopresents to the emergency department from home for recurrent falls. The patient has uncontrolled diabetes, CAD, and vertigo. She has a hx of a bypass. The patient has had multiple episodes over the last three days where she loses consciousness and she wakes up on the floor with no recollection of the time that has passed. She is wearing a boot on the left foot from a fall 2/2 the vertigo and she broke her foot. Restrictions/Precautions:  Restrictions/Precautions: General Precautions, Fall Risk, Weight Bearing  Left Lower Extremity Weight Bearing: Non Weight Bearing  Required Braces or Orthoses  Left Lower Extremity Brace: Boot  Position Activity Restriction  Other position/activity restrictions: + orthostatics, hx of vertigo    SUBJECTIVE: Patient supine in bed upon arrival.  RN Ok'ed treatment. Education on NWB and what that looks like and technique with walker with short gait with feet inside walker for increaed safety with hopping. PAIN: no number pain in ankle and back  ADL:   Grooming: Stand By Assistance, X 1 and with set-up. Patient washing face and hands. BALANCE:  Sitting Balance:  Stand By Assistance. Standing Balance: Contact Guard Assistance. Patient completed static standing at edge for recliner for improved standing endurance. Patient did maintain NWB. Patient stood approx 1 min 30 seconds. BED MOBILITY:  Supine to Sit: Stand By Assistance   To edge of bed.      TRANSFERS:  Sit to Stand:  Minimal Assistance from bed with vc for technique and imagery of flamino with no weight bearing for improved safety with NWB on LLE. Stand to Sit: Minimal Assistance. Patient was given  verbal cues for not plopping back into chair    FUNCTIONAL MOBILITY:  Assistive Device: Rolling Walker  Assist Level:  Contact Guard Assistance, X 1, with set-up and with verbal cues . Distance: Patient completed hopping iwth RW with verbal cus for technqiue for safety. Patient reported being dizzy with hopping. Patient was given education on sitting whenever she feels dizzy due to increased fall risk with dizzy. Education was given on safety  with sitting and not pushing self to prevent falls  Lunch tray came into room so treatment was terminated. ASSESSMENT:     Activity Tolerance:  Patient tolerance of  treatment: good. Discharge Recommendations: [address], Patient would benefit from continued therapy after discharge, 24 hour supervision or assist(pt is not safe to return home at this time)  Equipment Recommendations:  Other: defer to next level of care, should pt discharge home, pt needs BSC and shower chair  Plan: Times per week: 5x  Current Treatment Recommendations: Balance Training, Functional Mobility Training, Endurance Training, Safety Education & Training, Self-Care / ADL, Home Management Training, Patient/Caregiver Education & Training, Equipment Evaluation, Education, & procurement    Patient Education  Patient Education: Plan of Care, Precautions and Assistive Device Safety    Goals  Short term goals  Time Frame for Short term goals: By discharge  Short term goal 1: Pt will safely navigate, around environmental barriers, while maintaining NWB status with Mod I for inc indep and safety with toileting  Short term goal 2: Pt will demonstrate standing endurance >4 mins with 0 cues for NWB at Mod I in prep for sinkside grooming and SMP  Short term goal 3: Pt will complete BADL routine with min cues for ECT/safety/adaptive ADLs

## 2020-07-31 NOTE — PROGRESS NOTES
Hospitalist Progress Note    Patient:  Abraham Jones      Unit/Bed:4K-25/025-A    YOB: 1970    MRN: [id number]       Acct: [de-identified]     PCP: WAGNER Gagnon CNP    Date of Admission: 7/26/2020    Active Hospital Problems    Diagnosis Date Noted    Syncope and collapse [R55]     Methamphetamine abuse ([facility] [address] [F15.10]     Abnormal thyroid function test [R94.6]     Polysubstance abuse ([facility] [address] [F19.10]     Hypothyroidism [E03.9]     Obesity (BMI 30-39. 9) [E66.9]     Moderate episode of recurrent major depressive disorder ([facility] [address] [F33.1]     Hyperglycemia [R73.9] 07/26/2020       Assessment/Plan:  1. Recurrent syncopal episodes associated with CP: NYD etiology. Likely multifactorial. Polypharmacy? +/- dysautonomia +/- substance use. No arrhythmias on Tele. CT head unremarkable. CTA of chest unremarkable for PE. Carotid US CTA of neck. Last echo showed an EF of 50-55% Echocardiogram and otherwise non-contributory. No orthostasis? Cardiology following w/ planned stress test and TTE today. on Torsemide currently held due to question of polypharmacy. Repeat orthostatics in am. Metoprolol held due to stress test which will resume after procedure. D/c'ed NS at 125 cc/hr. PT/OT to see  7/29: Cardiac W/U non-contributory. Orthosis resolved. Pt still feels dizzy, PT/OT to see. Otherwise, will plan for BP med/diuretic regimen modification and 30-day Event monitor at discharge  7/31: remained stable, assessed by PT/OT and deemed unsafe for discharge home given physical deconditioning and L ankle fracture   . 2. DM II w/ Hyperglycemia: Due to non-adherence to mediations. Poorly controlled w/ A1C 14.3. BS improved on home MDI; will consider optimizing MDI regimen; also arrange for diabetes education clinic  7/30: BS fairly well-controlled. Will lead further optimization of DM to PCP/diabetic clinic. Pt and PCP to monitor BS and adjust regimen accordingly. 7/31: fairly well-controlled. [facility]       3. Relapsed methamphetamine abuse: Seen by Addiction services and OP resources offered   4. Hypokalemia: Resolved w/ replacement. 5. Chronic hepatitis C: not on treatment; Will need GI outpatient F/U for consideration of antiviral therapy  6. History of polysubstance abuse: denies cocaine use; on BB; will cousel about risks associated w/ Cocaine use  7. Hypothyroidism: on Levothyroxine; TSH marginally elevated; PCP to monitor and consider dose adjustment  8. Hx of HTN: BP well-controlled on current in-hospital regimen which continued at discharge; given#1 did not resume ARB at [address]; PCP to possibly consider resuming for nephropathy protection  9. GERD: on PPI  10. Hx of depression: Continue home meds  11. Obersity w/ BMI 38.3  : Counseling offered  12. Minimally-displaced Velasquez A L distal fibular#: treated in long leg airboot cast, WBAT/AAT  13. PT/OT followed; pt deemed unsafe for discharge and recommended ECF; pt also wishes to go to [facility]  7/31: declined SNF; SW/ on board. Also consulted PM&R for possible rehab transfer        Expected discharge date:  TBD (delayed d/t concerns for pt's safety at home alone given ongoing dizziness, discharge later prolonged d/t difficulty w/ placement, transfer orders in already)     Disposition:      [] Home                             [] TCU                             [] Rehab                             [] Psych                             [] SNF                             [] [facility]                             [] Other- TBD    Chief Complaint:   Chief Complaint   Patient presents with    Fall   Karen Ford Vaginitis       Hospital Course: Patient was seen, examined and the medical chart was reviewed thoroughly today. In summary, 52 y. o.female admitted on 7/26/2020 for evaluation of syncope. Cardiology following. I took over care on 7/28.       Subjective (past 24 hours):   overall feels improved; still feels dizzy w/ ambulation; denies CP/SOB/focal neuro deficit/N/V/ fever/chills/palpitation/presyncope      Medications:  Reviewed    Infusion Medications    dextrose       Scheduled Medications    enoxaparin  40 mg Subcutaneous Daily    magnesium sulfate  2 g Intravenous Once    insulin lispro  0-12 Units Subcutaneous TID WC    insulin lispro  0-6 Units Subcutaneous Nightly    miconazole   Topical BID    insulin glargine  25 Units Subcutaneous BID    melatonin  10.5 mg Oral Nightly    divalproex  125 mg Oral Daily    aspirin  81 mg Oral Daily    gabapentin  300 mg Oral TID    pantoprazole  40 mg Oral QAM AC    levothyroxine  50 mcg Oral QAM AC    [Held by provider] losartan  25 mg Oral QAM AC    metoprolol tartrate  25 mg Oral BID    mirtazapine  15 mg Oral Nightly    OLANZapine  5 mg Oral Nightly    [Held by provider] torsemide  20 mg Oral Daily    venlafaxine  75 mg Oral Daily     PRN Meds: glucose, dextrose, glucagon (rDNA), dextrose, acetaminophen, dextrose, potassium chloride, magnesium sulfate, sodium phosphate IVPB **OR** sodium phosphate IVPB **OR** sodium phosphate IVPB      Intake/Output Summary (Last 24 hours) at 7/31/2020 0706  Last data filed at 7/31/2020 0456  Gross per 24 hour   Intake 770 ml   Output 2650 ml   Net -1880 ml       Diet:  DIET CARB CONTROL; Carb Control: 4 carb choices (60 gms)/meal    Exam:  /70   Pulse 52   Temp 97.3 °F (36.3 °C) (Oral)   Resp 12   Ht 5' 5\" (1.651 m)   Wt 229 lb 8 oz (104.1 kg)   SpO2 95%   BMI 38.19 kg/m²     General appearance: Obese, A&O x3, Not ill or toxic, in no apparent distress  HEENT:  ANDRE  EOM intact. Neck: Supple, with full range of motion. No jugular venous distention. Trachea midline. Respiratory:   NL A/E bilat with no adventitious sounds   Cardiovascular:  normal S1/S2 with no murmurs/gallops  Abdomen: Soft, non-tender, non-distended, no rigidity or peritoneal signs  Musculoskeletal: NL symmetrical A/PROM bilat U/L extremities   Skin: No rashes.  No edema  Neurologic:  CN II-XII intact. NL symmetrical reflexes. NL gait and stance. NL Cerebellar exam. Power 5/5 all muscle groups U/L extremities. Toes downgoing  Capillary Refill: Brisk,< 3 seconds   Peripheral Pulses: +2 palpable, equal bilaterally        Labs:   No results for input(s): WBC, HGB, HCT, PLT in the last 72 hours. Recent Labs     07/29/20  0548 07/30/20  0628 07/31/20  0532    137 141   K 4.1 4.0 3.9    103 107   CO2 26 26 28   BUN 7 11 12   CREATININE 0.6 0.5 0.7   CALCIUM 8.7 8.4* 8.3*     No results for input(s): AST, ALT, BILIDIR, BILITOT, ALKPHOS in the last 72 hours. No results for input(s): INR in the last 72 hours. No results for input(s): Ari Lust in the last 72 hours. Urinalysis:      Lab Results   Component Value Date    NITRU NEGATIVE 07/26/2020    BLOODU NEGATIVE 07/26/2020    GLUCOSEU >= 1000 07/26/2020       Radiology:  Ct Head Wo Contrast    Result Date: 7/26/2020  PROCEDURE: CT HEAD WO CONTRAST CLINICAL INFORMATION: fall, struck head. COMPARISON: None TECHNIQUE: Noncontrast 5 mm axial images were obtained through the brain. Sagittal and coronal reconstructions were obtained and reviewed. All CT scans at this facility use dose modulation, iterative reconstruction, and/or weight-based dosing when appropriate to reduce radiation dose to as low as reasonably achievable. FINDINGS: Brain: There is no acute ischemic infarct, hemorrhage, midline shift, mass, or mass effect. There is no focal abnormality of brain parenchymal attenuation. Ventricles/basal cisterns: The ventricles and cisterns are of appropriate size and configuration for the patient's age. No evidence of obstructive hydrocephalus. Skull base/calvarium/osseous structures: Unremarkable Soft tissues: Unremarkable Intraorbital contents: Unremarkable Sinuses: Unremarkable; the imaged sinuses are clear without evidence of mucosal thickening or fluid levels.  Mastoids: Unremarkable; the mastoid air cells are clear. No acute ischemic infarct, hemorrhage, or mass effect. **This report has been created using voice recognition software. It may contain minor errors which are inherent in voice recognition technology. ** Final report electronically signed by Dr. Omer Gillette on 7/26/2020 10:01 PM    Cta Chest W Wo Contrast    Result Date: 7/26/2020  PROCEDURE: CTA CHEST W WO CONTRAST CLINICAL INFORMATION: LOC, dizziness. . COMPARISON: Chest x-ray earlier today TECHNIQUE: 3 mm thick by 1.5 mm interval axial images were obtained through the chest after the administration of IV contrast.  A non-contrast localizer was obtained. Sagittal and coronal MIP 3D reconstructions were performed on the scanner. 80 mL Isovue-370 were administered intravenously. All CT scans at this facility use dose modulation, iterative reconstruction, and/or weight-based dosing when appropriate to reduce radiation dose to as low as reasonably achievable. FINDINGS: Lungs: There is no pneumonia or mass. There is very mild bilateral upper and lower lobe dependent atelectasis. The trachea and central bronchi are unremarkable. Pleura: There is no pleural effusion. There is no pneumothorax. Heart: Heart size is normal. The patient is status post sternotomy for CABG surgery. There is no pericardial effusion. Pulmonary vasculature: There is adequate opacification of the pulmonary arteries. There is no pulmonary embolism. Afua and mediastinum: There is no hilar or mediastinal mass or adenopathy. Thoracic aorta/vascular: There is no thoracic aortic aneurysm or dissection. The imaged brachiocephalic arteries are unremarkable. Imaged upper abdomen: There is a subcentimeter calcified gallstone in the gallbladder. There is no biliary dilatation. There are a few hepatic calcified granulomas. There is a 2 to 3 mm nonobstructing upper/mid right renal calculus.  Musculoskeletal system: Unremarkable Chest/body wall soft tissues: Unremarkable Thyroid: Unremarkable No acute pulmonary embolism. No acute cardiopulmonary disease. Status post CABG surgery. Subcentimeter calcified gallstone in the gallbladder. 2 to 3 mm nonobstructing right renal calculus. **This report has been created using voice recognition software. It may contain minor errors which are inherent in voice recognition technology. ** Final report electronically signed by Dr. Adams Novak on 7/26/2020 10:07 PM    Xr Chest Portable    Result Date: 7/26/2020  PROCEDURE: XR CHEST PORTABLE CLINICAL INFORMATION: 59-year-old female with dizziness. COMPARISON: Chest x-ray 6/23/2020. TECHNIQUE: AP upright view of the chest was obtained. FINDINGS: Metallic wire sutures are in the sternum There are low lung volumes which accentuate the pulmonary vasculature. The cardiac silhouette is normal in size. There is no significant pleural effusion or pneumothorax. Visualized portions of the upper abdomen are within normal limits. The osseous structures are intact. No acute fractures or suspicious osseous lesions. There is no acute intrathoracic process. **This report has been created using voice recognition software. It may contain minor errors which are inherent in voice recognition technology. ** Final report electronically signed by Dr Danny Hollis on 7/26/2020 8:14 PM    Vl Dup Carotid Bilateral    Result Date: 7/27/2020  PROCEDURE: VL DUP CAROTID BILATERAL CLINICAL INFORMATION: syncopal episodes. COMPARISON: No prior study. TECHNIQUE:B-mode, spectral Doppler waveform analysis, and color-flow ultrasound of the carotid and vertebral arteries was performed bilaterally. FINDINGS: RIGHT On grayscale images there is mild arthritic changes the carotid bulb. PSV/EDV DIST CCA-------->91/17cm/s PROX ICA-------->81/21cm/s ECA---------------->82/11cm/s VERT-------------->43/11cm/s LEFT On Grayscale imaging there is mild atherosclerotic changes at the carotid bulb.  PSV/EDV DIST CCA-------->94/23cm/s PROX ICA-------->78/21cm/s ECA---------------->124/14cm/s VERT-------------->38/16cm/s     No hemodynamically significant stenosis by NASCET criteria in either internal carotid artery. Antegrade vertebral artery blood flow bilaterally. Carotid Stenosis Reference Using SRU Criteria:   Mild - <50% stenosis. ICA PSV is less than 125 cm/second and plaque or intimal thickening is visible. Moderate - 50-69% stenosis. ICA PSV is 125 to 230 cm/second and plaque is visible. Severe - 70-94% stenosis. ICA PSV is more than 230 cm/second and visible plaque with lumen narrowing is seen. Near occlusion - 95-99% stenosis. ICA PSV is variable and significant plaque with luminal narrowing is seen. Occluded - 100% stenosis. No flow identified. **This report has been created using voice recognition software. It may contain minor errors which are inherent in voice recognition technology. ** Final report electronically signed by Dr. Jaci Burciaga on 7/27/2020 6:11 PM      Diet: DIET CARB CONTROL; Carb Control: 4 carb choices (60 gms)/meal    DVT prophylaxis: [x] Lovenox                                 [] SCDs                                 [] SQ Heparin                                 [] Encourage ambulation           [] Already on Anticoagulation       Code Status: Full Code      Active Hospital Problems    Diagnosis Date Noted    Syncope and collapse [R55]     Methamphetamine abuse ([facility] [address] [F15.10]     Abnormal thyroid function test [R94.6]     Polysubstance abuse ([facility] [address] [F19.10]     Hypothyroidism [E03.9]     Obesity (BMI 30-39. 9) [E66.9]     Moderate episode of recurrent major depressive disorder ([facility] [address] [F33.1]     Hyperglycemia [R73.9] 07/26/2020           With RN in room, patient was updated about the treatment plan, all the questions and concerns were addressed.         Electronically signed by Diana Pittman MD on 7/31/2020 at 7:06 AM

## 2020-07-31 NOTE — CARE COORDINATION
7/31/20, 8:42 AM EDT    DISCHARGE PLANNING EVALUATION      SW received a message from Yamile De La Rosa with ECF. This patient is a corporate denial for them.   Discussed in morning rounds, and in patient rehab may be an option

## 2020-07-31 NOTE — CARE COORDINATION
7/31/20, 8:44 AM EDT    DISCHARGE ON GOING EVALUATION    Ashlee LUI [facility] day: 2  Location: -25/025-A Reason for admit: Hyperglycemia [R73.9]  Syncope and collapse [R55]   Procedure:   07/28  Echo 0- Ef 60%  CT head negative  Treatment Plan of Care: continue PT/OT, no syncopal episodes overnight, boot to left ankle, on PPI. Barriers to Discharge: not safe go home; precert to be started  PCP: WAGNER Braun CNP  Readmission Risk Score: 31%  Patient Goals/Plan/Treatment Preferences: IP rehab referral, PMR ordered. 1333 am- notified Alethea MOLINA re: IP rehab; patient will be precert and she will have NO bed today.

## 2020-07-31 NOTE — PROGRESS NOTES
Rehab consult received. Chart reviewed. Await physiatry recommendations. Patient is a precert. Will monitor.

## 2020-08-01 LAB
ANION GAP SERPL CALCULATED.3IONS-SCNC: 8 MEQ/L (ref 8–16)
BUN BLDV-MCNC: 15 MG/DL (ref 7–22)
CALCIUM SERPL-MCNC: 8.5 MG/DL (ref 8.5–10.5)
CHLORIDE BLD-SCNC: 101 MEQ/L (ref 98–111)
CO2: 26 MEQ/L (ref 23–33)
CREAT SERPL-MCNC: 0.6 MG/DL (ref 0.4–1.2)
GFR SERPL CREATININE-BSD FRML MDRD: > 90 ML/MIN/1.73M2
GLUCOSE BLD-MCNC: 139 MG/DL (ref 70–108)
GLUCOSE BLD-MCNC: 150 MG/DL (ref 70–108)
GLUCOSE BLD-MCNC: 174 MG/DL (ref 70–108)
GLUCOSE BLD-MCNC: 175 MG/DL (ref 70–108)
GLUCOSE BLD-MCNC: 203 MG/DL (ref 70–108)
MAGNESIUM: 1.7 MG/DL (ref 1.6–2.4)
POTASSIUM SERPL-SCNC: 4.1 MEQ/L (ref 3.5–5.2)
SODIUM BLD-SCNC: 135 MEQ/L (ref 135–145)

## 2020-08-01 PROCEDURE — 6360000002 HC RX W HCPCS: Performed by: HOSPITALIST

## 2020-08-01 PROCEDURE — 82948 REAGENT STRIP/BLOOD GLUCOSE: CPT

## 2020-08-01 PROCEDURE — 83735 ASSAY OF MAGNESIUM: CPT

## 2020-08-01 PROCEDURE — 80048 BASIC METABOLIC PNL TOTAL CA: CPT

## 2020-08-01 PROCEDURE — 6370000000 HC RX 637 (ALT 250 FOR IP): Performed by: PHYSICIAN ASSISTANT

## 2020-08-01 PROCEDURE — 94760 N-INVAS EAR/PLS OXIMETRY 1: CPT

## 2020-08-01 PROCEDURE — 6370000000 HC RX 637 (ALT 250 FOR IP): Performed by: INTERNAL MEDICINE

## 2020-08-01 PROCEDURE — 96372 THER/PROPH/DIAG INJ SC/IM: CPT

## 2020-08-01 PROCEDURE — 36415 COLL VENOUS BLD VENIPUNCTURE: CPT

## 2020-08-01 PROCEDURE — G0378 HOSPITAL OBSERVATION PER HR: HCPCS

## 2020-08-01 PROCEDURE — 99226 PR SBSQ OBSERVATION CARE/DAY 35 MINUTES: CPT | Performed by: HOSPITALIST

## 2020-08-01 RX ADMIN — INSULIN GLARGINE 25 UNITS: 100 INJECTION, SOLUTION SUBCUTANEOUS at 08:29

## 2020-08-01 RX ADMIN — METOPROLOL TARTRATE 25 MG: 50 TABLET, FILM COATED ORAL at 20:37

## 2020-08-01 RX ADMIN — OLANZAPINE 5 MG: 5 TABLET, FILM COATED ORAL at 20:37

## 2020-08-01 RX ADMIN — ASPIRIN 81 MG 81 MG: 81 TABLET ORAL at 08:30

## 2020-08-01 RX ADMIN — ENOXAPARIN SODIUM 40 MG: 40 INJECTION SUBCUTANEOUS at 08:30

## 2020-08-01 RX ADMIN — GABAPENTIN 300 MG: 300 CAPSULE ORAL at 13:51

## 2020-08-01 RX ADMIN — DIVALPROEX SODIUM 125 MG: 125 CAPSULE ORAL at 08:29

## 2020-08-01 RX ADMIN — GABAPENTIN 300 MG: 300 CAPSULE ORAL at 08:29

## 2020-08-01 RX ADMIN — MICONAZOLE NITRATE: 20 POWDER TOPICAL at 20:38

## 2020-08-01 RX ADMIN — LEVOTHYROXINE SODIUM 50 MCG: 50 TABLET ORAL at 05:49

## 2020-08-01 RX ADMIN — MIRTAZAPINE 15 MG: 15 TABLET, FILM COATED ORAL at 20:37

## 2020-08-01 RX ADMIN — INSULIN GLARGINE 25 UNITS: 100 INJECTION, SOLUTION SUBCUTANEOUS at 20:38

## 2020-08-01 RX ADMIN — GABAPENTIN 300 MG: 300 CAPSULE ORAL at 20:38

## 2020-08-01 RX ADMIN — MICONAZOLE NITRATE: 20 POWDER TOPICAL at 08:32

## 2020-08-01 RX ADMIN — Medication 10.5 MG: at 20:38

## 2020-08-01 RX ADMIN — PANTOPRAZOLE SODIUM 40 MG: 40 TABLET, DELAYED RELEASE ORAL at 05:49

## 2020-08-01 RX ADMIN — VENLAFAXINE HYDROCHLORIDE 75 MG: 75 CAPSULE, EXTENDED RELEASE ORAL at 08:29

## 2020-08-01 RX ADMIN — INSULIN LISPRO 1 UNITS: 100 INJECTION, SOLUTION INTRAVENOUS; SUBCUTANEOUS at 20:38

## 2020-08-01 ASSESSMENT — PAIN SCALES - GENERAL
PAINLEVEL_OUTOF10: 0

## 2020-08-01 NOTE — PLAN OF CARE
Problem: Musculor/Skeletal Functional Status  Goal: Highest potential functional level  8/1/2020 1304 by Dimitri Wolf RN  Outcome: Ongoing    Problem: Musculor/Skeletal Functional Status  Goal: Absence of falls  8/1/2020 1304 by Dimitri Wolf RN  Outcome: Ongoing  Call light within reach. Side rails up x2. Bed alarm on. Non skid slippers available. Problem: Discharge Planning:  Goal: Discharged to appropriate level of care  Description: Discharged to appropriate level of care  8/1/2020 1304 by Dimitri Wolf RN  Outcome: Ongoing  Working to get the patient into inpatient rehab. Dr Rajani Walker to see. Problem: Pain:  Goal: Pain level will decrease  Description: Pain level will decrease  8/1/2020 1304 by Dimitri Wolf RN  Outcome: Ongoing  Patient free from pain this shift. Pain rated on 0-10 pain rating scale. Will continue to reassess. Problem: Serum Glucose Level - Abnormal:  Goal: Ability to maintain appropriate glucose levels will improve to within specified parameters  Description: Ability to maintain appropriate glucose levels will improve to within specified parameters  8/1/2020 1304 by Dimitri Wolf RN  Outcome: Ongoing  Chem AC/HS, insulin given per sliding scale      Care plan reviewed with patient. Patient verbalize understanding of the plan of care and contribute to goal setting.

## 2020-08-01 NOTE — PROGRESS NOTES
CCM       3. Relapsed methamphetamine abuse: Seen by Addiction services and OP resources offered   4. Hypokalemia: Resolved w/ replacement. 5. Chronic hepatitis C: not on treatment; Will need GI outpatient F/U for consideration of antiviral therapy  6. History of polysubstance abuse: denies cocaine use; on BB; will cousel about risks associated w/ Cocaine use  7. Hypothyroidism: on Levothyroxine; TSH marginally elevated; PCP to monitor and consider dose adjustment  8. Hx of HTN: BP well-controlled on current in-hospital regimen which continued at discharge; given#1 did not resume ARB at [address]; PCP to possibly consider resuming for nephropathy protection  9. GERD: on PPI  10. Hx of depression: Continue home meds  11. Obersity w/ BMI 38.3  : Counseling offered  12. Minimally-displaced Velasquez A L distal fibular#: treated in long leg airboot cast, WBAT/AAT  13. PT/OT followed; pt deemed unsafe for discharge and recommended ECF; pt also [facility] to [facility]  7/31: declined SNF; SW/ on board. Also consulted PM&R for possible rehab transfer  8/1: awaiting placement        Expected discharge date:  TBD (delayed d/t concerns for pt's safety at home alone given ongoing dizziness, discharge later prolonged d/t difficulty w/ placement, transfer orders in already)     Disposition:      [] Home                             [] TCU                             [] Rehab                             [] Psych                             [] SNF                             [] [place]                             [] Other- TBD    Chief Complaint:   Chief Complaint   Patient presents with    [facility] Vaginitis       Hospital Course: Patient was seen, examined and the medical chart was reviewed thoroughly today. In summary, 52 y. o.female admitted on 7/26/2020 for evaluation of syncope. Cardiology following. I took over care on 7/28.       Subjective (past 24 hours):  still feels dizzy w/ ambulation although improved; denies CP/SOB/focal neuro deficit/N/V/ fever/chills/palpitation/presyncope      Medications:  Reviewed    Infusion Medications    dextrose       Scheduled Medications    enoxaparin  40 mg Subcutaneous Daily    insulin lispro  0-12 Units Subcutaneous TID WC    insulin lispro  0-6 Units Subcutaneous Nightly    miconazole   Topical BID    insulin glargine  25 Units Subcutaneous BID    melatonin  10.5 mg Oral Nightly    divalproex  125 mg Oral Daily    aspirin  81 mg Oral Daily    gabapentin  300 mg Oral TID    pantoprazole  40 mg Oral QAM AC    levothyroxine  50 mcg Oral QAM AC    [Held by provider] losartan  25 mg Oral QAM AC    metoprolol tartrate  25 mg Oral BID    mirtazapine  15 mg Oral Nightly    OLANZapine  5 mg Oral Nightly    [Held by provider] torsemide  20 mg Oral Daily    venlafaxine  75 mg Oral Daily     PRN Meds: glucose, dextrose, glucagon (rDNA), dextrose, acetaminophen, dextrose, potassium chloride, magnesium sulfate, sodium phosphate IVPB **OR** sodium phosphate IVPB **OR** sodium phosphate IVPB      Intake/Output Summary (Last 24 hours) at 8/1/2020 1407  Last data filed at 8/1/2020 0810  Gross per 24 hour   Intake 1210 ml   Output 600 ml   Net 610 ml       Diet:  DIET CARB CONTROL; Carb Control: 4 carb choices (60 gms)/meal    Exam:  BP (!) 144/77   Pulse 66   Temp 98.1 °F (36.7 °C) (Oral)   Resp 18   Ht 5' 5\" (1.651 m)   Wt 239 lb 6.4 oz (108.6 kg)   SpO2 96%   BMI 39.84 kg/m²     General appearance: Obese, A&O x3, Not ill or toxic, in no apparent distress  HEENT:  ANDRE  EOM intact. Neck: Supple, with full range of motion. No jugular venous distention. Trachea midline. Respiratory:   NL A/E bilat with no adventitious sounds   Cardiovascular:  normal S1/S2 with no murmurs/gallops  Abdomen: Soft, non-tender, non-distended, no rigidity or peritoneal signs  Musculoskeletal: NL symmetrical A/PROM bilat U/L extremities   Skin: No rashes.  No edema  Neurologic:  CN II-XII intact. NL symmetrical reflexes. NL gait and stance. NL Cerebellar exam. Power 5/5 all muscle groups U/L extremities. Toes downgoing  Capillary Refill: Brisk,< 3 seconds   Peripheral Pulses: +2 palpable, equal bilaterally        Labs:   No results for input(s): WBC, HGB, HCT, PLT in the last 72 hours. Recent Labs     07/30/20  0628 07/31/20  0532 08/01/20  0625    141 135   K 4.0 3.9 4.1    107 101   CO2 26 28 26   BUN 11 12 15   CREATININE 0.5 0.7 0.6   CALCIUM 8.4* 8.3* 8.5     No results for input(s): AST, ALT, BILIDIR, BILITOT, ALKPHOS in the last 72 hours. No results for input(s): INR in the last 72 hours. No results for input(s): Cody Hodgkins in the last 72 hours. Urinalysis:      Lab Results   Component Value Date    NITRU NEGATIVE 07/26/2020    BLOODU NEGATIVE 07/26/2020    GLUCOSEU >= 1000 07/26/2020       Radiology:  Ct Head Wo Contrast    Result Date: 7/26/2020  PROCEDURE: CT HEAD WO CONTRAST CLINICAL INFORMATION: fall, struck head. COMPARISON: None TECHNIQUE: Noncontrast 5 mm axial images were obtained through the brain. Sagittal and coronal reconstructions were obtained and reviewed. All CT scans at this facility use dose modulation, iterative reconstruction, and/or weight-based dosing when appropriate to reduce radiation dose to as low as reasonably achievable. FINDINGS: Brain: There is no acute ischemic infarct, hemorrhage, midline shift, mass, or mass effect. There is no focal abnormality of brain parenchymal attenuation. Ventricles/basal cisterns: The ventricles and cisterns are of appropriate size and configuration for the patient's age. No evidence of obstructive hydrocephalus. Skull base/calvarium/osseous structures: Unremarkable Soft tissues: Unremarkable Intraorbital contents: Unremarkable Sinuses: Unremarkable; the imaged sinuses are clear without evidence of mucosal thickening or fluid levels. Mastoids: Unremarkable; the mastoid air cells are clear.      No acute ischemic infarct, hemorrhage, or mass effect. **This report has been created using voice recognition software. It may contain minor errors which are inherent in voice recognition technology. ** Final report electronically signed by Dr. Bree Hinojosa on 7/26/2020 10:01 PM    Cta Chest W Wo Contrast    Result Date: 7/26/2020  PROCEDURE: CTA CHEST W WO CONTRAST CLINICAL INFORMATION: LOC, dizziness. . COMPARISON: Chest x-ray earlier today TECHNIQUE: 3 mm thick by 1.5 mm interval axial images were obtained through the chest after the administration of IV contrast.  A non-contrast localizer was obtained. Sagittal and coronal MIP 3D reconstructions were performed on the scanner. 80 mL Isovue-370 were administered intravenously. All CT scans at this facility use dose modulation, iterative reconstruction, and/or weight-based dosing when appropriate to reduce radiation dose to as low as reasonably achievable. FINDINGS: Lungs: There is no pneumonia or mass. There is very mild bilateral upper and lower lobe dependent atelectasis. The trachea and central bronchi are unremarkable. Pleura: There is no pleural effusion. There is no pneumothorax. Heart: Heart size is normal. The patient is status post sternotomy for CABG surgery. There is no pericardial effusion. Pulmonary vasculature: There is adequate opacification of the pulmonary arteries. There is no pulmonary embolism. Afua and mediastinum: There is no hilar or mediastinal mass or adenopathy. Thoracic aorta/vascular: There is no thoracic aortic aneurysm or dissection. The imaged brachiocephalic arteries are unremarkable. Imaged upper abdomen: There is a subcentimeter calcified gallstone in the gallbladder. There is no biliary dilatation. There are a few hepatic calcified granulomas. There is a 2 to 3 mm nonobstructing upper/mid right renal calculus.  Musculoskeletal system: Unremarkable Chest/body wall soft tissues: Unremarkable Thyroid: Unremarkable     No acute pulmonary ECA---------------->124/14cm/s VERT-------------->38/16cm/s     No hemodynamically significant stenosis by NASCET criteria in either internal carotid artery. Antegrade vertebral artery blood flow bilaterally. Carotid Stenosis Reference Using SRU Criteria:   Mild - <50% stenosis. ICA PSV is less than 125 cm/second and plaque or intimal thickening is visible. Moderate - 50-69% stenosis. ICA PSV is 125 to 230 cm/second and plaque is visible. Severe - 70-94% stenosis. ICA PSV is more than 230 cm/second and visible plaque with lumen narrowing is seen. Near occlusion - 95-99% stenosis. ICA PSV is variable and significant plaque with luminal narrowing is seen. Occluded - 100% stenosis. No flow identified. **This report has been created using voice recognition software. It may contain minor errors which are inherent in voice recognition technology. ** Final report electronically signed by Dr. Vanessa Barreto on 7/27/2020 6:11 PM      Diet: DIET CARB CONTROL; Carb Control: 4 carb choices (60 gms)/meal    DVT prophylaxis: [x] Lovenox                                 [] SCDs                                 [] SQ Heparin                                 [] Encourage ambulation           [] Already on Anticoagulation       Code Status: Full Code      Active Hospital Problems    Diagnosis Date Noted    Syncope and collapse [R55]     Methamphetamine abuse ([facility] [address] [F15.10]     Abnormal thyroid function test [R94.6]     Polysubstance abuse ([facility] [address] [F19.10]     Hypothyroidism [E03.9]     Obesity (BMI 30-39. 9) [E66.9]     Moderate episode of recurrent major depressive disorder ([facility] [address] [F33.1]     Hyperglycemia [R73.9] 07/26/2020           With RN in room, patient was updated about the treatment plan, all the questions and concerns were addressed.         Electronically signed by Nahomi Hester MD on 8/1/2020 at 2:07 PM

## 2020-08-01 NOTE — PLAN OF CARE
Problem: Falls - Risk of:  Goal: Will remain free from falls  Outcome: Ongoing  Note: The patient has been free of falls this shift. Bed is in low position, 2/4 rails are up, and alarm is active. Call light is in reach, 2/4 rails are up, and hourly rounding performed. Goal: Absence of physical injury  Outcome: Ongoing     Problem: Musculor/Skeletal Functional Status  Goal: Highest potential functional level  Outcome: Ongoing  Goal: Absence of falls  Outcome: Ongoing     Problem: Discharge Planning:  Goal: Participates in care planning  Outcome: Ongoing  Note: Discharge is pending at this time. INP rehab consult in placed and waiting to see. Goal: Discharged to appropriate level of care  Outcome: Ongoing     Problem: Activity Intolerance:  Goal: Ability to tolerate increased activity will improve  Outcome: Ongoing  Note: The patient is up with one person assist with walker and NWB of the left foot. Problem: Pain:  Goal: Pain level will decrease  Outcome: Ongoing  Note: The patient did have some c/o pain this shift. Tylenol given as ordered. Goal: Ability to notify healthcare provider of pain before it becomes unmanageable or unbearable will improve  Outcome: Ongoing  Goal: Control of acute pain  Outcome: Ongoing  Goal: Control of chronic pain  Outcome: Ongoing     Problem: Serum Glucose Level - Abnormal:  Goal: Ability to maintain appropriate glucose levels will improve to within specified parameters  Outcome: Ongoing  Note: Chem was 192. Insulin given by the previous nurse prior to transfer. Problem: Skin Integrity - Impaired:  Goal: Will show no infection signs and symptoms  Outcome: Ongoing  Note: No new skin breakdown this shift. Goal: Absence of new skin breakdown  Outcome: Ongoing   Care plan reviewed with patient. The patient verbalizes understanding and contributed to plan of care.

## 2020-08-01 NOTE — FLOWSHEET NOTE
[facility] [place] [address] PROGRESS NOTE      Patient: Lyn Navarrete  Room #: 4Z-67/077-S            YOB: 1970  Age: 52 y.o. Gender: female            Admit Date & Time: 2020  7:04 PM    Assessment:  Chavez Blair is a 53 yo female who per chart and Aicha RN ( Patient primary) has a history of substance abuse. She was clean for a while but recently relapsed and has been falling with no recolleciton of the falls. She shared that her mother  about 4 years ago . Chavez Blair had been her caregiver for a while, but it go to be too much for her, so her sister took over and that is when their mom . Chavez Blair is experiencing guilt that she was not able to continue to provide care for her mother and feels she was the reason her mom  bc the sister's care was inadequate. She did find some comfort in knowing her mother was saved before she . Interventions:  Discussed caregiver guilt and how it lingers. Gave patient permission to forgive herself and let her know her mother was grateful for the care she was able to provide. Discussed the reassurance of knowing her mother was saved and that she helped that happen by caring for her. Maico;ked about how this kind of guilt can lead to substance abuse to suppress the pain and forgiving herself can help with her ongoing recovery . Introduced [facility] and how it can be a helpful part of her recovery. Outcomes:  Patient became tearful when  gave her permission to forgive herself. She expressed appreciation for 's visit and open to continued SC visits. Plan:    1. Will note on green sheet for additional support.      Electronically signed by Annetta Pena on 2020 at 9:14 PM.  [address]  [phone]               20   Encounter Summary   Services provided to: Patient   Referral/Consult From: [facility] Family members   Continue Visiting Yes  (7/31)   Complexity of Encounter Moderate   Length of Encounter 30 minutes   Spiritual Assessment Completed Yes   Spiritual/Yazdanism   Type Spiritual struggle   Assessment Approachable;Guilt;Unresolved grief;Questioning meaning/purpose;Helplessness; Hopeless; Tearful   Intervention Active listening;Explored feelings, thoughts, concerns;Explored coping resources;Nurtured hope;Discussed illness/injury and it's impact;Sustaining presence/ Ministry of presence   Outcome Comfort;Expressed gratitude;Engaged in conversation;Expressed feelings/needs/concerns; Tearful;Receptive

## 2020-08-02 LAB
ANION GAP SERPL CALCULATED.3IONS-SCNC: 9 MEQ/L (ref 8–16)
BUN BLDV-MCNC: 13 MG/DL (ref 7–22)
CALCIUM SERPL-MCNC: 8.8 MG/DL (ref 8.5–10.5)
CHLORIDE BLD-SCNC: 105 MEQ/L (ref 98–111)
CO2: 25 MEQ/L (ref 23–33)
CREAT SERPL-MCNC: 0.6 MG/DL (ref 0.4–1.2)
GFR SERPL CREATININE-BSD FRML MDRD: > 90 ML/MIN/1.73M2
GLUCOSE BLD-MCNC: 115 MG/DL (ref 70–108)
GLUCOSE BLD-MCNC: 133 MG/DL (ref 70–108)
GLUCOSE BLD-MCNC: 170 MG/DL (ref 70–108)
GLUCOSE BLD-MCNC: 177 MG/DL (ref 70–108)
GLUCOSE BLD-MCNC: 233 MG/DL (ref 70–108)
MAGNESIUM: 1.6 MG/DL (ref 1.6–2.4)
POTASSIUM SERPL-SCNC: 4.3 MEQ/L (ref 3.5–5.2)
SODIUM BLD-SCNC: 139 MEQ/L (ref 135–145)

## 2020-08-02 PROCEDURE — 99226 PR SBSQ OBSERVATION CARE/DAY 35 MINUTES: CPT | Performed by: HOSPITALIST

## 2020-08-02 PROCEDURE — 80048 BASIC METABOLIC PNL TOTAL CA: CPT

## 2020-08-02 PROCEDURE — 83735 ASSAY OF MAGNESIUM: CPT

## 2020-08-02 PROCEDURE — 96372 THER/PROPH/DIAG INJ SC/IM: CPT

## 2020-08-02 PROCEDURE — 36415 COLL VENOUS BLD VENIPUNCTURE: CPT

## 2020-08-02 PROCEDURE — 2500000003 HC RX 250 WO HCPCS: Performed by: INTERNAL MEDICINE

## 2020-08-02 PROCEDURE — 6360000002 HC RX W HCPCS: Performed by: HOSPITALIST

## 2020-08-02 PROCEDURE — 82948 REAGENT STRIP/BLOOD GLUCOSE: CPT

## 2020-08-02 PROCEDURE — 6370000000 HC RX 637 (ALT 250 FOR IP): Performed by: INTERNAL MEDICINE

## 2020-08-02 PROCEDURE — 6370000000 HC RX 637 (ALT 250 FOR IP): Performed by: PHYSICIAN ASSISTANT

## 2020-08-02 PROCEDURE — G0378 HOSPITAL OBSERVATION PER HR: HCPCS

## 2020-08-02 RX ADMIN — OLANZAPINE 5 MG: 5 TABLET, FILM COATED ORAL at 20:33

## 2020-08-02 RX ADMIN — PANTOPRAZOLE SODIUM 40 MG: 40 TABLET, DELAYED RELEASE ORAL at 05:48

## 2020-08-02 RX ADMIN — VENLAFAXINE HYDROCHLORIDE 75 MG: 75 CAPSULE, EXTENDED RELEASE ORAL at 08:28

## 2020-08-02 RX ADMIN — ACETAMINOPHEN 650 MG: 325 TABLET ORAL at 11:32

## 2020-08-02 RX ADMIN — MIRTAZAPINE 15 MG: 15 TABLET, FILM COATED ORAL at 20:33

## 2020-08-02 RX ADMIN — INSULIN GLARGINE 25 UNITS: 100 INJECTION, SOLUTION SUBCUTANEOUS at 08:29

## 2020-08-02 RX ADMIN — GABAPENTIN 300 MG: 300 CAPSULE ORAL at 08:29

## 2020-08-02 RX ADMIN — METOPROLOL TARTRATE 25 MG: 50 TABLET, FILM COATED ORAL at 20:34

## 2020-08-02 RX ADMIN — INSULIN GLARGINE 25 UNITS: 100 INJECTION, SOLUTION SUBCUTANEOUS at 20:40

## 2020-08-02 RX ADMIN — DIVALPROEX SODIUM 125 MG: 125 CAPSULE ORAL at 08:29

## 2020-08-02 RX ADMIN — INSULIN LISPRO 2 UNITS: 100 INJECTION, SOLUTION INTRAVENOUS; SUBCUTANEOUS at 20:40

## 2020-08-02 RX ADMIN — ENOXAPARIN SODIUM 40 MG: 40 INJECTION SUBCUTANEOUS at 08:28

## 2020-08-02 RX ADMIN — ACETAMINOPHEN 650 MG: 325 TABLET ORAL at 20:33

## 2020-08-02 RX ADMIN — MICONAZOLE NITRATE: 20 POWDER TOPICAL at 08:36

## 2020-08-02 RX ADMIN — GABAPENTIN 300 MG: 300 CAPSULE ORAL at 13:30

## 2020-08-02 RX ADMIN — ASPIRIN 81 MG 81 MG: 81 TABLET ORAL at 08:29

## 2020-08-02 RX ADMIN — Medication 10.5 MG: at 20:36

## 2020-08-02 RX ADMIN — MICONAZOLE NITRATE: 20 POWDER TOPICAL at 20:40

## 2020-08-02 RX ADMIN — GABAPENTIN 300 MG: 300 CAPSULE ORAL at 20:34

## 2020-08-02 RX ADMIN — LEVOTHYROXINE SODIUM 50 MCG: 50 TABLET ORAL at 05:48

## 2020-08-02 ASSESSMENT — PAIN DESCRIPTION - PAIN TYPE
TYPE: CHRONIC PAIN
TYPE: CHRONIC PAIN

## 2020-08-02 ASSESSMENT — PAIN SCALES - GENERAL
PAINLEVEL_OUTOF10: 6
PAINLEVEL_OUTOF10: 4
PAINLEVEL_OUTOF10: 0
PAINLEVEL_OUTOF10: 0
PAINLEVEL_OUTOF10: 6
PAINLEVEL_OUTOF10: 0
PAINLEVEL_OUTOF10: 6
PAINLEVEL_OUTOF10: 0

## 2020-08-02 ASSESSMENT — PAIN DESCRIPTION - FREQUENCY
FREQUENCY: CONTINUOUS
FREQUENCY: CONTINUOUS

## 2020-08-02 ASSESSMENT — PAIN DESCRIPTION - DESCRIPTORS
DESCRIPTORS: THROBBING
DESCRIPTORS: ACHING;THROBBING

## 2020-08-02 ASSESSMENT — PAIN DESCRIPTION - LOCATION: LOCATION: BACK;ANKLE;SHOULDER

## 2020-08-02 ASSESSMENT — PAIN DESCRIPTION - ONSET: ONSET: ON-GOING

## 2020-08-02 ASSESSMENT — PAIN DESCRIPTION - ORIENTATION: ORIENTATION: MID

## 2020-08-02 NOTE — PROGRESS NOTES
Hospitalist Progress Note    Patient:  Yulisa Bazzi      Unit/Bed:6K-23/023-A    YOB: 1970    MRN: [id number]       Acct: [de-identified]     PCP: WAGNER Mcclain CNP    Date of Admission: 7/26/2020    Active Hospital Problems    Diagnosis Date Noted    Syncope and collapse [R55]     Methamphetamine abuse ([facility] [address] [F15.10]     Abnormal thyroid function test [R94.6]     Polysubstance abuse ([facility] [address] [F19.10]     Hypothyroidism [E03.9]     Obesity (BMI 30-39. 9) [E66.9]     Moderate episode of recurrent major depressive disorder ([facility] [address] [F33.1]     Hyperglycemia [R73.9] 07/26/2020       Assessment/Plan:  1. Recurrent syncopal episodes associated with CP: NYD etiology. Likely multifactorial. Polypharmacy? +/- dysautonomia +/- substance use. No arrhythmias on Tele. CT head unremarkable. CTA of chest unremarkable for PE. Carotid US CTA of neck. Last echo showed an EF of 50-55% Echocardiogram and otherwise non-contributory. No orthostasis? Cardiology following w/ planned stress test and TTE today. on Torsemide currently held due to question of polypharmacy. Repeat orthostatics in am. Metoprolol held due to stress test which will resume after procedure. D/c'ed NS at 125 cc/hr. PT/OT to see  7/29: Cardiac W/U non-contributory. Orthosis resolved. Pt still feels dizzy, PT/OT to see. Otherwise, will plan for BP med/diuretic regimen modification and 30-day Event monitor at discharge  8/1: remained stable, assessed by PT/OT and deemed unsafe for discharge home given physical deconditioning and L ankle fracture  8/2: CCM   . 2. DM II w/ Hyperglycemia: Due to non-adherence to mediations. Poorly controlled w/ A1C 14.3. BS improved on home MDI; will consider optimizing MDI regimen; also arrange for diabetes education clinic  7/30: BS fairly well-controlled. Will lead further optimization of DM to PCP/diabetic clinic. Pt and PCP to monitor BS and adjust regimen accordingly. 8/2: fairly well-controlled. improved; denies CP/SOB/focal neuro deficit/N/V/ fever/chills/palpitation/presyncope      Medications:  Reviewed    Infusion Medications    dextrose       Scheduled Medications    enoxaparin  40 mg Subcutaneous Daily    insulin lispro  0-12 Units Subcutaneous TID WC    insulin lispro  0-6 Units Subcutaneous Nightly    miconazole   Topical BID    insulin glargine  25 Units Subcutaneous BID    melatonin  10.5 mg Oral Nightly    divalproex  125 mg Oral Daily    aspirin  81 mg Oral Daily    gabapentin  300 mg Oral TID    pantoprazole  40 mg Oral QAM AC    levothyroxine  50 mcg Oral QAM AC    [Held by provider] losartan  25 mg Oral QAM AC    metoprolol tartrate  25 mg Oral BID    mirtazapine  15 mg Oral Nightly    OLANZapine  5 mg Oral Nightly    [Held by provider] torsemide  20 mg Oral Daily    venlafaxine  75 mg Oral Daily     PRN Meds: glucose, dextrose, glucagon (rDNA), dextrose, acetaminophen, dextrose, potassium chloride, magnesium sulfate, sodium phosphate IVPB **OR** sodium phosphate IVPB **OR** sodium phosphate IVPB      Intake/Output Summary (Last 24 hours) at 8/2/2020 0802  Last data filed at 8/2/2020 0455  Gross per 24 hour   Intake 1240 ml   Output 0 ml   Net 1240 ml       Diet:  DIET CARB CONTROL; Carb Control: 4 carb choices (60 gms)/meal    Exam:  /60   Pulse 55   Temp 97.9 °F (36.6 °C) (Oral)   Resp 16   Ht 5' 5\" (1.651 m)   Wt 240 lb 14.4 oz (109.3 kg)   SpO2 96%   BMI 40.09 kg/m²     General appearance: Obese, A&O x3, Not ill or toxic, in no apparent distress  HEENT:  ANDRE  EOM intact. Neck: Supple, with full range of motion. No jugular venous distention. Trachea midline. Respiratory:   NL A/E bilat with no adventitious sounds   Cardiovascular:  normal S1/S2 with no murmurs/gallops  Abdomen: Soft, non-tender, non-distended, no rigidity or peritoneal signs  Musculoskeletal: NL symmetrical A/PROM bilat U/L extremities   Skin: No rashes.  No edema  Neurologic:  CN II-XII intact. NL symmetrical reflexes. NL gait and stance. NL Cerebellar exam. Power 5/5 all muscle groups U/L extremities. Toes downgoing  Capillary Refill: Brisk,< 3 seconds   Peripheral Pulses: +2 palpable, equal bilaterally        Labs:   No results for input(s): WBC, HGB, HCT, PLT in the last 72 hours. Recent Labs     07/31/20  0532 08/01/20  0625 08/02/20  0550    135 139   K 3.9 4.1 4.3    101 105   CO2 28 26 25   BUN 12 15 13   CREATININE 0.7 0.6 0.6   CALCIUM 8.3* 8.5 8.8     No results for input(s): AST, ALT, BILIDIR, BILITOT, ALKPHOS in the last 72 hours. No results for input(s): INR in the last 72 hours. No results for input(s): Burnadette Lundborg in the last 72 hours. Urinalysis:      Lab Results   Component Value Date    NITRU NEGATIVE 07/26/2020    BLOODU NEGATIVE 07/26/2020    GLUCOSEU >= 1000 07/26/2020       Radiology:  Ct Head Wo Contrast    Result Date: 7/26/2020  PROCEDURE: CT HEAD WO CONTRAST CLINICAL INFORMATION: fall, struck head. COMPARISON: None TECHNIQUE: Noncontrast 5 mm axial images were obtained through the brain. Sagittal and coronal reconstructions were obtained and reviewed. All CT scans at this facility use dose modulation, iterative reconstruction, and/or weight-based dosing when appropriate to reduce radiation dose to as low as reasonably achievable. FINDINGS: Brain: There is no acute ischemic infarct, hemorrhage, midline shift, mass, or mass effect. There is no focal abnormality of brain parenchymal attenuation. Ventricles/basal cisterns: The ventricles and cisterns are of appropriate size and configuration for the patient's age. No evidence of obstructive hydrocephalus. Skull base/calvarium/osseous structures: Unremarkable Soft tissues: Unremarkable Intraorbital contents: Unremarkable Sinuses: Unremarkable; the imaged sinuses are clear without evidence of mucosal thickening or fluid levels. Mastoids: Unremarkable; the mastoid air cells are clear.      No acute ischemic infarct, hemorrhage, or mass effect. **This report has been created using voice recognition software. It may contain minor errors which are inherent in voice recognition technology. ** Final report electronically signed by Dr. Sal Dueñas on 7/26/2020 10:01 PM    Cta Chest W Wo Contrast    Result Date: 7/26/2020  PROCEDURE: CTA CHEST W WO CONTRAST CLINICAL INFORMATION: LOC, dizziness. . COMPARISON: Chest x-ray earlier today TECHNIQUE: 3 mm thick by 1.5 mm interval axial images were obtained through the chest after the administration of IV contrast.  A non-contrast localizer was obtained. Sagittal and coronal MIP 3D reconstructions were performed on the scanner. 80 mL Isovue-370 were administered intravenously. All CT scans at this facility use dose modulation, iterative reconstruction, and/or weight-based dosing when appropriate to reduce radiation dose to as low as reasonably achievable. FINDINGS: Lungs: There is no pneumonia or mass. There is very mild bilateral upper and lower lobe dependent atelectasis. The trachea and central bronchi are unremarkable. Pleura: There is no pleural effusion. There is no pneumothorax. Heart: Heart size is normal. The patient is status post sternotomy for CABG surgery. There is no pericardial effusion. Pulmonary vasculature: There is adequate opacification of the pulmonary arteries. There is no pulmonary embolism. Afua and mediastinum: There is no hilar or mediastinal mass or adenopathy. Thoracic aorta/vascular: There is no thoracic aortic aneurysm or dissection. The imaged brachiocephalic arteries are unremarkable. Imaged upper abdomen: There is a subcentimeter calcified gallstone in the gallbladder. There is no biliary dilatation. There are a few hepatic calcified granulomas. There is a 2 to 3 mm nonobstructing upper/mid right renal calculus.  Musculoskeletal system: Unremarkable Chest/body wall soft tissues: Unremarkable Thyroid: Unremarkable     No acute pulmonary ECA---------------->124/14cm/s VERT-------------->38/16cm/s     No hemodynamically significant stenosis by NASCET criteria in either internal carotid artery. Antegrade vertebral artery blood flow bilaterally. Carotid Stenosis Reference Using SRU Criteria:   Mild - <50% stenosis. ICA PSV is less than 125 cm/second and plaque or intimal thickening is visible. Moderate - 50-69% stenosis. ICA PSV is 125 to 230 cm/second and plaque is visible. Severe - 70-94% stenosis. ICA PSV is more than 230 cm/second and visible plaque with lumen narrowing is seen. Near occlusion - 95-99% stenosis. ICA PSV is variable and significant plaque with luminal narrowing is seen. Occluded - 100% stenosis. No flow identified. **This report has been created using voice recognition software. It may contain minor errors which are inherent in voice recognition technology. ** Final report electronically signed by Dr. Suha Morales on 7/27/2020 6:11 PM      Diet: DIET CARB CONTROL; Carb Control: 4 carb choices (60 gms)/meal    DVT prophylaxis: [x] Lovenox                                 [] SCDs                                 [] SQ Heparin                                 [] Encourage ambulation           [] Already on Anticoagulation       Code Status: Full Code      Active Hospital Problems    Diagnosis Date Noted    Syncope and collapse [R55]     Methamphetamine abuse ([facility] [address] [F15.10]     Abnormal thyroid function test [R94.6]     Polysubstance abuse ([facility] [address] [F19.10]     Hypothyroidism [E03.9]     Obesity (BMI 30-39. 9) [E66.9]     Moderate episode of recurrent major depressive disorder ([facility] [address] [F33.1]     Hyperglycemia [R73.9] 07/26/2020           With RN in room, patient was updated about the treatment plan, all the questions and concerns were addressed.         Electronically signed by Jt Cifuentes MD on 8/2/2020 at 8:02 AM

## 2020-08-02 NOTE — PLAN OF CARE
Problem: Falls - Risk of:  Goal: Will remain free from falls  8/2/2020 0104 by Estuardo Lopez RN  Outcome: Ongoing  Note: The patient has been free of falls this shift. Bed is in low position, 2/4 rails are up, and alarm is active. Call light is in reach, 2/4 rails are up, and hourly rounding performed. 8/1/2020 1304 by Allison Mccloud RN  Outcome: Ongoing  Goal: Absence of physical injury  8/2/2020 0104 by Estuardo Lopez RN  Outcome: Ongoing  8/1/2020 1304 by Allison Mccloud RN  Outcome: Ongoing     Problem: Musculor/Skeletal Functional Status  Goal: Highest potential functional level  8/2/2020 0104 by Estuardo Lopez RN  Outcome: Ongoing  8/1/2020 1304 by Allison Mccloud RN  Outcome: Ongoing  Goal: Absence of falls  8/2/2020 0104 by Estuardo Lopez RN  Outcome: Ongoing  8/1/2020 1304 by Allison Mccloud RN  Outcome: Ongoing     Problem: Discharge Planning:  Goal: Participates in care planning  8/2/2020 0104 by Estuardo Lopez RN  Outcome: Ongoing  Note: Discharge is pending at this time. Waiting for Dr. Rufino Avalos to see patient. 8/1/2020 1304 by Allison Mccloud RN  Outcome: Ongoing  Goal: Discharged to appropriate level of care  8/2/2020 0104 by Estuardo Lopez RN  Outcome: Ongoing  8/1/2020 1304 by Allison Mccloud RN  Outcome: Ongoing     Problem: Activity Intolerance:  Goal: Ability to tolerate increased activity will improve  8/2/2020 0104 by Estuardo Lopez RN  Outcome: Ongoing  8/1/2020 1304 by Allison Mccloud RN  Outcome: Ongoing     Problem: Pain:  Goal: Pain level will decrease  8/2/2020 0104 by Estuardo Lopez RN  Outcome: Ongoing  Note: No c/o pain this shift.   8/1/2020 1304 by Allison Mccloud RN  Outcome: Ongoing  Goal: Ability to notify healthcare provider of pain before it becomes unmanageable or unbearable will improve  8/2/2020 0104 by Estuardo Lopez RN  Outcome: Ongoing  8/1/2020 1304 by Allison Mccloud RN  Outcome: Ongoing  Goal: Control of acute pain  8/2/2020 0104 by Priscila Garrido

## 2020-08-03 LAB
ANION GAP SERPL CALCULATED.3IONS-SCNC: 8 MEQ/L (ref 8–16)
BUN BLDV-MCNC: 13 MG/DL (ref 7–22)
CALCIUM SERPL-MCNC: 8.8 MG/DL (ref 8.5–10.5)
CHLORIDE BLD-SCNC: 102 MEQ/L (ref 98–111)
CO2: 29 MEQ/L (ref 23–33)
CREAT SERPL-MCNC: 0.7 MG/DL (ref 0.4–1.2)
GFR SERPL CREATININE-BSD FRML MDRD: 89 ML/MIN/1.73M2
GLUCOSE BLD-MCNC: 148 MG/DL (ref 70–108)
GLUCOSE BLD-MCNC: 163 MG/DL (ref 70–108)
GLUCOSE BLD-MCNC: 188 MG/DL (ref 70–108)
GLUCOSE BLD-MCNC: 189 MG/DL (ref 70–108)
GLUCOSE BLD-MCNC: 222 MG/DL (ref 70–108)
MAGNESIUM: 1.7 MG/DL (ref 1.6–2.4)
POTASSIUM SERPL-SCNC: 4.2 MEQ/L (ref 3.5–5.2)
SODIUM BLD-SCNC: 139 MEQ/L (ref 135–145)

## 2020-08-03 PROCEDURE — 97530 THERAPEUTIC ACTIVITIES: CPT

## 2020-08-03 PROCEDURE — 99226 PR SBSQ OBSERVATION CARE/DAY 35 MINUTES: CPT | Performed by: HOSPITALIST

## 2020-08-03 PROCEDURE — 96372 THER/PROPH/DIAG INJ SC/IM: CPT

## 2020-08-03 PROCEDURE — 6370000000 HC RX 637 (ALT 250 FOR IP): Performed by: INTERNAL MEDICINE

## 2020-08-03 PROCEDURE — 82948 REAGENT STRIP/BLOOD GLUCOSE: CPT

## 2020-08-03 PROCEDURE — 36415 COLL VENOUS BLD VENIPUNCTURE: CPT

## 2020-08-03 PROCEDURE — 97110 THERAPEUTIC EXERCISES: CPT

## 2020-08-03 PROCEDURE — 80048 BASIC METABOLIC PNL TOTAL CA: CPT

## 2020-08-03 PROCEDURE — G0378 HOSPITAL OBSERVATION PER HR: HCPCS

## 2020-08-03 PROCEDURE — 6370000000 HC RX 637 (ALT 250 FOR IP): Performed by: PHYSICIAN ASSISTANT

## 2020-08-03 PROCEDURE — 83735 ASSAY OF MAGNESIUM: CPT

## 2020-08-03 PROCEDURE — 97535 SELF CARE MNGMENT TRAINING: CPT

## 2020-08-03 PROCEDURE — 6360000002 HC RX W HCPCS: Performed by: HOSPITALIST

## 2020-08-03 RX ADMIN — METOPROLOL TARTRATE 25 MG: 50 TABLET, FILM COATED ORAL at 07:59

## 2020-08-03 RX ADMIN — PANTOPRAZOLE SODIUM 40 MG: 40 TABLET, DELAYED RELEASE ORAL at 05:51

## 2020-08-03 RX ADMIN — MICONAZOLE NITRATE: 20 POWDER TOPICAL at 07:58

## 2020-08-03 RX ADMIN — OLANZAPINE 5 MG: 5 TABLET, FILM COATED ORAL at 20:48

## 2020-08-03 RX ADMIN — ACETAMINOPHEN 650 MG: 325 TABLET ORAL at 11:08

## 2020-08-03 RX ADMIN — GABAPENTIN 300 MG: 300 CAPSULE ORAL at 07:59

## 2020-08-03 RX ADMIN — ASPIRIN 81 MG 81 MG: 81 TABLET ORAL at 07:59

## 2020-08-03 RX ADMIN — GABAPENTIN 300 MG: 300 CAPSULE ORAL at 16:03

## 2020-08-03 RX ADMIN — INSULIN GLARGINE 25 UNITS: 100 INJECTION, SOLUTION SUBCUTANEOUS at 20:48

## 2020-08-03 RX ADMIN — ENOXAPARIN SODIUM 40 MG: 40 INJECTION SUBCUTANEOUS at 07:59

## 2020-08-03 RX ADMIN — METOPROLOL TARTRATE 25 MG: 50 TABLET, FILM COATED ORAL at 20:48

## 2020-08-03 RX ADMIN — ACETAMINOPHEN 650 MG: 325 TABLET ORAL at 05:52

## 2020-08-03 RX ADMIN — LEVOTHYROXINE SODIUM 50 MCG: 50 TABLET ORAL at 05:51

## 2020-08-03 RX ADMIN — INSULIN GLARGINE 25 UNITS: 100 INJECTION, SOLUTION SUBCUTANEOUS at 07:57

## 2020-08-03 RX ADMIN — DIVALPROEX SODIUM 125 MG: 125 CAPSULE ORAL at 07:58

## 2020-08-03 RX ADMIN — VENLAFAXINE HYDROCHLORIDE 75 MG: 75 CAPSULE, EXTENDED RELEASE ORAL at 07:58

## 2020-08-03 RX ADMIN — INSULIN LISPRO 1 UNITS: 100 INJECTION, SOLUTION INTRAVENOUS; SUBCUTANEOUS at 20:48

## 2020-08-03 RX ADMIN — GABAPENTIN 300 MG: 300 CAPSULE ORAL at 20:48

## 2020-08-03 RX ADMIN — Medication 10.5 MG: at 20:48

## 2020-08-03 RX ADMIN — MIRTAZAPINE 15 MG: 15 TABLET, FILM COATED ORAL at 20:48

## 2020-08-03 RX ADMIN — MICONAZOLE NITRATE: 20 POWDER TOPICAL at 20:48

## 2020-08-03 ASSESSMENT — PAIN SCALES - GENERAL
PAINLEVEL_OUTOF10: 0
PAINLEVEL_OUTOF10: 6
PAINLEVEL_OUTOF10: 0
PAINLEVEL_OUTOF10: 4
PAINLEVEL_OUTOF10: 0

## 2020-08-03 NOTE — PLAN OF CARE
Problem: Musculor/Skeletal Functional Status  Goal: Absence of falls  8/3/2020 0252 by Kathy Jarrell RN  Outcome: Ongoing     Problem: Discharge Planning:  Goal: Participates in care planning  Description: Participates in care planning  8/3/2020 0252 by Kathy Jarrell RN  Outcome: Ongoing  Note: Pt to be discharged to . [facility] [address]. Problem: Activity Intolerance:  Goal: Ability to tolerate increased activity will improve  Description: Ability to tolerate increased activity will improve  8/3/2020 0252 by Kathy Jarrell RN  Outcome: Ongoing  Note: Patient is up with assistance of staff and WB boot. Problem: Pain:  Goal: Control of chronic pain  Description: Control of chronic pain  8/3/2020 0252 by Kahty Jarrell RN  Outcome: Ongoing  Note: Patient c/o headache during shift. Tylenol given every 6hours PRN. Will continue to reassess     Problem: Serum Glucose Level - Abnormal:  Goal: Ability to maintain appropriate glucose levels will improve to within specified parameters  Description: Ability to maintain appropriate glucose levels will improve to within specified parameters  8/3/2020 0252 by Kathy Jarrell RN  Outcome: Ongoing  Note: CHEM AC/HS     Problem: Skin Integrity - Impaired:  Goal: Absence of new skin breakdown  Description: Absence of new skin breakdown  8/3/2020 0252 by Kathy Jarrell RN  Outcome: Ongoing  Note: Patient free from skin breakdown. Patient turns self and makes frequent positional changes. Will continue to monitor. Problem: Falls - Risk of:  Goal: Will remain free from falls  Description: Will remain free from falls  8/3/2020 0252 by Kathy Jarrell RN  Outcome: Ongoing  Note: Call light within reach. Side rails up x2. Bed alarm on. Pt up with staff help/walking boot. Care plan reviewed with patient. Patient verbalized understanding of the plan of care and contribute to goal setting.

## 2020-08-03 NOTE — PROGRESS NOTES
face was told WB status change) for home ambulation  Short term goal 4: Pt to tolerate 15 reps B LE ther ex to improve strength for functional mobility  Long term goals  Time Frame for Long term goals : No LTGs secondary to ELOS    Following session, patient left in safe position with all fall risk precautions in place.

## 2020-08-03 NOTE — PROGRESS NOTES
Hospitalist Progress Note    Patient:  Victorina Olmedo      Unit/Bed:6K-23/023-A    YOB: 1970    MRN: [id number]       Acct: [de-identified]     PCP: WAGNER Nielsen CNP    Date of Admission: 7/26/2020    Active Hospital Problems    Diagnosis Date Noted    Syncope and collapse [R55]     Methamphetamine abuse ([facility] [address] [F15.10]     Abnormal thyroid function test [R94.6]     Polysubstance abuse ([facility] [address] [F19.10]     Hypothyroidism [E03.9]     Obesity (BMI 30-39. 9) [E66.9]     Moderate episode of recurrent major depressive disorder ([facility] [address] [F33.1]     Hyperglycemia [R73.9] 07/26/2020       Assessment/Plan:  1. Recurrent syncopal episodes associated with CP: NYD etiology. Likely multifactorial. Polypharmacy? +/- dysautonomia +/- substance use. No arrhythmias on Tele. CT head unremarkable. CTA of chest unremarkable for PE. Carotid US CTA of neck. Last echo showed an EF of 50-55% Echocardiogram and otherwise non-contributory. No orthostasis? Cardiology following w/ planned stress test and TTE today. on Torsemide currently held due to question of polypharmacy. Repeat orthostatics in am. Metoprolol held due to stress test which will resume after procedure. D/c'ed NS at 125 cc/hr. PT/OT to see  7/29: Cardiac W/U non-contributory. Orthosis resolved. Pt still feels dizzy, PT/OT to see. Otherwise, will plan for BP med/diuretic regimen modification and 30-day Event monitor at discharge  8/1: remained stable, assessed by PT/OT and deemed unsafe for discharge home given physical deconditioning and L ankle fracture  8/3: CCM   . 2. DM II w/ Hyperglycemia: Due to non-adherence to mediations. Poorly controlled w/ A1C 14.3. BS improved on home MDI; will consider optimizing MDI regimen; also arrange for diabetes education clinic  7/30: BS fairly well-controlled. Will lead further optimization of DM to PCP/diabetic clinic. Pt and PCP to monitor BS and adjust regimen accordingly. 8/3: fairly well-controlled. [facility]       3. Relapsed methamphetamine abuse: Seen by Addiction services and OP resources offered   4. Hypokalemia: Resolved w/ replacement. 5. Chronic hepatitis C: not on treatment; Will need GI outpatient F/U for consideration of antiviral therapy  6. History of polysubstance abuse: denies cocaine use; on BB; will cousel about risks associated w/ Cocaine use  7. Hypothyroidism: on Levothyroxine; TSH marginally elevated; PCP to monitor and consider dose adjustment  8. Hx of HTN: BP well-controlled on current in-hospital regimen which continued at discharge; given#1 did not resume ARB at [address]; PCP to possibly consider resuming for nephropathy protection  9. GERD: on PPI  10. Hx of depression: Continue home meds  11. Obersity w/ BMI 38.3  : Counseling offered  12. Minimally-displaced Velasquez A L distal fibular#: treated in long leg airboot cast, WBAT/AAT  13. PT/OT followed; pt deemed unsafe for discharge and recommended ECF; pt also wishes to go to [facility]  7/31: declined SNF; SW/ on board. Also consulted PM&R for possible rehab transfer  8/2: awaiting placement        Expected discharge date:  TBD (delayed d/t concerns for pt's safety at home alone given ongoing dizziness, discharge later prolonged d/t difficulty w/ placement, transfer orders in already)  Awaiting precert     Disposition:      [] Home                             [] TCU                             [] Rehab                             [] Psych                             [] SNF                             [] [place]                             [] Other- TBD    Chief Complaint:   Chief Complaint   Patient presents with    Fall   Carmen Arch Vaginitis       Hospital Course: Patient was seen, examined and the medical chart was reviewed thoroughly today. In summary, 52 y. o.female admitted on 7/26/2020 for evaluation of syncope. Cardiology following. I took over care on 7/28.       Subjective (past 24 hours):  still feels dizzy w/ edema  Neurologic:  CN II-XII intact. NL symmetrical reflexes. NL gait and stance. NL Cerebellar exam. Power 5/5 all muscle groups U/L extremities. Toes downgoing  Capillary Refill: Brisk,< 3 seconds   Peripheral Pulses: +2 palpable, equal bilaterally        Labs:   No results for input(s): WBC, HGB, HCT, PLT in the last 72 hours. Recent Labs     08/01/20  0625 08/02/20  0550 08/03/20  0606    139 139   K 4.1 4.3 4.2    105 102   CO2 26 25 29   BUN 15 13 13   CREATININE 0.6 0.6 0.7   CALCIUM 8.5 8.8 8.8     No results for input(s): AST, ALT, BILIDIR, BILITOT, ALKPHOS in the last 72 hours. No results for input(s): INR in the last 72 hours. No results for input(s): [place] in the last 72 hours. Urinalysis:      Lab Results   Component Value Date    NITRU NEGATIVE 07/26/2020    BLOODU NEGATIVE 07/26/2020    GLUCOSEU >= 1000 07/26/2020       Radiology:  Ct Head Wo Contrast    Result Date: 7/26/2020  PROCEDURE: CT HEAD WO CONTRAST CLINICAL INFORMATION: fall, struck head. COMPARISON: None TECHNIQUE: Noncontrast 5 mm axial images were obtained through the brain. Sagittal and coronal reconstructions were obtained and reviewed. All CT scans at this facility use dose modulation, iterative reconstruction, and/or weight-based dosing when appropriate to reduce radiation dose to as low as reasonably achievable. FINDINGS: Brain: There is no acute ischemic infarct, hemorrhage, midline shift, mass, or mass effect. There is no focal abnormality of brain parenchymal attenuation. Ventricles/basal cisterns: The ventricles and cisterns are of appropriate size and configuration for the patient's age. No evidence of obstructive hydrocephalus. Skull base/calvarium/osseous structures: Unremarkable Soft tissues: Unremarkable Intraorbital contents: Unremarkable Sinuses: Unremarkable; the imaged sinuses are clear without evidence of mucosal thickening or fluid levels.  Mastoids: Unremarkable; the mastoid air cells No acute pulmonary embolism. No acute cardiopulmonary disease. Status post CABG surgery. Subcentimeter calcified gallstone in the gallbladder. 2 to 3 mm nonobstructing right renal calculus. **This report has been created using voice recognition software. It may contain minor errors which are inherent in voice recognition technology. ** Final report electronically signed by Dr. Abdi Rolon on 7/26/2020 10:07 PM    Xr Chest Portable    Result Date: 7/26/2020  PROCEDURE: XR CHEST PORTABLE CLINICAL INFORMATION: 42-year-old female with dizziness. COMPARISON: Chest x-ray 6/23/2020. TECHNIQUE: AP upright view of the chest was obtained. FINDINGS: Metallic wire sutures are in the sternum There are low lung volumes which accentuate the pulmonary vasculature. The cardiac silhouette is normal in size. There is no significant pleural effusion or pneumothorax. Visualized portions of the upper abdomen are within normal limits. The osseous structures are intact. No acute fractures or suspicious osseous lesions. There is no acute intrathoracic process. **This report has been created using voice recognition software. It may contain minor errors which are inherent in voice recognition technology. ** Final report electronically signed by Dr Asiya Stone on 7/26/2020 8:14 PM    Vl Dup Carotid Bilateral    Result Date: 7/27/2020  PROCEDURE: VL DUP CAROTID BILATERAL CLINICAL INFORMATION: syncopal episodes. COMPARISON: No prior study. TECHNIQUE:B-mode, spectral Doppler waveform analysis, and color-flow ultrasound of the carotid and vertebral arteries was performed bilaterally. FINDINGS: RIGHT On grayscale images there is mild arthritic changes the carotid bulb. PSV/EDV DIST CCA-------->91/17cm/s PROX ICA-------->81/21cm/s ECA---------------->82/11cm/s VERT-------------->43/11cm/s LEFT On Grayscale imaging there is mild atherosclerotic changes at the carotid bulb.  PSV/EDV DIST CCA-------->94/23cm/s PROX ICA-------->78/21cm/s ECA---------------->124/14cm/s VERT-------------->38/16cm/s     No hemodynamically significant stenosis by NASCET criteria in either internal carotid artery. Antegrade vertebral artery blood flow bilaterally. Carotid Stenosis Reference Using SRU Criteria:   Mild - <50% stenosis. ICA PSV is less than 125 cm/second and plaque or intimal thickening is visible. Moderate - 50-69% stenosis. ICA PSV is 125 to 230 cm/second and plaque is visible. Severe - 70-94% stenosis. ICA PSV is more than 230 cm/second and visible plaque with lumen narrowing is seen. Near occlusion - 95-99% stenosis. ICA PSV is variable and significant plaque with luminal narrowing is seen. Occluded - 100% stenosis. No flow identified. **This report has been created using voice recognition software. It may contain minor errors which are inherent in voice recognition technology. ** Final report electronically signed by Dr. Vanessa Barreto on 7/27/2020 6:11 PM      Diet: DIET CARB CONTROL; Carb Control: 4 carb choices (60 gms)/meal    DVT prophylaxis: [x] Lovenox                                 [] SCDs                                 [] SQ Heparin                                 [] Encourage ambulation           [] Already on Anticoagulation       Code Status: Full Code      Active Hospital Problems    Diagnosis Date Noted    Syncope and collapse [R55]     Methamphetamine abuse ([facility] [address] [F15.10]     Abnormal thyroid function test [R94.6]     Polysubstance abuse ([facility] [address] [F19.10]     Hypothyroidism [E03.9]     Obesity (BMI 30-39. 9) [E66.9]     Moderate episode of recurrent major depressive disorder ([facility] [address] [F33.1]     Hyperglycemia [R73.9] 07/26/2020           With RN in room, patient was updated about the treatment plan, all the questions and concerns were addressed.         Electronically signed by Nahomi Hester MD on 8/3/2020 at 10:13 AM

## 2020-08-03 NOTE — CARE COORDINATION
8/3/20, 12:21 PM EDT    DISCHARGE ONGOING EVALUATION:     [facility] day: 2  Location: -23/023-A Reason for admit: Hyperglycemia [R73.9]  Syncope and collapse [R55]   Treatment Plan of Care: Received as transfer from . DM management. Therapy to work with patient today.  Await rehab/physiatry eval.   Barriers to Discharge: await therapy and rehab evals  PCP: WAGNER Reaves CNP  Readmission Risk Score: 31%  Patient Goals/Plan/Treatment Preferences: possible IP Rehab, will require precert if felt to be a candidate

## 2020-08-03 NOTE — PROGRESS NOTES
[address] RENAL TELEMETRY 6K  Occupational Therapy  Daily Note  Time:   Time In: 8888  Time Out: 9571  Timed Code Treatment Minutes: 23 Minutes  Minutes: 23          Date: 8/3/2020  Patient Name: Chino Estrada,   Gender: female      Room: [facility]/023-A  MRN: [id number]  : 1970  (52 y.o.)  Referring Practitioner: Kathryn Ivory MD  Diagnosis: Hyperglycemia  Additional Pertinent Hx: 52 y.o. female whopresents to the emergency department from home for recurrent falls. The patient has uncontrolled diabetes, CAD, and vertigo. She has a hx of a bypass. The patient has had multiple episodes over the last three days where she loses consciousness and she wakes up on the floor with no recollection of the time that has passed. She is wearing a boot on the left foot from a fall 2/2 the vertigo and she broke her foot. Restrictions/Precautions:  Restrictions/Precautions: General Precautions, Fall Risk, Weight Bearing  Left Lower Extremity Weight Bearing: Non Weight Bearing(WBAT in Cam Boot per Dr. Tay Betancourt ())  Required Braces or Orthoses  Left Lower Extremity Brace: Boot  Position Activity Restriction  Other position/activity restrictions: + orthostatics, hx of vertigo    SUBJECTIVE: Pt seated upright in bed upon arrival, agreeable to OT session. PAIN: 3/10: LLE    COGNITION: Slow Processing    ADL:   Lower Extremity Dressing: Contact Guard Assistance. CGA for balance to manage clothing over hips. SBA Threading BLE into undergarment. BALANCE:  Sitting Balance:  Stand By Assistance. Standing Balance: Contact Guard Assistance. x1 minute in prep for mobility. BED MOBILITY:  Supine to Sit: Stand By Assistance   Scooting: Stand By Assistance     TRANSFERS  Sit to Stand:  [employer]. Stand to Sit: Contact Guard Assistance. FUNCTIONAL MOBILITY:  Assistive Device: Rolling Walker  Assist Level:  Contact Guard Assistance.    Distance: Completed functional mobility Pt tolerated treatment well with no complications  Pt aware of future appts  AVS printed and given to patient  short distance within pt room from EOB to bedside chair a pace, no LOB noted. Pt requires cue for walker safety to keep within REYMUNDO reaching chair- seated rest break after trial of mobility, mod fatigue noted. ADDITIONAL ACTIVITIES:  Completed BUE exercises x10 reps x1 sets using min resistance band in all joints/planes to increase strength and endurance required for ADLs. Pt required rest break between each exercise and min v/c for proper technique. ASSESSMENT:     Activity Tolerance:  Patient tolerance of  treatment: good. Discharge Recommendations: IP Rehab, Continue to assess pending progress, Patient would benefit from continued therapy after discharge(Pt is not safe to return home at this time d/t vertigo resulting in increased fall risk)  Equipment Recommendations: Other: defer to next level of care, should pt discharge home, pt needs BSC and shower chair  Plan: Times per week: 5x  Current Treatment Recommendations: Balance Training, Functional Mobility Training, Endurance Training, Safety Education & Training, Self-Care / ADL, Home Management Training, Patient/Caregiver Education & Training, Equipment Evaluation, Education, & procurement    Patient Education  Patient Education: ADL's, Home Exercise Program, Precautions, Assistive Device Safety and Safety with transfers and mobility.     Goals  Short term goals  Time Frame for Short term goals: By discharge  Short term goal 1: Pt will safely navigate, around environmental barriers, while maintaining NWB status with Mod I for inc indep and safety with toileting  Short term goal 2: Pt will demonstrate standing endurance >4 mins with 0 cues for NWB at Mod I in prep for sinkside grooming and SMP  Short term goal 3: Pt will complete BADL routine with min cues for ECT/safety/adaptive ADLs for inc independence with self care  Short term goal 4: Pt will tolerate 15 reps of BUE strengthening HEP for increasing endurance required for bathing

## 2020-08-03 NOTE — FLOWSHEET NOTE
Per CLIFFORD Napier by phone, Patient is weight bearing as tolerated in CAM boot as of July 20, 2020.

## 2020-08-03 NOTE — CARE COORDINATION
8/3/20, 7:15 AM EDT    DISCHARGE BARRIERS        Patient transferred to [address] [place] [address]. Report given to unit Prabhu Wells, regarding discharge plan for this patient.

## 2020-08-04 VITALS
SYSTOLIC BLOOD PRESSURE: 127 MMHG | HEART RATE: 55 BPM | DIASTOLIC BLOOD PRESSURE: 67 MMHG | TEMPERATURE: 97.4 F | BODY MASS INDEX: 39.85 KG/M2 | RESPIRATION RATE: 18 BRPM | OXYGEN SATURATION: 95 % | WEIGHT: 239.2 LBS | HEIGHT: 65 IN

## 2020-08-04 LAB
ANION GAP SERPL CALCULATED.3IONS-SCNC: 10 MEQ/L (ref 8–16)
BUN BLDV-MCNC: 15 MG/DL (ref 7–22)
CALCIUM SERPL-MCNC: 8.8 MG/DL (ref 8.5–10.5)
CHLORIDE BLD-SCNC: 104 MEQ/L (ref 98–111)
CO2: 27 MEQ/L (ref 23–33)
CREAT SERPL-MCNC: 0.7 MG/DL (ref 0.4–1.2)
GFR SERPL CREATININE-BSD FRML MDRD: 89 ML/MIN/1.73M2
GLUCOSE BLD-MCNC: 124 MG/DL (ref 70–108)
GLUCOSE BLD-MCNC: 127 MG/DL (ref 70–108)
GLUCOSE BLD-MCNC: 165 MG/DL (ref 70–108)
MAGNESIUM: 1.6 MG/DL (ref 1.6–2.4)
POTASSIUM SERPL-SCNC: 4.3 MEQ/L (ref 3.5–5.2)
SODIUM BLD-SCNC: 141 MEQ/L (ref 135–145)

## 2020-08-04 PROCEDURE — 80048 BASIC METABOLIC PNL TOTAL CA: CPT

## 2020-08-04 PROCEDURE — G0378 HOSPITAL OBSERVATION PER HR: HCPCS

## 2020-08-04 PROCEDURE — 6370000000 HC RX 637 (ALT 250 FOR IP): Performed by: PHYSICIAN ASSISTANT

## 2020-08-04 PROCEDURE — 36415 COLL VENOUS BLD VENIPUNCTURE: CPT

## 2020-08-04 PROCEDURE — 99217 PR OBSERVATION CARE DISCHARGE MANAGEMENT: CPT | Performed by: HOSPITALIST

## 2020-08-04 PROCEDURE — 96372 THER/PROPH/DIAG INJ SC/IM: CPT

## 2020-08-04 PROCEDURE — 82948 REAGENT STRIP/BLOOD GLUCOSE: CPT

## 2020-08-04 PROCEDURE — 6370000000 HC RX 637 (ALT 250 FOR IP): Performed by: INTERNAL MEDICINE

## 2020-08-04 PROCEDURE — 6360000002 HC RX W HCPCS: Performed by: HOSPITALIST

## 2020-08-04 PROCEDURE — 83735 ASSAY OF MAGNESIUM: CPT

## 2020-08-04 RX ADMIN — DIVALPROEX SODIUM 125 MG: 125 CAPSULE ORAL at 08:59

## 2020-08-04 RX ADMIN — VENLAFAXINE HYDROCHLORIDE 75 MG: 75 CAPSULE, EXTENDED RELEASE ORAL at 08:59

## 2020-08-04 RX ADMIN — GABAPENTIN 300 MG: 300 CAPSULE ORAL at 09:00

## 2020-08-04 RX ADMIN — LEVOTHYROXINE SODIUM 50 MCG: 50 TABLET ORAL at 05:50

## 2020-08-04 RX ADMIN — INSULIN GLARGINE 25 UNITS: 100 INJECTION, SOLUTION SUBCUTANEOUS at 09:00

## 2020-08-04 RX ADMIN — MICONAZOLE NITRATE: 20 POWDER TOPICAL at 08:59

## 2020-08-04 RX ADMIN — ENOXAPARIN SODIUM 40 MG: 40 INJECTION SUBCUTANEOUS at 09:00

## 2020-08-04 RX ADMIN — PANTOPRAZOLE SODIUM 40 MG: 40 TABLET, DELAYED RELEASE ORAL at 05:50

## 2020-08-04 RX ADMIN — ASPIRIN 81 MG 81 MG: 81 TABLET ORAL at 09:00

## 2020-08-04 RX ADMIN — METOPROLOL TARTRATE 25 MG: 50 TABLET, FILM COATED ORAL at 09:00

## 2020-08-04 ASSESSMENT — PAIN SCALES - GENERAL
PAINLEVEL_OUTOF10: 0
PAINLEVEL_OUTOF10: 0

## 2020-08-04 NOTE — PROGRESS NOTES
Spoke with Dr. Anahy Houston about patient and he stated that patient is doing too good with therapy for inpatient rehab. Hilton DEGROOT made aware.

## 2020-08-04 NOTE — DISCHARGE SUMMARY
Hospital Medicine Discharge Summary      Patient Identification:   Radha Valdivia   : 1970  MRN: [id number]   Account: [de-identified]      Patient's PCP: WAGNER Moore CNP    Admit Date: 2020     Discharge Date:   2020      Admitting Physician: Jose A Clemons MD     Discharge Physician: Sorin Xiao MD     Discharge Diagnoses: Active Hospital Problems    Diagnosis Date Noted    Syncope and collapse [R55]     Methamphetamine abuse (HCC) [F15.10]     Abnormal thyroid function test [R94.6]     Polysubstance abuse ([facility] [F19.10]     Hypothyroidism [E03.9]     Obesity (BMI 30-39. 9) [E66.9]     Moderate episode of recurrent major depressive disorder ([facility] [F33.1]     Hyperglycemia [R73.9] 2020       The patient was seen and examined on day of discharge and this discharge summary is in conjunction with any daily progress note from day of discharge. Hospital Course:   Radha Valdivia is a 52 y.o. female admitted to [address] [place] [address] on 2020 for evaluation of syncope. Pt was also assessed Cardiology service . I took over care on . Pt responded well to medical management, remained clinically stable and was discharged in stable conditions after cleared by consulting services. Assessment/Plan:    1. Recurrent syncopal episodes associated with CP: NYD etiology. Likely multifactorial. Polypharmacy? +/- dysautonomia +/- substance use. No arrhythmias on Tele. CT head unremarkable. CTA of chest unremarkable for PE. Carotid US CTA of neck. Last echo showed an EF of 50-55% Echocardiogram and otherwise non-contributory. No orthostasis? Cardiology following w/ planned stress test and TTE today. on Torsemide currently held due to question of polypharmacy. Repeat orthostatics in am. Metoprolol held due to stress test which will resume after procedure. D/c'ed NS at 125 cc/hr. PT/OT to see  : Cardiac W/U non-contributory. Orthosis resolved.  Pt still feels for possible rehab transfer  8/2: awaiting placement      Exam:     Vitals:  Vitals:    08/03/20 2044 08/03/20 2323 08/04/20 0330 08/04/20 0845   BP: 116/60 (!) 114/55 (!) 105/57 (!) 117/57   Pulse: 61 60 54 62   Resp: 16 16 16 17   Temp: 98.4 °F (36.9 °C) 97.9 °F (36.6 °C) 97.4 °F (36.3 °C) 97.6 °F (36.4 °C)   TempSrc: Oral Oral Oral Oral   SpO2: 96% 95% 95% 95%   Weight:   239 lb 3.2 oz (108.5 kg)    Height:         Weight: Weight: 239 lb 3.2 oz (108.5 kg)     24 hour intake/output:    Intake/Output Summary (Last 24 hours) at 8/4/2020 1117  Last data filed at 8/4/2020 0330  Gross per 24 hour   Intake 86 ml   Output 0 ml   Net 86 ml           General appearance: Obese, A&O x3, Not ill or toxic, in no apparent distress  HEENT:  ANDRE  EOM intact. Neck: Supple, with full range of motion. No jugular venous distention. Trachea midline. Respiratory:   NL A/E bilat with no adventitious sounds   Cardiovascular:  normal S1/S2 with no murmurs/gallops  Abdomen: Soft, non-tender, non-distended, no rigidity or peritoneal signs  Musculoskeletal: NL symmetrical A/PROM bilat U/L extremities   Skin: No rashes. No edema  Neurologic:  CN II-XII intact. NL symmetrical reflexes. NL gait and stance. NL Cerebellar exam. Power 5/5 all muscle groups U/L extremities. Toes downgoing  Capillary Refill: Brisk,< 3 seconds   Peripheral Pulses: +2 palpable, equal bilaterally      Labs:  For convenience and continuity at follow-up the following most recent labs are provided:      CBC:    Lab Results   Component Value Date    WBC 7.9 07/27/2020    HGB 12.9 07/27/2020    HCT 38.7 07/27/2020     07/27/2020       Renal:    Lab Results   Component Value Date     08/04/2020    K 4.3 08/04/2020    K 3.9 07/26/2020     08/04/2020    CO2 27 08/04/2020    BUN 15 08/04/2020    CREATININE 0.7 08/04/2020    CALCIUM 8.8 08/04/2020         Significant Diagnostic Studies    Radiology:   XR ANKLE LEFT (MIN 3 VIEWS)   Final Result Minimally displaced fracture of the lateral malleolus. Final report electronically signed by Dr. Micah Alves on 7/31/2020 9:48 AM      VL DUP CAROTID BILATERAL   Final Result      No hemodynamically significant stenosis by NASCET criteria in either internal carotid artery. Antegrade vertebral artery blood flow bilaterally. Carotid Stenosis Reference Using SRU Criteria:        Mild - <50% stenosis. ICA PSV is less than 125 cm/second and plaque or intimal thickening is visible. Moderate - 50-69% stenosis. ICA PSV is 125 to 230 cm/second and plaque is visible. Severe - 70-94% stenosis. ICA PSV is more than 230 cm/second and visible plaque with lumen narrowing is seen. Near occlusion - 95-99% stenosis. ICA PSV is variable and significant plaque with luminal narrowing is seen. Occluded - 100% stenosis. No flow identified. **This report has been created using voice recognition software. It may contain minor errors which are inherent in voice recognition technology. **      Final report electronically signed by Dr. Ernesto Mcgregor on 7/27/2020 6:11 PM      CT HEAD WO CONTRAST   Final Result      No acute ischemic infarct, hemorrhage, or mass effect. **This report has been created using voice recognition software. It may contain minor errors which are inherent in voice recognition technology. **      Final report electronically signed by Dr. Yaima Wynne on 7/26/2020 10:01 PM      CTA CHEST [address]   Final Result      No acute pulmonary embolism. No acute cardiopulmonary disease. Status post CABG surgery. Subcentimeter calcified gallstone in the gallbladder. 2 to 3 mm nonobstructing right renal calculus. **This report has been created using voice recognition software. It may contain minor errors which are inherent in voice recognition technology. **      Final report electronically signed by Dr. Yaima Wynne on 7/26/2020 10:07 PM      XR CHEST PORTABLE   Final Result   There is no acute intrathoracic process. **This report has been created using voice recognition software. It may contain minor errors which are inherent in voice recognition technology. **      Final report electronically signed by Dr Paolo Draper on 7/26/2020 8:14 PM             Consults:     [address]  IP CONSULT TO PSYCHIATRY  IP CONSULT TO [address]  IP CONSULT TO CARDIOLOGY  IP CONSULT TO SOCIAL WORK  IP CONSULT TO REHAB/TCU ADMISSION COORDINATOR  IP CONSULT TO PHYSICAL MEDICINE REHAB    Dis[facility]:    [facility]       [] TCU       [] Rehab       [] Psych       [] SNF       [] [place]       [] Other-    Condition at Discharge: Stable    Code Status:  Full Code     Patient Instructions:    Discharge lab work: N/A  Activity: activity as tolerated  Diet: DIET CARB CONTROL; Carb Control: 4 carb choices (60 gms)/meal      Follow-up visits:   [address] [place] [address] Dr Booker 59 1901   [address] [facility] CHF clinic Aug 13 @ 130pm 2nd floor [address]  [phone]      staff-please s/u w/i [address] [place] [address]  [address] [place] [address]  [phone]    with Meet Marquez (for IOP)         Discharge Medications:       Troy Nagel   Home Medication Instructions SIR:[id number]    Printed on:08/04/20 5087   Medication Information                      acetaminophen (TYLENOL) 325 MG tablet  Take 650 mg by mouth every 4 hours as needed             aspirin 81 MG chewable tablet  Take 81 mg by mouth daily             atomoxetine (STRATTERA) 25 MG capsule  Take 25 mg by mouth daily             atorvastatin (LIPITOR) 20 MG tablet  Take 1 tablet by mouth nightly             divalproex (DEPAKOTE) 125 MG DR tablet  Take 1 tablet by mouth daily              gabapentin (NEURONTIN) 300 MG capsule  Take 300 mg by mouth 3 times daily. insulin glargine (BASAGLAR KWIKPEN) 100 UNIT/ML injection pen  Inject 25 Units into the skin 2 times daily              insulin lispro (ADMELOG) 100 UNIT/ML injection vial  Inject 15 Units into the skin 3 times daily (before meals) \"per sliding scale\" according to patient             lansoprazole (PREVACID) 15 MG delayed release capsule  Take 15 mg by mouth every morning (before breakfast)             levothyroxine (SYNTHROID) 50 MCG tablet  Take 50 mcg by mouth every morning (before breakfast)             meclizine (ANTIVERT) 12.5 MG tablet  Take 12.5 mg by mouth every morning (before breakfast)             Melatonin 10 MG TABS  Take 10 mg by mouth nightly as needed (sleep)             metoprolol tartrate (LOPRESSOR) 25 MG tablet  Take 1 tablet by mouth 2 times daily             minocycline (MINOCIN;DYNACIN) 100 MG capsule  Take 1 capsule by mouth 2 times daily             mirtazapine (REMERON) 15 MG tablet  Take 1 tablet by mouth nightly             OLANZapine (ZYPREXA) 5 MG tablet  Take 1 tablet by mouth nightly             ondansetron (ZOFRAN-ODT) 4 MG disintegrating tablet  Take 4 mg by mouth every 6 hours as needed             Odyzuh-TjVxu-BjQdjk-FA-Omega (MULTIVITAMIN/MINERALS PO)  Take 1 tablet by mouth daily             TRULICITY 1.66 XO/0.6US SOPN  Inject 1 pen into the skin every 7 days On fridays             venlafaxine (EFFEXOR XR) 75 MG extended release capsule  Take 1 capsule by mouth daily                 Time Spent on discharge is more than 45 minutes in the examination, evaluation, counseling and review of medications and discharge plan. With RN in room, patient was updated about the treatment plan, all the questions and concerns were addressed. Alarming signs and symptoms to return to ED were explained in length. Signed: Thank you Anders Lennon APRN - SHARMAINE for the opportunity to be involved in this patient's care.     Electronically signed by Jennifer Horne Kirsten Courtney MD on 8/4/2020 at 11:17 AM

## 2020-08-04 NOTE — CARE COORDINATION
8/4/20, 1:46 PM EDT    Patient goals/plan/ treatment preferences discussed by  and . Patient goals/plan/ treatment preferences reviewed with patient/ family. Patient/ family verbalize understanding of discharge plan and are in agreement with goal/plan/treatment preferences. Understanding was demonstrated using the teach back method. AVS provided by RN at time of discharge, which includes all necessary medical information pertaining to the patients current course of illness, treatment, post-discharge goals of care, and treatment preferences. Pt discharged to home with roommate. SW unable to complete referral to [facility], they are NOT in network with pts insurance. Referral completed with [facility] with [facility].

## 2020-08-04 NOTE — PROGRESS NOTES
Patient AVS printed and reviewed with patient. Follow up appts made and reviewed with patient. Patient is in contact with the  from [employer] about getting insurance approved. [facility] requires that patient has open access and times and days of the week are in AVS for patient. Patient last set of vitals and assessment stable. See flow sheet. IV removed and belongings with patient. Patient friend on the unit and is going to drive patient home. Education given and emphasized about glucose management. Discussed signs of hypoglycemia and to seek medical help if juice and protein do not bring sugar up.

## 2020-08-04 NOTE — PLAN OF CARE
Problem: Falls - Risk of:  Goal: Will remain free from falls  Outcome: Ongoing  Note: The patient has been free of falls this shift. Bed is in low position, 2/4 rails are up, and alarm is active. Call light is in reach, 2/4 rails are up, and hourly rounding performed. Goal: Absence of physical injury  Outcome: Ongoing     Problem: Musculor/Skeletal Functional Status  Goal: Highest potential functional level  Outcome: Ongoing  Goal: Absence of falls  Outcome: Ongoing     Problem: Discharge Planning:  Goal: Participates in care planning  Outcome: Ongoing  Note: Discharge is pending at this time. Goal: Discharged to appropriate level of care  Outcome: Ongoing     Problem: Activity Intolerance:  Goal: Ability to tolerate increased activity will improve  Outcome: Ongoing  Note: The patient has been getting up with one person assist.      Problem: Pain:  Goal: Pain level will decrease  Outcome: Ongoing  Note: No c/o pain this shift. Goal: Ability to notify healthcare provider of pain before it becomes unmanageable or unbearable will improve  Outcome: Ongoing  Goal: Control of acute pain  Outcome: Ongoing  Goal: Control of chronic pain  Outcome: Ongoing     Problem: Serum Glucose Level - Abnormal:  Goal: Ability to maintain appropriate glucose levels will improve to within specified parameters  Outcome: Ongoing  Note: Chem was 189. Insulin given as ordered. Problem: Skin Integrity - Impaired:  Goal: Will show no infection signs and symptoms  Outcome: Ongoing  Note: No new skin breakdown this shift. Goal: Absence of new skin breakdown  Outcome: Ongoing   Care plan reviewed with patient. The patient verbalizes understanding and contributed to plan of care.

## 2020-08-04 NOTE — CARE COORDINATION
8/4/20, 7:25 AM EDT    DISCHARGE ONGOING EVALUATION:     [facility] day: 2  Location: 6K-23/023-A Reason for admit: Hyperglycemia [R73.9]  Syncope and collapse [R55]   Treatment Plan of Care: Working with PT/OT. Barriers to Discharge: await physiatry input, did not eval yesterday (Kathleen Shaver notified)  PCP: WAGNER Momin CNP  Readmission Risk Score: 31%  Patient Goals/Plan/Treatment Preferences: possible IP Rehab    *Update: Dr. Lulu Casillas feels patient is doing to good for IP Rehab. [id number]  [facility] notified.

## 2020-08-12 ENCOUNTER — HOSPITAL ENCOUNTER (EMERGENCY)
Age: 50
Discharge: HOME | End: 2020-08-12
Attending: EMERGENCY MEDICINE
Payer: MEDICAID

## 2020-08-12 VITALS
OXYGEN SATURATION: 97 % | SYSTOLIC BLOOD PRESSURE: 132 MMHG | HEART RATE: 79 BPM | TEMPERATURE: 98 F | BODY MASS INDEX: 37.28 KG/M2 | DIASTOLIC BLOOD PRESSURE: 62 MMHG | RESPIRATION RATE: 18 BRPM | WEIGHT: 224 LBS

## 2020-08-12 LAB
ALBUMIN SERPL-MCNC: 3.3 G/DL (ref 3.5–5.1)
ALP BLD-CCNC: 154 U/L (ref 38–126)
ALT SERPL-CCNC: 39 U/L (ref 11–66)
AMPHETAMINE+METHAMPHETAMINE URINE SCREEN: POSITIVE
ANION GAP SERPL CALCULATED.3IONS-SCNC: 13 MEQ/L (ref 8–16)
AST SERPL-CCNC: 40 U/L (ref 5–40)
BARBITURATE QUANTITATIVE URINE: NEGATIVE
BASOPHILS # BLD: 0.5 %
BASOPHILS ABSOLUTE: 0 THOU/MM3 (ref 0–0.1)
BENZODIAZEPINE QUANTITATIVE URINE: NEGATIVE
BETA-HYDROXYBUTYRATE: 0.82 MG/DL (ref 0.2–2.81)
BILIRUB SERPL-MCNC: 0.4 MG/DL (ref 0.3–1.2)
BILIRUBIN URINE: NEGATIVE
BLOOD, URINE: NEGATIVE
BUN BLDV-MCNC: 14 MG/DL (ref 7–22)
CALCIUM SERPL-MCNC: 8.3 MG/DL (ref 8.5–10.5)
CANNABINOID QUANTITATIVE URINE: NEGATIVE
CHARACTER, URINE: CLEAR
CHLORIDE BLD-SCNC: 97 MEQ/L (ref 98–111)
CO2: 22 MEQ/L (ref 23–33)
COCAINE METABOLITE QUANTITATIVE URINE: NEGATIVE
COLOR: YELLOW
CREAT SERPL-MCNC: 0.6 MG/DL (ref 0.4–1.2)
EKG ATRIAL RATE: 77 BPM
EKG P AXIS: 9 DEGREES
EKG P-R INTERVAL: 130 MS
EKG Q-T INTERVAL: 402 MS
EKG QRS DURATION: 106 MS
EKG QTC CALCULATION (BAZETT): 454 MS
EKG R AXIS: -51 DEGREES
EKG T AXIS: 79 DEGREES
EKG VENTRICULAR RATE: 77 BPM
EOSINOPHIL # BLD: 2.6 %
EOSINOPHILS ABSOLUTE: 0.2 THOU/MM3 (ref 0–0.4)
ERYTHROCYTE [DISTWIDTH] IN BLOOD BY AUTOMATED COUNT: 12.3 % (ref 11.5–14.5)
ERYTHROCYTE [DISTWIDTH] IN BLOOD BY AUTOMATED COUNT: 42.2 FL (ref 35–45)
GFR SERPL CREATININE-BSD FRML MDRD: > 90 ML/MIN/1.73M2
GLUCOSE BLD-MCNC: 353 MG/DL (ref 70–108)
GLUCOSE BLD-MCNC: 388 MG/DL (ref 70–108)
GLUCOSE URINE: >= 1000 MG/DL
HCT VFR BLD CALC: 41.7 % (ref 37–47)
HEMOGLOBIN: 13.5 GM/DL (ref 12–16)
IMMATURE GRANS (ABS): 0.01 THOU/MM3 (ref 0–0.07)
IMMATURE GRANULOCYTES: 0.1 %
KETONES, URINE: NEGATIVE
LEUKOCYTE ESTERASE, URINE: NEGATIVE
LIPASE: 37 U/L (ref 5.6–51.3)
LYMPHOCYTES # BLD: 29.6 %
LYMPHOCYTES ABSOLUTE: 2.6 THOU/MM3 (ref 1–4.8)
MCH RBC QN AUTO: 30.3 PG (ref 26–33)
MCHC RBC AUTO-ENTMCNC: 32.4 GM/DL (ref 32.2–35.5)
MCV RBC AUTO: 93.5 FL (ref 81–99)
MONOCYTES # BLD: 5.8 %
MONOCYTES ABSOLUTE: 0.5 THOU/MM3 (ref 0.4–1.3)
NITRITE, URINE: NEGATIVE
NUCLEATED RED BLOOD CELLS: 0 /100 WBC
OPIATES, URINE: NEGATIVE
OSMOLALITY CALCULATION: 281.1 MOSMOL/KG (ref 275–300)
OXYCODONE: NEGATIVE
PH UA: 6.5 (ref 5–9)
PHENCYCLIDINE QUANTITATIVE URINE: NEGATIVE
PLATELET # BLD: 220 THOU/MM3 (ref 130–400)
PMV BLD AUTO: 9.7 FL (ref 9.4–12.4)
POTASSIUM SERPL-SCNC: 4 MEQ/L (ref 3.5–5.2)
PROTEIN UA: NEGATIVE
RBC # BLD: 4.46 MILL/MM3 (ref 4.2–5.4)
SEG NEUTROPHILS: 61.4 %
SEGMENTED NEUTROPHILS ABSOLUTE COUNT: 5.5 THOU/MM3 (ref 1.8–7.7)
SODIUM BLD-SCNC: 132 MEQ/L (ref 135–145)
SPECIFIC GRAVITY, URINE: > 1.03 (ref 1–1.03)
TOTAL PROTEIN: 6.3 G/DL (ref 6.1–8)
TROPONIN T: < 0.01 NG/ML
UROBILINOGEN, URINE: 0.2 EU/DL (ref 0–1)
WBC # BLD: 8.9 THOU/MM3 (ref 4.8–10.8)

## 2020-08-12 PROCEDURE — 81003 URINALYSIS AUTO W/O SCOPE: CPT

## 2020-08-12 PROCEDURE — 82948 REAGENT STRIP/BLOOD GLUCOSE: CPT

## 2020-08-12 PROCEDURE — 80307 DRUG TEST PRSMV CHEM ANLYZR: CPT

## 2020-08-12 PROCEDURE — 36415 COLL VENOUS BLD VENIPUNCTURE: CPT

## 2020-08-12 PROCEDURE — 99284 EMERGENCY DEPT VISIT MOD MDM: CPT

## 2020-08-12 PROCEDURE — 93005 ELECTROCARDIOGRAM TRACING: CPT | Performed by: EMERGENCY MEDICINE

## 2020-08-12 PROCEDURE — 85025 COMPLETE CBC W/AUTO DIFF WBC: CPT

## 2020-08-12 PROCEDURE — 80053 COMPREHEN METABOLIC PANEL: CPT

## 2020-08-12 PROCEDURE — 99285 EMERGENCY DEPT VISIT HI MDM: CPT

## 2020-08-12 PROCEDURE — 82010 KETONE BODYS QUAN: CPT

## 2020-08-12 PROCEDURE — 2580000003 HC RX 258: Performed by: EMERGENCY MEDICINE

## 2020-08-12 PROCEDURE — 84484 ASSAY OF TROPONIN QUANT: CPT

## 2020-08-12 PROCEDURE — 96360 HYDRATION IV INFUSION INIT: CPT

## 2020-08-12 PROCEDURE — 83690 ASSAY OF LIPASE: CPT

## 2020-08-12 RX ORDER — SODIUM CHLORIDE 9 MG/ML
INJECTION, SOLUTION INTRAVENOUS CONTINUOUS
Status: DISCONTINUED | OUTPATIENT
Start: 2020-08-12 | End: 2020-08-12 | Stop reason: HOSPADM

## 2020-08-12 RX ORDER — 0.9 % SODIUM CHLORIDE 0.9 %
500 INTRAVENOUS SOLUTION INTRAVENOUS ONCE
Status: COMPLETED | OUTPATIENT
Start: 2020-08-12 | End: 2020-08-12

## 2020-08-12 RX ADMIN — SODIUM CHLORIDE 500 ML: 9 INJECTION, SOLUTION INTRAVENOUS at 16:39

## 2020-08-12 ASSESSMENT — ENCOUNTER SYMPTOMS
SINUS PRESSURE: 0
TROUBLE SWALLOWING: 0
VOICE CHANGE: 0
SHORTNESS OF BREATH: 1
CHEST TIGHTNESS: 0
VOMITING: 0
SORE THROAT: 0
COUGH: 0
CONSTIPATION: 0
NAUSEA: 0
WHEEZING: 0
RHINORRHEA: 0
ABDOMINAL PAIN: 0
BACK PAIN: 0
DIARRHEA: 0

## 2020-08-12 ASSESSMENT — PAIN DESCRIPTION - PAIN TYPE: TYPE: ACUTE PAIN

## 2020-08-12 ASSESSMENT — PAIN DESCRIPTION - DESCRIPTORS: DESCRIPTORS: ACHING

## 2020-08-12 ASSESSMENT — PAIN SCALES - GENERAL: PAINLEVEL_OUTOF10: 5

## 2020-08-12 ASSESSMENT — PAIN DESCRIPTION - LOCATION: LOCATION: OTHER (COMMENT)

## 2020-08-12 NOTE — ED TRIAGE NOTES
Pt presents to ED c/c \"dizzy and feels like she is going to pass out\", bilateral LE edema, and hyperglycemia. Pt states she was recently discharged from [facility] after being admitted for 8 days due to hyperglycemia. She states while she was here she was also taken off of her Torsemide and Metoprolol medications due to positive orthostatic hypotension. Pt states now she feels dizzy all the time \"like the room is spinning\". Pt has hx of vertigo. Pt denies chest pain. Denies sob.

## 2020-08-12 NOTE — ED PROVIDER NOTES
depression, Chronic diastolic CHF (congestive heart failure) ([facility] Diabetes mellitus, insulin dependent (IDDM), uncontrolled ([facility] Diabetic polyneuropathy ([facility] Fibromyalgia, Gastroesophageal reflux disease, Hepatitis C, Heroin abuse ([facility] Herpes, Kidney stone, Methamphetamine abuse ([facility] Moderate episode of recurrent major depressive disorder ([facility] Morbid obesity with BMI of 40.0-44.9, adult ([facility] Polysubstance abuse ([facility] S/P CABG x 1, Transaminitis, and Vertigo. SURGICAL HISTORY   has a past surgical history that includes Coronary artery bypass graft;  section; and Kidney stone surgery. CURRENT MEDICATIONS    Discharge Medication List as of 2020  6:55 PM      CONTINUE these medications which have NOT CHANGED    Details   Melatonin 10 MG TABS Take 10 mg by mouth nightly as needed (sleep)Historical Med      insulin lispro (ADMELOG) 100 UNIT/ML injection vial Inject 15 Units into the skin 3 times daily (before meals) \"per sliding scale\" according to patientHistorical Med      metoprolol tartrate (LOPRESSOR) 25 MG tablet Take 1 tablet by mouth 2 times daily, Disp-60 tablet, R-3Normal      Qwaloe-KuWrq-UsZsmy-FA-Omega (MULTIVITAMIN/MINERALS PO) Take 1 tablet by mouth dailyHistorical Med      acetaminophen (TYLENOL) 325 MG tablet Take 650 mg by mouth every 4 hours as neededHistorical Med      aspirin 81 MG chewable tablet Take 81 mg by mouth dailyHistorical Med      atomoxetine (STRATTERA) 25 MG capsule Take 25 mg by mouth dailyHistorical Med      atorvastatin (LIPITOR) 20 MG tablet Take 1 tablet by mouth nightlyHistorical Med      divalproex (DEPAKOTE) 125 MG DR tablet Take 1 tablet by mouth daily , [id number] Med      TRULICITY 8.74 GREENE/3.9AZ SOPN Inject 1 pen into the skin every 7 days On , R-3, DAWHistorical Med      gabapentin (NEURONTIN) 300 MG capsule Take 300 mg by mouth 3 times daily. Historical Med      insulin glargine (BASAGLAR KWIKPEN) 100 UNIT/ML injection pen Inject 25 Units into the skin 2 times daily Historical Med      lansoprazole (PREVACID) 15 MG delayed release capsule Take 15 mg by mouth every morning (before breakfast)Historical Med      levothyroxine (SYNTHROID) 50 MCG tablet Take 50 mcg by mouth every morning (before breakfast)Historical Med      meclizine (ANTIVERT) 12.5 MG tablet Take 12.5 mg by mouth every morning (before breakfast)Historical Med      minocycline (MINOCIN;DYNACIN) 100 MG capsule Take 1 capsule by mouth 2 times daily, R-2Historical Med      mirtazapine (REMERON) 15 MG tablet Take 1 tablet by mouth nightly, R-1Historical Med      OLANZapine (ZYPREXA) 5 MG tablet Take 1 tablet by mouth nightly, R-0Historical Med      ondansetron (ZOFRAN-ODT) 4 MG disintegrating tablet Take 4 mg by mouth every 6 hours as neededHistorical Med      venlafaxine (EFFEXOR XR) 75 MG extended release capsule Take 1 capsule by mouth daily, R-0Historical Med             ALLERGIES    is allergic to meperidine; meperidine hcl; nitroglycerin; pregabalin; and promethazine. FAMILY HISTORY    has no family status information on file. family history is not on file. SOCIAL HISTORY     reports that she has been smoking. She has never used smokeless tobacco. She reports previous alcohol use. She reports current drug use. Drug: Methamphetamines. PHYSICAL EXAM      INITIAL VITALS: /62   Pulse 79   Temp 98 °F (36.7 °C) (Oral)   Resp 18   Wt 224 lb (101.6 kg)   LMP 01/01/2020 (Approximate)   SpO2 97%   BMI 37.28 kg/m² Estimated body mass index is 37.28 kg/m² as calculated from the following:    Height as of 7/26/20: 5' 5\" (1.651 m). Weight as of this encounter: 224 lb (101.6 kg). Physical Exam  Vitals signs reviewed. Constitutional:       Appearance: She is well-developed. HENT:      Head: Normocephalic and atraumatic. Right Ear: External ear normal.      Left Ear: External ear normal.      Nose: Nose normal.   Eyes:      General: No scleral icterus. Conjunctiva/sclera: Conjunctivae normal.      Pupils: Pupils are equal, round, and reactive to light. Neck:      Musculoskeletal: Normal range of motion and neck supple. Thyroid: No thyromegaly. Vascular: No JVD. Cardiovascular:      Rate and Rhythm: Normal rate and regular rhythm. Heart sounds: No murmur. No friction rub. Pulmonary:      Effort: Pulmonary effort is normal.      Breath sounds: Normal breath sounds. No wheezing or rales. Chest:      Chest wall: No tenderness. Abdominal:      General: Bowel sounds are normal.      Palpations: Abdomen is soft. There is no mass. Tenderness: There is no abdominal tenderness. Lymphadenopathy:      Cervical: No cervical adenopathy. Skin:     Findings: No rash. Neurological:      Mental Status: She is alert and oriented to person, place, and time. Psychiatric:         Behavior: Behavior is cooperative.          MEDICAL DECISION MAKING    DIFFERENTIAL DIAGNOSIS:  Hyperglycemia rule out DKA, urinary tract infection, electrolyte or metabolic disorder      DIAGNOSTIC RESULTS    RADIOLOGY:    No orders to display       LABS:   Labs Reviewed   URINE RT REFLEX TO CULTURE - Abnormal; Notable for the following components:       Result Value    Glucose, Ur >= 1000 (*)     Specific Gravity, Urine > 1.030 (*)     All other components within normal limits   COMPREHENSIVE METABOLIC PANEL - Abnormal; Notable for the following components:    Glucose 388 (*)     Sodium 132 (*)     Chloride 97 (*)     CO2 22 (*)     Calcium 8.3 (*)     Alkaline Phosphatase 154 (*)     Alb 3.3 (*)     All other components within normal limits   POCT GLUCOSE - Abnormal; Notable for the following components:    POC Glucose 353 (*)     All other components within normal limits   URINE DRUG SCREEN   CBC WITH AUTO DIFFERENTIAL   TROPONIN   LIPASE   BETA-HYDROXYBUTYRATE   ANION GAP   GLOMERULAR FILTRATION RATE, ESTIMATED   OSMOLALITY     All other unresulted laboratory test above are normal:    Vitals:    Vitals:    08/12/20 1514 08/12/20 1630 08/12/20 1746   BP: (!) 157/61 (!) 121/56 132/62   Pulse: 81 77 79   Resp: 18 15 18   Temp: 98 °F (36.7 °C)     TempSrc: Oral     SpO2: 97% 98% 97%   Weight:  224 lb (101.6 kg)        EMERGENCY DEPARTMENT COURSE:    Medications   0.9 % sodium chloride bolus (0 mLs Intravenous Stopped 8/12/20 1739)       The pt was seen and evaluated by me. Within the department, I observed the pt's vitalsigns to be within acceptable range. Laboratory studies were performed, results were reviewed with the patient. Within the department, the pt was treated with IV fluid hydration. I observed the pt's condition to be hemodynamically stable during the duration of their stay. I explained my proposed course of treatment to the pt, and they were amenable to my decision. They were discharged home, and they will return to the ED if their symptoms become more severein nature, or otherwise change. CRITICAL CARE:   None. CONSULTS:  None    PROCEDURES:  None. FINAL IMPRESSION       1. Uncontrolled type 2 diabetes mellitus with hyperglycemia ([facility]          DISPOSITION/PLAN  PATIENT REFERRED TO:  Rupali Lemos, APRN - CNP  [address] [place] [address]  [phone]    Schedule an appointment as soon as possible for a visit in 3 days      DISCHARGE MEDICATIONS:  Discharge Medication List as of 8/12/2020  6:55 PM            (Please note that portions of this note were completed with a voice recognition program and electronically transcribed. Efforts were [facility] edit the dictations but occasionally words are mis-transcribed . The transcription may contain errors not detected in proofreading.   This transcription was electronically signed.)     08/14/20 9:44 AM      Casper Nava MD      Emergency room physician           Casper Nava MD  08/14/20 2875

## 2020-08-13 ENCOUNTER — CARE COORDINATION (OUTPATIENT)
Dept: CARE COORDINATION | Age: 50
End: 2020-08-13

## 2020-08-13 PROCEDURE — 93010 ELECTROCARDIOGRAM REPORT: CPT | Performed by: INTERNAL MEDICINE

## 2020-08-13 NOTE — CARE COORDINATION
Attempted to reach Hortencia Zimmer today [facility]. No answer. Mailbox full.

## 2020-08-15 NOTE — PROGRESS NOTES
Patient is a 52year old from [facility] with Dr Josiane Johansen transferring. Patient has had a recent admit here7/26/2020 to 8/4/2020 and an ED visit 8/12/2020 for recurrent episodes of syncope. Hx DM, Hep C, MI, CAD, NSTEMI, CABG, meth abuse, cocaine abuse, obesity. Dr Floridalma Hess discharge dictation said \"Cardiac W/U non-contributory. Orthosis resolved. Pt still feels dizzy, PT/OT to see. Otherwise, will plan for BP med/diuretic regimen modification and 30-day Event monitor at discharge\". In the ED on 8/12 she was positive for amphetamines. She presented to [facility] [place] for syncope. States she felt dizzy and then woke up on the floor. BP in the ED was as low as 77/53, not tachy. Last /65, HR 78, RR 16, SPO2 100% RA, afebrile. Troponin normal. EKG reported as NSR. Did not do CT head since she has had an extensive workup here recently. Did c/o lower abdominal pain so CT abd/pelvis done and reportedly unremarkable. UA (-). CXR normal. Lactate pending. WBC 19.1. K+ 2.9. Given Zosyn and Vanco and replacing K+. Dr Josiane Johansen states that she was diagnosed with Autonomic Nervous System dysfunction but he is unsure why she was sent home without midodrine. Patient will go to a Stepdown bed under Dr Marcos Malone as an inpatient with the dx of syncope and collapse.

## 2020-08-16 ENCOUNTER — HOSPITAL ENCOUNTER (INPATIENT)
Age: 50
LOS: 2 days | Discharge: HOME HEALTH | DRG: 204 | End: 2020-08-18
Attending: INTERNAL MEDICINE | Admitting: INTERNAL MEDICINE
Payer: MEDICAID

## 2020-08-16 LAB
AVERAGE GLUCOSE: 285 MG/DL (ref 70–126)
GLUCOSE BLD-MCNC: 229 MG/DL (ref 70–108)
HBA1C MFR BLD: 11.5 % (ref 4.4–6.4)

## 2020-08-16 PROCEDURE — G0379 DIRECT REFER HOSPITAL OBSERV: HCPCS

## 2020-08-16 PROCEDURE — 36415 COLL VENOUS BLD VENIPUNCTURE: CPT

## 2020-08-16 PROCEDURE — 99223 1ST HOSP IP/OBS HIGH 75: CPT | Performed by: INTERNAL MEDICINE

## 2020-08-16 PROCEDURE — 2060000000 HC ICU INTERMEDIATE R&B

## 2020-08-16 PROCEDURE — 2580000003 HC RX 258: Performed by: INTERNAL MEDICINE

## 2020-08-16 PROCEDURE — 6370000000 HC RX 637 (ALT 250 FOR IP): Performed by: INTERNAL MEDICINE

## 2020-08-16 PROCEDURE — G0378 HOSPITAL OBSERVATION PER HR: HCPCS

## 2020-08-16 PROCEDURE — 96372 THER/PROPH/DIAG INJ SC/IM: CPT

## 2020-08-16 PROCEDURE — 82948 REAGENT STRIP/BLOOD GLUCOSE: CPT

## 2020-08-16 PROCEDURE — 83036 HEMOGLOBIN GLYCOSYLATED A1C: CPT

## 2020-08-16 PROCEDURE — 6360000002 HC RX W HCPCS: Performed by: INTERNAL MEDICINE

## 2020-08-16 RX ORDER — ACETAMINOPHEN 650 MG/1
650 SUPPOSITORY RECTAL EVERY 6 HOURS PRN
Status: DISCONTINUED | OUTPATIENT
Start: 2020-08-16 | End: 2020-08-18 | Stop reason: HOSPADM

## 2020-08-16 RX ORDER — MECLIZINE HCL 12.5 MG/1
12.5 TABLET ORAL
Status: DISCONTINUED | OUTPATIENT
Start: 2020-08-17 | End: 2020-08-18 | Stop reason: HOSPADM

## 2020-08-16 RX ORDER — SODIUM CHLORIDE, SODIUM LACTATE, POTASSIUM CHLORIDE, CALCIUM CHLORIDE 600; 310; 30; 20 MG/100ML; MG/100ML; MG/100ML; MG/100ML
INJECTION, SOLUTION INTRAVENOUS CONTINUOUS
Status: DISCONTINUED | OUTPATIENT
Start: 2020-08-16 | End: 2020-08-17

## 2020-08-16 RX ORDER — SODIUM CHLORIDE 0.9 % (FLUSH) 0.9 %
10 SYRINGE (ML) INJECTION EVERY 12 HOURS SCHEDULED
Status: DISCONTINUED | OUTPATIENT
Start: 2020-08-16 | End: 2020-08-18 | Stop reason: HOSPADM

## 2020-08-16 RX ORDER — NICOTINE POLACRILEX 4 MG
15 LOZENGE BUCCAL PRN
Status: DISCONTINUED | OUTPATIENT
Start: 2020-08-16 | End: 2020-08-18 | Stop reason: HOSPADM

## 2020-08-16 RX ORDER — INSULIN GLARGINE 100 [IU]/ML
25 INJECTION, SOLUTION SUBCUTANEOUS 2 TIMES DAILY
Status: DISCONTINUED | OUTPATIENT
Start: 2020-08-16 | End: 2020-08-18 | Stop reason: HOSPADM

## 2020-08-16 RX ORDER — PROMETHAZINE HYDROCHLORIDE 25 MG/1
12.5 TABLET ORAL EVERY 6 HOURS PRN
Status: DISCONTINUED | OUTPATIENT
Start: 2020-08-16 | End: 2020-08-18 | Stop reason: HOSPADM

## 2020-08-16 RX ORDER — DIVALPROEX SODIUM 125 MG/1
125 CAPSULE, COATED PELLETS ORAL DAILY
Status: DISCONTINUED | OUTPATIENT
Start: 2020-08-17 | End: 2020-08-17

## 2020-08-16 RX ORDER — ASPIRIN 81 MG/1
81 TABLET, CHEWABLE ORAL DAILY
Status: DISCONTINUED | OUTPATIENT
Start: 2020-08-17 | End: 2020-08-18 | Stop reason: HOSPADM

## 2020-08-16 RX ORDER — LIDOCAINE 4 G/G
1 PATCH TOPICAL DAILY
Status: DISCONTINUED | OUTPATIENT
Start: 2020-08-16 | End: 2020-08-18 | Stop reason: HOSPADM

## 2020-08-16 RX ORDER — VENLAFAXINE HYDROCHLORIDE 75 MG/1
75 CAPSULE, EXTENDED RELEASE ORAL DAILY
Status: DISCONTINUED | OUTPATIENT
Start: 2020-08-17 | End: 2020-08-18 | Stop reason: HOSPADM

## 2020-08-16 RX ORDER — OLANZAPINE 5 MG/1
5 TABLET ORAL NIGHTLY
Status: DISCONTINUED | OUTPATIENT
Start: 2020-08-16 | End: 2020-08-18 | Stop reason: HOSPADM

## 2020-08-16 RX ORDER — ACETAMINOPHEN 325 MG/1
650 TABLET ORAL EVERY 6 HOURS PRN
Status: DISCONTINUED | OUTPATIENT
Start: 2020-08-16 | End: 2020-08-18 | Stop reason: HOSPADM

## 2020-08-16 RX ORDER — DEXTROSE MONOHYDRATE 50 MG/ML
100 INJECTION, SOLUTION INTRAVENOUS PRN
Status: DISCONTINUED | OUTPATIENT
Start: 2020-08-16 | End: 2020-08-18 | Stop reason: HOSPADM

## 2020-08-16 RX ORDER — PANTOPRAZOLE SODIUM 40 MG/1
40 TABLET, DELAYED RELEASE ORAL
Status: DISCONTINUED | OUTPATIENT
Start: 2020-08-17 | End: 2020-08-18 | Stop reason: HOSPADM

## 2020-08-16 RX ORDER — POLYETHYLENE GLYCOL 3350 17 G/17G
17 POWDER, FOR SOLUTION ORAL DAILY PRN
Status: DISCONTINUED | OUTPATIENT
Start: 2020-08-16 | End: 2020-08-18 | Stop reason: HOSPADM

## 2020-08-16 RX ORDER — GABAPENTIN 300 MG/1
300 CAPSULE ORAL 3 TIMES DAILY
Status: DISCONTINUED | OUTPATIENT
Start: 2020-08-16 | End: 2020-08-18 | Stop reason: HOSPADM

## 2020-08-16 RX ORDER — ATORVASTATIN CALCIUM 20 MG/1
20 TABLET, FILM COATED ORAL NIGHTLY
Status: DISCONTINUED | OUTPATIENT
Start: 2020-08-17 | End: 2020-08-18 | Stop reason: HOSPADM

## 2020-08-16 RX ORDER — SODIUM CHLORIDE 0.9 % (FLUSH) 0.9 %
10 SYRINGE (ML) INJECTION PRN
Status: DISCONTINUED | OUTPATIENT
Start: 2020-08-16 | End: 2020-08-18 | Stop reason: HOSPADM

## 2020-08-16 RX ORDER — ONDANSETRON 2 MG/ML
4 INJECTION INTRAMUSCULAR; INTRAVENOUS EVERY 6 HOURS PRN
Status: DISCONTINUED | OUTPATIENT
Start: 2020-08-16 | End: 2020-08-18 | Stop reason: HOSPADM

## 2020-08-16 RX ORDER — DEXTROSE MONOHYDRATE 25 G/50ML
12.5 INJECTION, SOLUTION INTRAVENOUS PRN
Status: DISCONTINUED | OUTPATIENT
Start: 2020-08-16 | End: 2020-08-18 | Stop reason: HOSPADM

## 2020-08-16 RX ORDER — MIRTAZAPINE 15 MG/1
15 TABLET, FILM COATED ORAL NIGHTLY
Status: DISCONTINUED | OUTPATIENT
Start: 2020-08-16 | End: 2020-08-18 | Stop reason: HOSPADM

## 2020-08-16 RX ORDER — LEVOTHYROXINE SODIUM 0.05 MG/1
50 TABLET ORAL
Status: DISCONTINUED | OUTPATIENT
Start: 2020-08-17 | End: 2020-08-18 | Stop reason: HOSPADM

## 2020-08-16 RX ADMIN — INSULIN LISPRO 2 UNITS: 100 INJECTION, SOLUTION INTRAVENOUS; SUBCUTANEOUS at 21:35

## 2020-08-16 RX ADMIN — ENOXAPARIN SODIUM 40 MG: 40 INJECTION SUBCUTANEOUS at 21:34

## 2020-08-16 RX ADMIN — Medication 10 ML: at 21:33

## 2020-08-16 RX ADMIN — ACETAMINOPHEN 650 MG: 325 TABLET ORAL at 23:13

## 2020-08-16 RX ADMIN — INSULIN GLARGINE 25 UNITS: 100 INJECTION, SOLUTION SUBCUTANEOUS at 21:34

## 2020-08-16 RX ADMIN — GABAPENTIN 300 MG: 300 CAPSULE ORAL at 21:34

## 2020-08-16 RX ADMIN — SODIUM CHLORIDE, POTASSIUM CHLORIDE, SODIUM LACTATE AND CALCIUM CHLORIDE: 600; 310; 30; 20 INJECTION, SOLUTION INTRAVENOUS at 21:40

## 2020-08-16 RX ADMIN — OLANZAPINE 5 MG: 5 TABLET, FILM COATED ORAL at 21:34

## 2020-08-16 RX ADMIN — MIRTAZAPINE 15 MG: 15 TABLET, FILM COATED ORAL at 21:34

## 2020-08-16 ASSESSMENT — PAIN SCALES - GENERAL
PAINLEVEL_OUTOF10: 0
PAINLEVEL_OUTOF10: 3
PAINLEVEL_OUTOF10: 6

## 2020-08-16 ASSESSMENT — PAIN DESCRIPTION - PAIN TYPE: TYPE: ACUTE PAIN

## 2020-08-16 ASSESSMENT — PAIN DESCRIPTION - LOCATION: LOCATION: ANKLE

## 2020-08-16 NOTE — H&P
Assessment and Plan:        Syncope and collapse: Patient was recently admitted for this issue and extensive cardiac work-up was noncontributory. Patient is orthostatic on exam.  -Telemetry  -Consult cardiology  -IV fluids  -Every shift orthostatics  -Hold metoprolol    Type 2 diabetes uncontrolled: Continue Lantus and sliding scale insulin. Carb controlled diet    Recent methamphetamine abuse relapse: Patient states that she has not used since that last time. Leukocytosis: Patient does not have any fevers or chills or cough or dysuria. Will hold further antibiotics and continue to monitor there is likely related to acute distress of syncope. History of polysubstance abuse: Patient states that she is currently not using any substances but admits to using methamphetamine and cocaine in the past..  She states that she broke her ankle sometime recently and is being followed outpatient. She is requesting pain medicine will try to utilize nonnarcotic methods at this time. Hypothyroidism: Continue Synthroid    Hypertension: Given the above presentation will hold antihypertensives at this time    History of depression: Continue home medications    Obesity: Continue with lifestyle modifications    CC: Syncope  HPI: (12 point review of systems completed. Pertinent positives noted. Otherwise ROS is negative) : Patient is a 59-year-old female with a past medical history of type 2 diabetes on insulin, recurrent syncopal episodes, CKD, polysubstance abuse, hepatitis C, hypothyroidism, hypertension, obesity. She was recently admitted for a syncopal episode and it was thought to be due to orthostatic hypotension. Consideration was given to polypharmacy and dysautonomia plus or minus substance abuse. She underwent CT imaging and CTA of the chest wall as well as CTA of the neck she was found to have an EF of 50 to 55% on echocardiogram.  Ultimately extensive cardiac work-up was noncontributory.   She was given IV flush  10 mL Intravenous 2 times per day    enoxaparin  40 mg Subcutaneous Q24H    [START ON 8/17/2020] insulin lispro  0-12 Units Subcutaneous TID WC    insulin lispro  0-6 Units Subcutaneous Nightly       Vital Signs:   /73   Pulse 76   Temp 97.5 °F (36.4 °C) (Oral)   Resp 18   Ht 5' 5\" (1.651 m)   Wt 219 lb (99.3 kg)   LMP 01/01/2020 (Approximate)   SpO2 94%   BMI 36.44 kg/m²    No intake or output data in the 24 hours ending 08/16/20 1904     General:   Resting in bed  HEENT:  normocephalic and atraumatic. No scleral icterus. PERR. Neck: supple. No JVD. No thyromegaly. Lungs: clear to auscultation. No retractions  Cardiac: RRR without murmur. Abdomen: soft. Nontender. Bowel sounds positive. Extremities:  No clubbing, cyanosis, or edema x 4. Vasculature: capillary refill < 3 seconds. Palpable LE pulses bilaterally. Skin:  warm and dry. Psych:  Alert and oriented x3. Affect appropriate  Lymph:  No supraclavicular adenopathy. Neurologic:  No focal deficit. No seizures. Data: (All radiographs, tracings, PFTs, and imaging are personally viewed and interpreted unless otherwise noted).  No results found for this or any previous visit (from the past 24 hour(s)).    No orders to display      Outside labs reviewed: labs from August 16 include creatinine 1.37, sodium 131, potassium 3.5, chloride 96 glucose on last check was 331. She did have a leukocytosis of 19 on admission which came down to 10.5 today. Hemoglobin is 14, platelets are 541,757. UA had glucosuria, however, leukocyte esterase and nitrates were negative there was trace bacteria in the urine.     Electronically signed by Froilan Patel MD on 8/16/2020 at 7:04 PM

## 2020-08-17 LAB
ANION GAP SERPL CALCULATED.3IONS-SCNC: 8 MEQ/L (ref 8–16)
BUN BLDV-MCNC: 21 MG/DL (ref 7–22)
CALCIUM SERPL-MCNC: 8.8 MG/DL (ref 8.5–10.5)
CHLORIDE BLD-SCNC: 100 MEQ/L (ref 98–111)
CO2: 27 MEQ/L (ref 23–33)
CREAT SERPL-MCNC: 0.8 MG/DL (ref 0.4–1.2)
ERYTHROCYTE [DISTWIDTH] IN BLOOD BY AUTOMATED COUNT: 12.3 % (ref 11.5–14.5)
ERYTHROCYTE [DISTWIDTH] IN BLOOD BY AUTOMATED COUNT: 40.6 FL (ref 35–45)
GFR SERPL CREATININE-BSD FRML MDRD: 76 ML/MIN/1.73M2
GLUCOSE BLD-MCNC: 138 MG/DL (ref 70–108)
GLUCOSE BLD-MCNC: 157 MG/DL (ref 70–108)
GLUCOSE BLD-MCNC: 229 MG/DL (ref 70–108)
GLUCOSE BLD-MCNC: 229 MG/DL (ref 70–108)
GLUCOSE BLD-MCNC: 230 MG/DL (ref 70–108)
HCT VFR BLD CALC: 40.8 % (ref 37–47)
HEMOGLOBIN: 13.6 GM/DL (ref 12–16)
MCH RBC QN AUTO: 29.8 PG (ref 26–33)
MCHC RBC AUTO-ENTMCNC: 33.3 GM/DL (ref 32.2–35.5)
MCV RBC AUTO: 89.5 FL (ref 81–99)
PLATELET # BLD: 226 THOU/MM3 (ref 130–400)
PMV BLD AUTO: 9.6 FL (ref 9.4–12.4)
POTASSIUM REFLEX MAGNESIUM: 4.1 MEQ/L (ref 3.5–5.2)
RBC # BLD: 4.56 MILL/MM3 (ref 4.2–5.4)
SODIUM BLD-SCNC: 135 MEQ/L (ref 135–145)
WBC # BLD: 7.2 THOU/MM3 (ref 4.8–10.8)

## 2020-08-17 PROCEDURE — 96372 THER/PROPH/DIAG INJ SC/IM: CPT

## 2020-08-17 PROCEDURE — 2580000003 HC RX 258: Performed by: INTERNAL MEDICINE

## 2020-08-17 PROCEDURE — 6360000002 HC RX W HCPCS: Performed by: INTERNAL MEDICINE

## 2020-08-17 PROCEDURE — 6370000000 HC RX 637 (ALT 250 FOR IP): Performed by: HOSPITALIST

## 2020-08-17 PROCEDURE — 82948 REAGENT STRIP/BLOOD GLUCOSE: CPT

## 2020-08-17 PROCEDURE — 97162 PT EVAL MOD COMPLEX 30 MIN: CPT

## 2020-08-17 PROCEDURE — 99233 SBSQ HOSP IP/OBS HIGH 50: CPT | Performed by: HOSPITALIST

## 2020-08-17 PROCEDURE — G0378 HOSPITAL OBSERVATION PER HR: HCPCS

## 2020-08-17 PROCEDURE — 36415 COLL VENOUS BLD VENIPUNCTURE: CPT

## 2020-08-17 PROCEDURE — 85027 COMPLETE CBC AUTOMATED: CPT

## 2020-08-17 PROCEDURE — 99222 1ST HOSP IP/OBS MODERATE 55: CPT | Performed by: NUCLEAR MEDICINE

## 2020-08-17 PROCEDURE — 6370000000 HC RX 637 (ALT 250 FOR IP): Performed by: STUDENT IN AN ORGANIZED HEALTH CARE EDUCATION/TRAINING PROGRAM

## 2020-08-17 PROCEDURE — 2060000000 HC ICU INTERMEDIATE R&B

## 2020-08-17 PROCEDURE — 6370000000 HC RX 637 (ALT 250 FOR IP): Performed by: INTERNAL MEDICINE

## 2020-08-17 PROCEDURE — 80048 BASIC METABOLIC PNL TOTAL CA: CPT

## 2020-08-17 PROCEDURE — 97112 NEUROMUSCULAR REEDUCATION: CPT

## 2020-08-17 PROCEDURE — 97530 THERAPEUTIC ACTIVITIES: CPT

## 2020-08-17 PROCEDURE — 94761 N-INVAS EAR/PLS OXIMETRY MLT: CPT

## 2020-08-17 PROCEDURE — 96361 HYDRATE IV INFUSION ADD-ON: CPT

## 2020-08-17 RX ORDER — CLOTRIMAZOLE 1 %
CREAM (GRAM) TOPICAL 2 TIMES DAILY
Status: DISCONTINUED | OUTPATIENT
Start: 2020-08-17 | End: 2020-08-18 | Stop reason: HOSPADM

## 2020-08-17 RX ORDER — LOSARTAN POTASSIUM 25 MG/1
25 TABLET ORAL DAILY
Status: ON HOLD | COMMUNITY
End: 2020-08-18 | Stop reason: HOSPADM

## 2020-08-17 RX ORDER — DIVALPROEX SODIUM 125 MG/1
125 CAPSULE, COATED PELLETS ORAL 2 TIMES DAILY
Status: DISCONTINUED | OUTPATIENT
Start: 2020-08-17 | End: 2020-08-18 | Stop reason: HOSPADM

## 2020-08-17 RX ADMIN — PANTOPRAZOLE SODIUM 40 MG: 40 TABLET, DELAYED RELEASE ORAL at 05:30

## 2020-08-17 RX ADMIN — VENLAFAXINE HYDROCHLORIDE 75 MG: 75 CAPSULE, EXTENDED RELEASE ORAL at 08:49

## 2020-08-17 RX ADMIN — INSULIN GLARGINE 25 UNITS: 100 INJECTION, SOLUTION SUBCUTANEOUS at 08:53

## 2020-08-17 RX ADMIN — DIVALPROEX SODIUM 125 MG: 125 CAPSULE ORAL at 20:28

## 2020-08-17 RX ADMIN — Medication 10 ML: at 08:49

## 2020-08-17 RX ADMIN — CLOTRIMAZOLE: 10 CREAM TOPICAL at 20:28

## 2020-08-17 RX ADMIN — OLANZAPINE 5 MG: 5 TABLET, FILM COATED ORAL at 20:28

## 2020-08-17 RX ADMIN — SODIUM CHLORIDE, POTASSIUM CHLORIDE, SODIUM LACTATE AND CALCIUM CHLORIDE: 600; 310; 30; 20 INJECTION, SOLUTION INTRAVENOUS at 06:36

## 2020-08-17 RX ADMIN — CLOTRIMAZOLE: 10 CREAM TOPICAL at 15:52

## 2020-08-17 RX ADMIN — INSULIN LISPRO 2 UNITS: 100 INJECTION, SOLUTION INTRAVENOUS; SUBCUTANEOUS at 20:29

## 2020-08-17 RX ADMIN — ACETAMINOPHEN 650 MG: 325 TABLET ORAL at 08:48

## 2020-08-17 RX ADMIN — ENOXAPARIN SODIUM 40 MG: 40 INJECTION SUBCUTANEOUS at 20:27

## 2020-08-17 RX ADMIN — GABAPENTIN 300 MG: 300 CAPSULE ORAL at 20:29

## 2020-08-17 RX ADMIN — ATORVASTATIN CALCIUM 20 MG: 20 TABLET, FILM COATED ORAL at 20:29

## 2020-08-17 RX ADMIN — MECLIZINE HYDROCHLORIDE 12.5 MG: 12.5 TABLET, FILM COATED ORAL at 05:30

## 2020-08-17 RX ADMIN — MIRTAZAPINE 15 MG: 15 TABLET, FILM COATED ORAL at 20:28

## 2020-08-17 RX ADMIN — GABAPENTIN 300 MG: 300 CAPSULE ORAL at 15:52

## 2020-08-17 RX ADMIN — DIVALPROEX SODIUM 125 MG: 125 CAPSULE ORAL at 08:49

## 2020-08-17 RX ADMIN — GABAPENTIN 300 MG: 300 CAPSULE ORAL at 08:49

## 2020-08-17 RX ADMIN — Medication 10 ML: at 20:29

## 2020-08-17 RX ADMIN — ASPIRIN 81 MG: 81 TABLET, CHEWABLE ORAL at 08:49

## 2020-08-17 RX ADMIN — LEVOTHYROXINE SODIUM 50 MCG: 50 TABLET ORAL at 05:30

## 2020-08-17 ASSESSMENT — PAIN SCALES - GENERAL
PAINLEVEL_OUTOF10: 0
PAINLEVEL_OUTOF10: 3

## 2020-08-17 NOTE — CARE COORDINATION
8/17/20, 7:28 AM EDT  DISCHARGE PLANNING EVALUATION:    Gunnar Grijalva       Admitted from: DA 8/16/2020/ [address] [place] [address] day: 1   Location: [facility]- Reason for admit: Syncope and collapse [R55] Status: IP  Admit order signed?: yes  PMH:  has a past medical history of CAD S/P percutaneous coronary angioplasty, 3 stents, Chronic depression, Chronic diastolic CHF (congestive heart failure) ([facility] Diabetes mellitus, insulin dependent (IDDM), uncontrolled ([facility] [address] Diabetic polyneuropathy ([facility] [address] Fibromyalgia, Gastroesophageal reflux disease, Hepatitis C, Heroin abuse ([facility] [address] Herpes, Kidney stone, Methamphetamine abuse ([facility] [address] Moderate episode of recurrent major depressive disorder ([facility] [address] Morbid obesity with BMI of 40.0-44.9, adult ([facility] [address] Polysubstance abuse ([facility] [address] S/P CABG x 1, Syncope, Transaminitis, and Vertigo. Procedure: na  Pertinent abnormal Imaging:  none    Medications:  Scheduled Meds:   aspirin  81 mg Oral Daily    atorvastatin  20 mg Oral Nightly    divalproex  125 mg Oral Daily    gabapentin  300 mg Oral TID    insulin glargine  25 Units Subcutaneous BID    pantoprazole  40 mg Oral QAM AC    levothyroxine  50 mcg Oral QAM AC    meclizine  12.5 mg Oral QAM AC    mirtazapine  15 mg Oral Nightly    OLANZapine  5 mg Oral Nightly    venlafaxine  75 mg Oral Daily    sodium chloride flush  10 mL Intravenous 2 times per day    enoxaparin  40 mg Subcutaneous Q24H    insulin lispro  0-12 Units Subcutaneous TID WC    insulin lispro  0-6 Units Subcutaneous Nightly    lidocaine  1 patch Transdermal Daily     Continuous Infusions:   dextrose      lactated ringers 125 mL/hr at 08/17/20 0636      Pertinent Info/Orders/Treatment Plan:    Transfer [address] [place] [address] ED with Dr Jacobo Harris transferring for recurrent episodes of syncope      Telemetry, PT/OT evals, cardiology consulted, IVF, orthostatic vitals, diabetic management, lidocaine patch, pain control, lactic acid elevated, 1L fluids, Meclizine, up with help.   Diet: DIET CARB CONTROL;   Smoking status:  reports that she has been smoking. She has never used smokeless tobacco.   PCP: WAGNER Moore CNP  Readmission 30 days or less: yes  Readmission Risk Score: 29%    Discharge Planning Evaluation  Current Residence:  Private Residence  Living Arrangements:  Alone   Support Systems:  Friends/Neighbors  Current Services PTA:     Potential Assistance Needed:  N/A  Potential Assistance Purchasing Medications:  No  Does patient want to participate in local refill/ meds to beds program?  No  [facility]  Patient expects to be discharged to:  Home  Expected Discharge date:  08/19/20  Follow Up Appointment: Best Day/ Time: Tuesday PM    Patient Goals/Plan/Treatment Preferences: [place] Hercules lives home with her best friend; he works outside the home. She drives, has PCP, is current with [facility]. She has crutches to use since she broke her left foot along with 2 WW. She checks her blood sugars 3-4 times a day and declines need for other DME. Updated SW re: HH. Transportation/Food Security/Housekeeping Addressed:  No issues identified.     Evaluation: yes

## 2020-08-17 NOTE — CARE COORDINATION
08/17/20 1047   Readmission Assessment   Number of Days since last admission? 1-7 days   Previous Disposition Home with Home Health   Who is being Interviewed Patient   What was the patient's/caregiver's perception as to why they think they needed to return back to the hospital? Other (Comment)  (continued to black out/dizziness)   Did you visit your Primary Care Physician after you left the hospital, before you returned this time? No   Why weren't you able to visit your PCP? Other (Comment)  (made appt  too sick to go to appt)   Did you see a specialist, such as Cardiac, Pulmonary, Orthopedic Physician, etc. after you left the hospital? No   Who advised the patient to return to the hospital? Other (Comment)  (self, if got worse instructions stress to return)   Does the patient report anything that got in the way of taking their medications? No   In our efforts to provide the best possible care to you and others like you, can you think of anything that we could have done to help you after you left the hospital the first time, so that you might not have needed to return so soon?  Other (Comment)  (no)

## 2020-08-17 NOTE — PLAN OF CARE
Problem: Falls - Risk of:  Goal: Will remain free from falls  Description: Will remain free from falls  Outcome: Ongoing  Note: No falls this shift, call light within reach, bed in lowest position, bed alarm on. Problem: Skin Integrity:  Goal: Absence of new skin breakdown  Description: Absence of new skin breakdown  Outcome: Ongoing  Note: No new skin issues this shift. Care plan reviewed with patient. Patient verbalize understanding of the plan of care and contribute to goal setting.

## 2020-08-17 NOTE — CONSULTS
[address] [place] [address]                                  CONSULTATION    PATIENT NAME: Bernice Felty                    :        1970  MED REC NO:   [id number]                           ROOM:       0005  ACCOUNT NO:   [de-identified]                           ADMIT DATE: 2020  PROVIDER:     IVONE Rico Dakota:  2020    REASON FOR CONSULTATION:  Recurrent dizziness and syncope. REQUESTING PROVIDER:  Hospitalist Service. HISTORY OF PRESENT ILLNESS:  This is a very unfortunate 60-year-old  patient who has extensive cardiac history; history of coronary artery  disease, bypass surgery ; history of hypertension; hyperlipidemia  and longstanding diabetes who apparently has had recurrent symptoms of  dizziness, lightheadedness and syncope for the last several weeks. She  was admitted here not too long ago with similar symptoms, underwent  evaluation, had a stress test and an echocardiogram both of which did  not show any specific findings. She does have some chronic shortness of  breath; however, no significant chest pain lately. Lately she has  become pretty much chronically dizzy in different positions. Initially,  she was having issues with dizziness and syncope where she was in an  upright position, but lately it progressed into pretty much continuous  dizziness. No specific triggers, no specific associated symptoms. REVIEW OF SYSTEMS:  No fever, no chills, no weight loss. No hematuria  or dysuria. No abdominal pain, nausea, vomiting, or diarrhea. No  obvious active psych problems or suicidal ideation. No skin rashes. No  obvious dizziness, lightheadedness or loss of consciousness except what  is mentioned above the last 3 to 4 weeks. No recent trauma. No  bleeding problem. No HEENT-related problems. Diffuse arthritis. PAST MEDICAL HISTORY:  1.   Coronary artery disease, bypass surgery. 2.  Hypertension. 3.  Hyperlipidemia. 4.  Diabetes. 5.  Depression. 6.  Hypothyroidism. ALLERGIES:  MEPERIDINE, NITROGLYCERIN, PROMETHAZINE. CURRENT MEDICATIONS:  Lipitor 20 a day, aspirin 81 a day, insulin,  levothyroxine 50 a day, meclizine, Remeron, Zofran, Protonix 40 a day,  Effexor 75 a day. SOCIAL HISTORY:  Previous history of smoking. No alcohol or drug abuse. FAMILY HISTORY:  Significant for coronary artery disease. PHYSICAL EXAMINATION:  VITAL SIGNS:  Showed a blood pressure of 130/80, heart rate of 70. GENERAL APPEARANCE:  Pleasant lady, no obvious acute distress. EYES AND EARS:  No discharge. NECK:  No JVD. No bruits. No masses. LUNGS:  Decreased air entry. No crackles. No wheezes. HEART:  Normal S1, S2. Systolic murmur grade 2/6. ABDOMEN:  Soft, nontender. Positive bowel sounds. No organomegaly. EXTREMITIES:  No significant edema. NEUROLOGIC:  Gross intact. Awake, alert. No focal deficits. PSYCH:  No evidence of active psychosis. SKIN:  No rashes. LABORATORY DATA:  Showed sodium 135, potassium 4.1, BUN 21, creatinine  0.8. White count 7.2, hemoglobin 13.6, hematocrit 40.8, platelets 054. EKG showed sinus rhythm, nonspecific ST-T wave changes. IMPRESSION:  This is a patient who comes in with the above presentation,  has had steady dizziness for the last several weeks and currently she  reports that she is dizzy pretty much 24 x 7 which makes it less likely  to be cardiac; however, with her diabetes, she could potentially have  orthostatic hypotension as well. RECOMMENDATION:  As follows,  1. Consider a tilt table test to complete the evaluation. 2.  Highly recommend a neurological or psychological evaluation given  the inconsistency of the relation between the patient's symptoms and the  findings. 3.  I discussed the case with the staff taking care of the patient on  the floor.     Thank you for allowing me to participate in the care of this patient.         Burt Gracia M.D.    D: 08/17/2020 16:03:30       T: 08/17/2020 16:07:39     [email or web address]  Job#: [id number]     Doc#: [id number]    CC:

## 2020-08-17 NOTE — PROGRESS NOTES
Pharmacy Medication History Note      List of current medications patient is taking is complete. Source of information: patient     Changes made to medication list:  Medications removed (include reason, ex. therapy complete or physician discontinued):  Removed metoprolol - physician discontinued     Medications added/doses adjusted: Added losartan 25 mg     Adjusted divalproex to BID  Adjusted lansoprazole to PRN    Other notes (ex. Recent course of antibiotics, Coumadin dosing):  Denies use of other OTC or herbal medications.       Allergies reviewed      Electronically signed by Hussein Fleming on 8/17/2020 at 11:04 AM

## 2020-08-17 NOTE — PROGRESS NOTES
Hospitalist Progress Note      Patient:  Afshin Ellis    Unit/Bed:4K-05/005-A  YOB: 1970  MRN: [id number]   Acct: [de-identified]   PCP: WAGNER Cohen CNP  Date of Admission: 8/16/2020    Assessment/Plan:  Recurrent syncopal episodes:   Unclear etiology at this time. Likely multifactorial, orthostatics hypotension (given positive orthostatics) vs dysautonomia secondary to uncontrolled DM vs dehydration (pt reports having diarrhea x4 days that has been resolved since Saturday prior to admission) vs cardiac arrhythmias (patient was to get a 30 day holter monitor on discharge from last admission but she did not get it for unclear reasons) vs polypharmacy (on Mirtazapine, Olanzapine, Venlafaxine, although she has been on it for years reportedly and the timing is not consistent with the time of onset of her recurrent syncopal episodes). Less likely to related to HF given normal cardiac work up in last admission. Of note, she has a history of vertigo but states that this dizziness is not similar in nature. Also reports intermittent tinnitus but unable to report any exacerbating or alleviating factors. Reports no fullness in ears. -daily orthostatics  -will give her compression stockings and recheck orthostatics to assess response. If continues to have symptoms, will consider Midodrine for BP support  -PT/OT assessment    DM II uncontrolled   Hgb A1c 11.5 (8/16/2020). History of medication noncompliance. -on home Lantus 25 units with sliding scale  -monitor BS and adjust accordingly  -will  on medication noncompliance  -will need close follow up with PCP    Hx of HTN  BP controlled inpatient at goal   -On Losartan at home, this was stopped upon discharge last time. Will clarify if she was restarted on it prior to discharge.      History of polysubstance abuse/Hx of cocaine and methamphetamine abuse:   Seen by Addiction services and OP glargine  25 Units Subcutaneous BID    pantoprazole  40 mg Oral QAM AC    levothyroxine  50 mcg Oral QAM AC    meclizine  12.5 mg Oral QAM AC    mirtazapine  15 mg Oral Nightly    OLANZapine  5 mg Oral Nightly    venlafaxine  75 mg Oral Daily    sodium chloride flush  10 mL Intravenous 2 times per day    enoxaparin  40 mg Subcutaneous Q24H    insulin lispro  0-12 Units Subcutaneous TID WC    insulin lispro  0-6 Units Subcutaneous Nightly    lidocaine  1 patch Transdermal Daily     PRN Meds: sodium chloride flush, acetaminophen **OR** acetaminophen, polyethylene glycol, promethazine **OR** ondansetron, glucose, dextrose, glucagon (rDNA), dextrose      Intake/Output Summary (Last 24 hours) at 8/17/2020 0839  Last data filed at 8/17/2020 0837  Gross per 24 hour   Intake 930.22 ml   Output 1000 ml   Net -69.78 ml       Diet:  DIET CARB CONTROL; Exam:  BP (!) 140/67   Pulse 72   Temp 97.6 °F (36.4 °C) (Oral)   Resp 20   Ht 5' 5\" (1.651 m)   Wt 229 lb 4.8 oz (104 kg)   LMP 01/01/2020 (Approximate)   SpO2 96%   BMI 38.16 kg/m²   General appearance: No apparent distress, appears stated age and cooperative. HEENT: Pupils equal, round, and reactive to light. Conjunctivae/corneas clear. Neck: Supple, with full range of motion. No jugular venous distention. Trachea midline. Respiratory:  Normal respiratory effort. Clear to auscultation, bilaterally without Rales/Wheezes/Rhonchi. Cardiovascular: Regular rate and rhythm with normal S1/S2 without murmurs, rubs or gallops. Abdomen: Soft, non-tender, non-distended with normal bowel sounds. Musculoskeletal: passive and active ROM x 4 extremities. Skin: Skin color, texture, turgor normal.  No rashes or lesions. Neurologic:  Neurovascularly intact without any focal sensory/motor deficits. Cranial nerves: II-XII intact, grossly non-focal. No gait abnormalities except slight imbalance due to her recent fracture. No dysdiadochokinesia.  Finger to nose test normal.   Psychiatric: Alert and oriented, thought content appropriate, normal insight  Capillary Refill: Brisk,< 3 seconds   Peripheral Pulses: +2 palpable, equal bilaterally     Labs:   Recent Labs     08/17/20  0522   WBC 7.2   HGB 13.6   HCT 40.8        Recent Labs     08/17/20  0522      K 4.1      CO2 27   BUN 21   CREATININE 0.8   CALCIUM 8.8     No results for input(s): AST, ALT, BILIDIR, BILITOT, ALKPHOS in the last 72 hours. No results for input(s): INR in the last 72 hours. No results for input(s): Nydia Farm in the last 72 hours. Microbiology:    Blood culture #1: No results found for: [facility]    Blood culture #2:No results found for: Allyson Dooley    Organism:No results found for: [facility]      Lab Results   Component Value Date    LABGRAM  03/24/2020     Rare segmented neutrophils observed. Few epithelial cells observed. Many large gram positive bacilli. Many gram positive cocci occurring singly and in pairs. Moderate small gram positive bacilli. Moderate gram negative bacilli. Smear consistent with Normal Vaginal Faustina. MRSA culture only:No results found for: [address] [place] [address]    Urine culture: No results found for: LABURIN    Respiratory culture: No results found for: CULTRESP    Aerobic and Anaerobic :  No results found for: LABAERO  No results found for: LABANAE    Urinalysis:      Lab Results   Component Value Date    NITRU NEGATIVE 08/12/2020    BLOODU NEGATIVE 08/12/2020    GLUCOSEU >= 1000 08/12/2020       Radiology:  No orders to display     No results found.       Cesar Tucker MD   PGY1, Internal Medicine   8/17/2020 at 8:39 AM

## 2020-08-17 NOTE — FLOWSHEET NOTE
08/17/20 1154   Provider Notification   Reason for Communication Patient request   Provider Name    Provider Notification Physician   Method of Communication Secure Message   Response See orders   Notification Time    received order for Clotrimazole 1% cream. Also received order to check orth BP per shift.

## 2020-08-17 NOTE — PROGRESS NOTES
[address] [address]  INPATIENT PHYSICAL THERAPY  EVALUATION  [facility] ICU STEPDOWN TELEMETRY 4K - 4K-05/005-A    Time In: 1330  Time Out: 1409  Timed Code Treatment Minutes: 26 Minutes  Minutes: 39          Date: 2020  Patient Name: Diogenes Ford,  Gender:  female        MRN: [id number]  : 1970  (52 y.o.)  Referral Date : 20   Referring Practitioner: LINDA Del Rosario MD  Diagnosis: syncope  Additional Pertinent Hx: Patient is a 70-year-old female with a past medical history of type 2 diabetes on insulin, recurrent syncopal episodes, CKD, polysubstance abuse, hepatitis C, hypothyroidism, hypertension, obesity. She was recently admitted for a syncopal episode and it was thought to be due to orthostatic hypotension. Consideration was given to polypharmacy and dysautonomia plus or minus substance abuse. She underwent CT imaging and CTA of the chest wall as well as CTA of the neck she was found to have an EF of 50 to 55% on echocardiogram.  Ultimately extensive cardiac work-up was noncontributory. She was given IV fluids and symptoms improved on the day of discharge thus she was discharged home. She was on for a few days when she had another syncopal episode while at home. She was brought to [facility] by EMS. While there she had a lactic acidosis of 2.6 and also a leukocytosis of 19. She was also hyperglycemic into the 300 range. She was given IV fluids and IV antibiotics and symptoms somewhat improved. Today she is still dizzy upon standing. Otherwise she appears nontoxic and otherwise doing well. She states that she has not used any more illicit substances. She denies any chest pain or shortness of breath. She denies any fevers or chills. She denies any nausea or vomiting     Restrictions/Precautions:   Dizziness/syncope/orthostatic hypotension - perform Epley!     Subjective:  Chart Reviewed: Yes  Patient assessed for rehabilitation services?: Yes  Family / Caregiver Present: No  Subjective: Doing well, agreeable to therapy    General:  Overall Orientation Status: Within Normal Limits  Follows Commands: Within Functional Limits    Vision: Impaired  Vision Exceptions: Wears glasses for reading    Hearing: Within functional limits         Pain: -/10: -    Social/Functional History:    Lives With: Friend(s)(best friend)  Type of Home: Mobile home  Home Layout: One level  Home Access: Stairs to enter with rails  Entrance Stairs - Number of Steps: 2+2  Entrance Stairs - Rails: None  Home Equipment: Cane, Rolling walker(cane before, RW since ankle fracture)     Bathroom Shower/Tub: Tub/Shower unit  Bathroom Toilet: Standard  Bathroom Equipment: Grab bars in shower  Bathroom Accessibility: Accessible    Receives Help From: Friend(s)  ADL Assistance: Independent(prior)  Homemaking Assistance: Independent  Ambulation Assistance: (RW, SPC)               OBJECTIVE:  Range of Motion:  Bilateral Lower Extremity: WFL    Strength:  Bilateral Lower Extremity: grossly 4-/5    Balance:  Static Sitting Balance:  Independent  Dynamic Sitting Balance: Independent  Static Standing Balance: Contact Guard Assistance, X 1  Dynamic Standing Balance: Minimal Assistance, X 1, with cues for safety, d/t dizziness    Bed Mobility:  Rolling to Left: Contact Guard Assistance, X 1   Rolling to Right: Contact Guard Assistance, X 1   Supine to Sit: Minimal Assistance, X 1, with head of bed raised  Sit to Supine: Contact Guard Assistance, X 1, with head of bed raised     Transfers:  Sit to Stand: [employer], X 1, with increased time for completion  Stand to [address] [address] [facility], X 1, with increased time for completion    Ambulation:  Minimal Assistance, X 1, with cues for safety, assist for upright stability  Distance: 4'  Surface: Level Tile  Device:Rolling Walker  Gait Deviations:   Forward Flexed Posture, Slow Lyly, Decreased Step Length Bilaterally and Unsteady Gait    Exercise:  Patient was guided in 1 set(s) 10 reps of exercise to both lower extremities. Seated marches, Long arc quads and habituation exercises for semi-circular canal re-positioning. Exercises were completed for increased independence with functional mobility. *Performed Epley starting in long sitting with L cervical rotation*    Functional Outcome Measures: Completed  AM-PAC Inpatient Mobility Raw Score : 20  AM-PAC Inpatient T-Scale Score : 47.67    ASSESSMENT:  Activity Tolerance:  Patient tolerance of  treatment: good. Decreased dizziness noted after Epley performed. Treatment Initiated: Treatment and education initiated within context of evaluation. Evaluation time included review of current medical information, gathering information related to past medical, social and functional history, completion of standardized testing, formal and informal observation of tasks, assessment of data and development of plan of care and goals. Treatment time included skilled education and facilitation of tasks to increase safety and independence with functional mobility for improved independence and quality of life. Assessment: Body structures, Functions, Activity limitations: Decreased functional mobility , Decreased balance, Decreased strength, Decreased endurance, Vestibular Impairment  Assessment: 51 y/o female with history of L ankle fracture around one month ago with progressively worsening dizziness bouts with syncopal episodes. These dizziness bouts seem to be related to BPPV. Canalith re-positioning maneuver performed today in hospital bed.   Prognosis: Good    REQUIRES PT FOLLOW UP: Yes    Discharge Recommendations:  Discharge Recommendations: Continue to assess pending progress, Outpatient PT(outpatient PT for ankle and vestibular rehab)    Patient Education:  PT Education: Goals, General Safety, PT Role  Patient Education: smooth pursuit for HEP, perform another Epley    Equipment Recommendations:  Equipment Needed:

## 2020-08-18 VITALS
HEIGHT: 65 IN | BODY MASS INDEX: 38.59 KG/M2 | OXYGEN SATURATION: 97 % | RESPIRATION RATE: 14 BRPM | DIASTOLIC BLOOD PRESSURE: 75 MMHG | HEART RATE: 80 BPM | WEIGHT: 231.6 LBS | SYSTOLIC BLOOD PRESSURE: 143 MMHG | TEMPERATURE: 97.9 F

## 2020-08-18 LAB
ANION GAP SERPL CALCULATED.3IONS-SCNC: 9 MEQ/L (ref 8–16)
BUN BLDV-MCNC: 19 MG/DL (ref 7–22)
CALCIUM SERPL-MCNC: 8.4 MG/DL (ref 8.5–10.5)
CHLORIDE BLD-SCNC: 104 MEQ/L (ref 98–111)
CO2: 23 MEQ/L (ref 23–33)
CREAT SERPL-MCNC: 0.6 MG/DL (ref 0.4–1.2)
ERYTHROCYTE [DISTWIDTH] IN BLOOD BY AUTOMATED COUNT: 12.3 % (ref 11.5–14.5)
ERYTHROCYTE [DISTWIDTH] IN BLOOD BY AUTOMATED COUNT: 40.8 FL (ref 35–45)
GFR SERPL CREATININE-BSD FRML MDRD: > 90 ML/MIN/1.73M2
GLUCOSE BLD-MCNC: 109 MG/DL (ref 70–108)
GLUCOSE BLD-MCNC: 164 MG/DL (ref 70–108)
GLUCOSE BLD-MCNC: 193 MG/DL (ref 70–108)
GLUCOSE BLD-MCNC: 219 MG/DL (ref 70–108)
GLUCOSE BLD-MCNC: 244 MG/DL (ref 70–108)
GLUCOSE BLD-MCNC: 51 MG/DL (ref 70–108)
GLUCOSE BLD-MCNC: 93 MG/DL (ref 70–108)
HCT VFR BLD CALC: 40.7 % (ref 37–47)
HEMOGLOBIN: 13.5 GM/DL (ref 12–16)
MCH RBC QN AUTO: 30.2 PG (ref 26–33)
MCHC RBC AUTO-ENTMCNC: 33.2 GM/DL (ref 32.2–35.5)
MCV RBC AUTO: 91.1 FL (ref 81–99)
PLATELET # BLD: 229 THOU/MM3 (ref 130–400)
PMV BLD AUTO: 9.6 FL (ref 9.4–12.4)
POTASSIUM REFLEX MAGNESIUM: 4.1 MEQ/L (ref 3.5–5.2)
RBC # BLD: 4.47 MILL/MM3 (ref 4.2–5.4)
SODIUM BLD-SCNC: 136 MEQ/L (ref 135–145)
WBC # BLD: 6.6 THOU/MM3 (ref 4.8–10.8)

## 2020-08-18 PROCEDURE — 80048 BASIC METABOLIC PNL TOTAL CA: CPT

## 2020-08-18 PROCEDURE — 36415 COLL VENOUS BLD VENIPUNCTURE: CPT

## 2020-08-18 PROCEDURE — 99239 HOSP IP/OBS DSCHRG MGMT >30: CPT | Performed by: HOSPITALIST

## 2020-08-18 PROCEDURE — 97530 THERAPEUTIC ACTIVITIES: CPT

## 2020-08-18 PROCEDURE — 96374 THER/PROPH/DIAG INJ IV PUSH: CPT

## 2020-08-18 PROCEDURE — 6370000000 HC RX 637 (ALT 250 FOR IP): Performed by: INTERNAL MEDICINE

## 2020-08-18 PROCEDURE — 82948 REAGENT STRIP/BLOOD GLUCOSE: CPT

## 2020-08-18 PROCEDURE — 99232 SBSQ HOSP IP/OBS MODERATE 35: CPT | Performed by: PHYSICIAN ASSISTANT

## 2020-08-18 PROCEDURE — G0378 HOSPITAL OBSERVATION PER HR: HCPCS

## 2020-08-18 PROCEDURE — 93660 TILT TABLE EVALUATION: CPT | Performed by: NUCLEAR MEDICINE

## 2020-08-18 PROCEDURE — 2580000003 HC RX 258: Performed by: INTERNAL MEDICINE

## 2020-08-18 PROCEDURE — 6370000000 HC RX 637 (ALT 250 FOR IP): Performed by: STUDENT IN AN ORGANIZED HEALTH CARE EDUCATION/TRAINING PROGRAM

## 2020-08-18 PROCEDURE — 93270 REMOTE 30 DAY ECG REV/REPORT: CPT

## 2020-08-18 PROCEDURE — 85027 COMPLETE CBC AUTOMATED: CPT

## 2020-08-18 PROCEDURE — 94760 N-INVAS EAR/PLS OXIMETRY 1: CPT

## 2020-08-18 PROCEDURE — 97166 OT EVAL MOD COMPLEX 45 MIN: CPT

## 2020-08-18 PROCEDURE — 97116 GAIT TRAINING THERAPY: CPT

## 2020-08-18 RX ADMIN — LEVOTHYROXINE SODIUM 50 MCG: 50 TABLET ORAL at 06:40

## 2020-08-18 RX ADMIN — PANTOPRAZOLE SODIUM 40 MG: 40 TABLET, DELAYED RELEASE ORAL at 06:40

## 2020-08-18 RX ADMIN — VENLAFAXINE HYDROCHLORIDE 75 MG: 75 CAPSULE, EXTENDED RELEASE ORAL at 08:34

## 2020-08-18 RX ADMIN — MECLIZINE HYDROCHLORIDE 12.5 MG: 12.5 TABLET, FILM COATED ORAL at 06:40

## 2020-08-18 RX ADMIN — ACETAMINOPHEN 650 MG: 325 TABLET ORAL at 01:45

## 2020-08-18 RX ADMIN — Medication 10 ML: at 08:34

## 2020-08-18 RX ADMIN — CLOTRIMAZOLE: 10 CREAM TOPICAL at 08:34

## 2020-08-18 RX ADMIN — GABAPENTIN 300 MG: 300 CAPSULE ORAL at 14:48

## 2020-08-18 RX ADMIN — ASPIRIN 81 MG: 81 TABLET, CHEWABLE ORAL at 08:34

## 2020-08-18 RX ADMIN — INSULIN GLARGINE 25 UNITS: 100 INJECTION, SOLUTION SUBCUTANEOUS at 00:15

## 2020-08-18 RX ADMIN — DEXTROSE MONOHYDRATE 12.5 G: 500 INJECTION PARENTERAL at 10:07

## 2020-08-18 RX ADMIN — DIVALPROEX SODIUM 125 MG: 125 CAPSULE ORAL at 08:34

## 2020-08-18 RX ADMIN — GABAPENTIN 300 MG: 300 CAPSULE ORAL at 08:34

## 2020-08-18 RX ADMIN — INSULIN GLARGINE 25 UNITS: 100 INJECTION, SOLUTION SUBCUTANEOUS at 08:38

## 2020-08-18 ASSESSMENT — PAIN SCALES - GENERAL
PAINLEVEL_OUTOF10: 7
PAINLEVEL_OUTOF10: 0

## 2020-08-18 NOTE — PROGRESS NOTES
Cardiology Progress Note      Patient:  Loraine Dan  YOB: 1970  MRN: [id number]   Acct: [de-identified]  [facility] Date:  8/16/2020  Primary Cardiologist: none  Pt seen by dr Prabhu Stroud      Note per dr Carli Shah:  Recurrent dizziness and syncope.     REQUESTING PROVIDER:  Hospitalist Service.     HISTORY OF PRESENT ILLNESS:  This is a very unfortunate 57-year-old  patient who has extensive cardiac history; history of coronary artery  disease, bypass surgery 2012; history of hypertension; hyperlipidemia  and longstanding diabetes who apparently has had recurrent symptoms of  dizziness, lightheadedness and syncope for the last several weeks. She  was admitted here not too long ago with similar symptoms, underwent  evaluation, had a stress test and an echocardiogram both of which did  not show any specific findings. She does have some chronic shortness of  breath; however, no significant chest pain lately. Lately she has  become pretty much chronically dizzy in different positions. Initially,  she was having issues with dizziness and syncope where she was in an  upright position, but lately it progressed into pretty much continuous  dizziness. No specific triggers, no specific associated symptoms\"    Subjective (Events in last 24 hours): pt awake and alert. NAD. No cp or sob. No dizziness today.   Pt was not feeling well when her sugar was 61 earlier today but was given sugar water      Objective:   /73   Pulse 85   Temp 97.9 °F (36.6 °C) (Oral)   Resp 17   Ht 5' 5\" (1.651 m)   Wt 231 lb 9.6 oz (105.1 kg)   LMP 01/01/2020 (Approximate)   SpO2 94%   BMI 38.54 kg/m²        TELEMETRY: nsr    Physical Exam:  General Appearance: alert and oriented to person, place and time, in no acute distress  Cardiovascular: normal rate, regular rhythm, normal S1 and S2, no murmurs, rubs, clicks, or gallops, distal pulses intact, no carotid bruits, no JVD  Pulmonary/Chest: clear to auscultation bilaterally- no wheezes, rales or rhonchi, normal air movement, no respiratory distress  Abdomen: soft, non-tender, non-distended, normal bowel sounds, no masses Extremities: no cyanosis, clubbing or edema, pulse   Skin: warm and dry, no rash or erythema  Head: normocephalic and atraumatic  Eyes: pupils equal, round, and reactive to light  Neck: supple and non-tender without mass, no thyromegaly   Musculoskeletal: normal range of motion, no joint swelling, deformity or tenderness  Neurological: alert, oriented, normal speech, no focal findings or movement disorder noted    Medications:    clotrimazole   Topical BID    divalproex  125 mg Oral BID    aspirin  81 mg Oral Daily    atorvastatin  20 mg Oral Nightly    gabapentin  300 mg Oral TID    insulin glargine  25 Units Subcutaneous BID    pantoprazole  40 mg Oral QAM AC    levothyroxine  50 mcg Oral QAM AC    meclizine  12.5 mg Oral QAM AC    mirtazapine  15 mg Oral Nightly    OLANZapine  5 mg Oral Nightly    venlafaxine  75 mg Oral Daily    sodium chloride flush  10 mL Intravenous 2 times per day    enoxaparin  40 mg Subcutaneous Q24H    insulin lispro  0-12 Units Subcutaneous TID WC    insulin lispro  0-6 Units Subcutaneous Nightly    lidocaine  1 patch Transdermal Daily      dextrose       sodium chloride flush, 10 mL, PRN  acetaminophen, 650 mg, Q6H PRN    Or  acetaminophen, 650 mg, Q6H PRN  polyethylene glycol, 17 g, Daily PRN  promethazine, 12.5 mg, Q6H PRN    Or  ondansetron, 4 mg, Q6H PRN  glucose, 15 g, PRN  dextrose, 12.5 g, PRN  glucagon (rDNA), 1 mg, PRN  dextrose, 100 mL/hr, PRN        Lab Data:    Cardiac Enzymes:  No results for input(s): CKTOTAL, CKMB, CKMBINDEX, TROPONINI in the last 72 hours.     CBC:   Lab Results   Component Value Date    WBC 6.6 08/18/2020    RBC 4.47 08/18/2020    HGB 13.5 08/18/2020    HCT 40.7 08/18/2020     08/18/2020       CMP:    Lab Results   Component Value Date     08/18/2020

## 2020-08-18 NOTE — PROGRESS NOTES
[facility]  INPATIENT OCCUPATIONAL THERAPY  STR ICU STEPDOWN TELEMETRY 4K  EVALUATION    Time:    Time In: 6  Time Out: 0957  Timed Code Treatment Minutes: 0 Minutes  Minutes: 16          Date: 2020  Patient Name: Merritt Cotton,   Gender: female      MRN: [id number]  : 1970  (52 y.o.)  Referring Practitioner: Dr. Jonathan Macedo  Diagnosis: syncope & collapse  Additional Pertinent Hx: Patient is a 55-year-old female with a past medical history of type 2 diabetes on insulin, recurrent syncopal episodes, CKD, polysubstance abuse, hepatitis C, hypothyroidism, hypertension, obesity. She was recently admitted for a syncopal episode and it was thought to be due to orthostatic hypotension. Consideration was given to polypharmacy and dysautonomia plus or minus substance abuse. She underwent CT imaging and CTA of the chest wall as well as CTA of the neck she was found to have an EF of 50 to 55% on echocardiogram.  Ultimately extensive cardiac work-up was noncontributory. She was given IV fluids and symptoms improved on the day of discharge thus she was discharged home. She was on for a few days when she had another syncopal episode while at home. She was brought to [facility] by EMS. While there she had a lactic acidosis of 2.6 and also a leukocytosis of 19. She was also hyperglycemic into the 300 range. She was given IV fluids and IV antibiotics and symptoms somewhat improved. Today she is still dizzy upon standing. Otherwise she appears nontoxic and otherwise doing well. She states that she has not used any more illicit substances. She denies any chest pain or shortness of breath. She denies any fevers or chills.   She denies any nausea or vomiting    Restrictions/Precautions:  Restrictions/Precautions: Weight Bearing  Left Lower Extremity Weight Bearing: Weight Bearing As Tolerated  Required Braces or Orthoses  Left Lower Extremity Brace: Boot(not here, at home; has been wearing walking boot x 4 weeks)    Subjective  Chart Reviewed: Yes, Orders, Progress Notes, History and Physical  Patient assessed for rehabilitation services?: Yes  Family / Caregiver Present: No    Subjective: cooperative. Pt reported feeling her sugar was low upon returning from bathroom-tech came in to check & was to let nurse know    Pain:  Pain Assessment  Patient Currently in Pain: Yes(\"discomfort in L ankle\" reports hx of fx 1 month ago & wearing a boot)    Social/Functional History:  Lives With: Friend(s)(roomate)  Type of Home: Mobile home  Home Layout: One level  Home Access: Stairs to enter without rails  Entrance Stairs - Number of Steps: 2+2  Entrance Stairs - Rails: None  Home Equipment: Cane, Rolling walker   Bathroom Shower/Tub: Tub/Shower unit  Bathroom Toilet: Standard  Bathroom Equipment: Grab bars in shower  Bathroom Accessibility: Accessible    Receives Help From: Friend(s)  ADL Assistance: Independent  Homemaking Assistance: Independent  Ambulation Assistance: Independent  Transfer Assistance: Independent          Additional Comments: Pt reports using RW since injurying L ankle.     Cognition/Orientation:     Overall Cognitive Status: WFL    ADL's:  LE Dressing: Dependent/Total(for adjusting slipper socks)  Toileting: Stand by assistance       Functional Mobility:  Bed mobility  Supine to Sit: Stand by assistance  Sit to Supine: Stand by assistance    Functional Mobility  Functional - Mobility Device: Rolling Walker  Activity: To/from bathroom  Assist Level: Stand by assistance  Functional Mobility Comments: steady without LOB     Balance:  Balance  Sitting Balance: Independent  Standing Balance: Stand by assistance    Transfers:  Sit to stand: Stand by assistance  Stand to sit: Stand by assistance  Toilet Transfers  Toilet - Technique: Ambulating  Equipment Used: Standard toilet  Toilet Transfer: Stand by assistance    Upper Extremity Assessment:   LUE AROM : WFL  RUE AROM : WFL    LUE Strength  Gross safety  Short term goal 2: Complete LE dressing with S & good safety awareness  Short term goal 3: Complete BADL routine with S & 0 vcs for safety  Short term goal 4: Complete mobility to/from bathrom + with ADL item retrieval with RW, S, & 0-2 vcs for safety  Long term goals  Time Frame for Long term goals : No LTG set d/t short ELOS  See long-term goal time frame for expected duration of plan of care. If no long-term goals established, a short length of stay is anticipated. Following session, patient left in safe position with all fall risk precautions in place.

## 2020-08-18 NOTE — DISCHARGE SUMMARY
Discharge Summary    Date:8/18/2020        Patient Kuldeep LUI Rudi     Date of Birth:[birth year]     Age:49 y.o. Admit Date:8/16/2020   Admission Condition:stable   Discharged Condition:fair  Discharge Date: 08/18/20     Discharge Diagnoses   Active Problems:    Syncope and collapse  Resolved Problems:    Dizziness      Hospital Stay   Narrative of Hospital Course:   52 y.o. female with history of morbid obesity, CAD s/p stent placement in 2011, CABG 2012, recurrent syncopal episodes, uncontrolled DM, hypothyroidism, and polysubstance abuse disorder, presented after a syncope episode. She had a recent hospitalization for similar presentation. Cardiac work up was all negative during the last admission. Orthostatics were positive on admission. Admitted for further workup and management. Tilt table test per Cardiology was unremarkable. It was decided in collaboration to discharge patient with plans to follow up outpatient. Home health orders in place. At the time of discharged, patient reported resolution of symptoms. Remained afebrile and hemodynamically stable and was back to her baseline. Physical Exam:  General appearance: No apparent distress, appears stated age and cooperative. HEENT: Pupils equal, round, and reactive to light. Conjunctivae/corneas clear. Neck: Supple, with full range of motion. Respiratory:  Normal respiratory effort. Clear to auscultation, bilaterally without Rales/Wheezes/Rhonchi. Cardiovascular: Regular rate and rhythm with normal S1/S2 without murmurs, rubs or gallops. Abdomen: Soft, non-tender, non-distended with normal bowel sounds. Musculoskeletal: passive and active ROM x 4 extremities. Extremities: No BLE edema, compression stockings in place. Skin: Skin color, texture, turgor normal.  No rashes or lesions. Neurologic:  Neurovascularly intact without any focal sensory/motor deficits.  Cranial nerves: II-XII intact, grossly non-focal. No gait abnormalities except slight imbalance due to her recent fracture. No dysdiadochokinesia. Finger to nose test normal.   Psychiatric: Alert and oriented, thought content appropriate, normal insight  Capillary Refill: Brisk,< 3 seconds   Peripheral Pulses: +2 palpable, equal bilaterally     Assessment/Plan:  Recurrent syncopal episodes:   Unclear etiology at this time. Likely multifactorial, orthostatics hypotension (given positive orthostatics) vs dysautonomia secondary to longstanding uncontrolled DM vs dehydration (pt reports having diarrhea x4 days that has been resolved since prior to admission) vs cardiac arrhythmias (patient was to get a 30 day holter monitor on discharge from last admission but she did not get it for unclear reasons) vs polypharmacy (on Mirtazapine, Olanzapine, Venlafaxine, although she has been on it for years reportedly and the timing is not consistent with the time of onset of her recurrent syncopal episodes). Less likely to be related to HF given normal cardiac work up in last admission. -will give her compression stockings.   -Seen by PT/OT inpatient. Home health orders in place.   -will discharge with 30-day holter monitor (active order from previous admission)  -Follow up appointment outpatient with Dr. Korin Linder    DM II uncontrolled   Hgb A1c 11.5 (8/16/2020). History of medication noncompliance. -on home Lantus 25 units with sliding scale  -counseled on medication noncompliance  -will need close follow up with PCP within 1-2 weeks upon discharge     Hx of HTN  BP controlled inpatient at goal   -Stop home meds Losartan and Metoprolol.  These meds were supposed to be discontinued after her last discharge on 8/4/2020.  -In the setting of orthostatic hypotension, holding off on starting new BP meds but patient will need close follow up with PCP for re-evaluation and consideration of starting possibly Norvasc.      History of polysubstance abuse/Hx of cocaine and methamphetamine abuse:   Seen by Addiction services and OP resources offered in last admission. Reports she has not used any since her last discharge 8/4/2020.  -counseled, offered to provide more OP resources if she needs further help      Chronic hepatitis C: not on treatment. Will likely need outpatient follow up with GI/hepatology. Hypothyroidism: on Levothyroxine; TSH marginally elevated (4.46 on 7/26/2020)  GERD: on PPI  Hx of depression: Continue home meds  Obersity w/ BMI 38.3: counseled on weight loss and nutrition. Consultants:   IP CONSULT TO CARDIOLOGY  IP CONSULT TO SOCIAL WORK  IP CONSULT TO HOME CARE NEEDS    Time Spent on Discharge:  60 minutes were spent in patient examination, evaluation, counseling as well as medication reconciliation, prescriptions for required medications, discharge plan and follow up.       Significant Diagnostic Studies:   Recent Labs:  CBC:   Lab Results   Component Value Date    WBC 6.6 08/18/2020    RBC 4.47 08/18/2020    HGB 13.5 08/18/2020    HCT 40.7 08/18/2020    MCV 91.1 08/18/2020    MCH 30.2 08/18/2020    MCHC 33.2 08/18/2020    RDW 13.1 08/28/2019     08/18/2020     BMP:    Lab Results   Component Value Date    GLUCOSE 244 08/18/2020     08/18/2020    K 4.1 08/18/2020     08/18/2020    CO2 23 08/18/2020    ANIONGAP 9.0 08/18/2020    BUN 19 08/18/2020    CREATININE 0.6 08/18/2020    BUNCRER NOT REPORTED 08/28/2019    CALCIUM 8.4 08/18/2020    LABGLOM >90 08/18/2020    GFRAA >60 08/28/2019    GFR      08/28/2019    GFR NOT REPORTED 08/28/2019     HFP:    Lab Results   Component Value Date    PROT 6.3 08/12/2020     CMP:    Lab Results   Component Value Date    GLUCOSE 244 08/18/2020     08/18/2020    K 4.1 08/18/2020     08/18/2020    CO2 23 08/18/2020    BUN 19 08/18/2020    CREATININE 0.6 08/18/2020    ANIONGAP 9.0 08/18/2020    ALKPHOS 154 08/12/2020    ALT 39 08/12/2020    AST 40 08/12/2020    BILITOT 0.4 08/12/2020    LABALBU 3.3 08/12/2020    ALBUMIN 1.1 08/28/2019 Sopn  Generic drug:  Dulaglutide     venlafaxine 75 MG extended release capsule  Commonly known as:  EFFEXOR XR        STOP taking these medications    losartan 25 MG tablet  Commonly known as:  COZAAR     metoprolol tartrate 25 MG tablet  Commonly known as:  LOPRESSOR            Electronically signed by Michael Bower MD on 8/18/20 at 10:58 AM EDT

## 2020-08-18 NOTE — PROGRESS NOTES
[address] [address]  INPATIENT PHYSICAL THERAPY  DAILY NOTE  [facility] ICU STEPDOWN TELEMETRY 4K - 4K--A    Time In: 5279  Time Out: 9718  Timed Code Treatment Minutes: 23 Minutes  Minutes: 23          Date: 2020  Patient Name: Merritt Cotton,  Gender:  female        MRN: [id number]  : 1970  (52 y.o.)  Referral Date : 20  Referring Practitioner: LINDA Del Rosario MD  Diagnosis: syncope  Additional Pertinent Hx: Patient is a 24-year-old female with a past medical history of type 2 diabetes on insulin, recurrent syncopal episodes, CKD, polysubstance abuse, hepatitis C, hypothyroidism, hypertension, obesity. She was recently admitted for a syncopal episode and it was thought to be due to orthostatic hypotension. Consideration was given to polypharmacy and dysautonomia plus or minus substance abuse. She underwent CT imaging and CTA of the chest wall as well as CTA of the neck she was found to have an EF of 50 to 55% on echocardiogram.  Ultimately extensive cardiac work-up was noncontributory. She was given IV fluids and symptoms improved on the day of discharge thus she was discharged home. She was on for a few days when she had another syncopal episode while at home. She was brought to [facility] by EMS. While there she had a lactic acidosis of 2.6 and also a leukocytosis of 19. She was also hyperglycemic into the 300 range. She was given IV fluids and IV antibiotics and symptoms somewhat improved. Today she is still dizzy upon standing. Otherwise she appears nontoxic and otherwise doing well. She states that she has not used any more illicit substances. She denies any chest pain or shortness of breath. She denies any fevers or chills.   She denies any nausea or vomiting     Prior Level of Function:  Lives With: Friend(s)(roomate)  Type of Home: Mobile home  Home Layout: One level  Home Access: Stairs to enter without rails  Entrance Stairs - Number of Steps: 2+2  Entrance Stairs -

## 2020-08-18 NOTE — PROCEDURES
30 day cardiac event monitor applied. Instructions given. Prep taught. Patient understands.   Renee, CCT

## 2020-08-19 ENCOUNTER — TELEPHONE (OUTPATIENT)
Dept: CARDIOLOGY CLINIC | Age: 50
End: 2020-08-19

## 2020-08-19 NOTE — PROCEDURES
[address] [place] [address]                                TILT TABLE TEST    PATIENT NAME: Eliz LUI                    :        1970  MED REC NO:   [id number]                           ROOM:       0005  ACCOUNT NO:   [de-identified]                           ADMIT DATE: 2020  PROVIDER:     IVONE [place]:  2020    CLINICAL HISTORY AND INDICATION:  A 52year-old patient with dizziness. TILT TABLE TEST DESCRIPTION AND FINDINGS:  Baseline EKG showed sinus  rhythm. Baseline blood pressure was 127/80. Baseline heart rate was 80  beats per minute. The patient was tilted for a total of 30 minutes at  70-degree angle. Tilt was associated with no significant symptoms. Blood pressure and heart rate remained stable, ranging between 622 to  156 systolic. Test was completed without any complication. CONCLUSION:  Tilt table test was pretty much unremarkable with no  significant hemodynamic changes.         Karina Vicente M.D.    D: 2020 17:33:49       T: 2020 17:49:42     [email or web address]  Job#: [id number]     Doc#: [id number]    CC:

## 2020-08-19 NOTE — LETTER
Formerly [facility], [facility]  [address] [address]  Phone: [phone]  Fax: [phone]    WAGNER Gamble CNP        August 20, 2020    Diogenes Ford  [address] [place] [address]      Dear Dilshad Parks: We have not been able to reach you by phone. Our records indicate that you have missed the CHF appointment on 08.13.2020. Please call the office to reschedule. If you have any questions or concerns, please don't hesitate to call.     Sincerely,        WAGNER Gamble CNP/ brien

## 2020-09-10 ENCOUNTER — TELEPHONE (OUTPATIENT)
Dept: CARDIOLOGY CLINIC | Age: 50
End: 2020-09-10

## 2020-09-10 NOTE — LETTER
[address] [facility]  [facility].  [address] [place] [address]  Phone: [phone]  Fax: [phone]      September 10, 2020     Allison Allen  [address] [facility] [address] [facility]      Dear Baldev Obando:    I am writing you because I have been informed of your missed appointment(s). We care about you and the management of your healthcare and want to make sure that you follow up as recommended. We're sorry you were unable to keep your appointment and hope that you are doing well. We would like to continue treating your healthcare needs. Please call the office to let us know your plans for treatment and to reschedule your appointment.      Sincerely,        Maurice Jolley MD

## 2021-11-17 ENCOUNTER — TELEPHONE (OUTPATIENT)
Dept: CARDIOLOGY CLINIC | Age: 51
End: 2021-11-17

## 2021-11-17 NOTE — TELEPHONE ENCOUNTER
[facility] faxed referral LM for pt to call back to schedule appt . Dx Essential  Hypertension.

## 2021-11-23 NOTE — TELEPHONE ENCOUNTER
LM for patient to return call--due to several previous attempts to contact this patient to schedule an appointment, a letter will be sent and this encounter will be closed.

## 2024-12-16 ENCOUNTER — HOSPITAL ENCOUNTER (INPATIENT)
Age: 54
LOS: 2 days | Discharge: HOME | DRG: 174 | End: 2024-12-18
Attending: EMERGENCY MEDICINE | Admitting: STUDENT IN AN ORGANIZED HEALTH CARE EDUCATION/TRAINING PROGRAM
Payer: MEDICAID

## 2024-12-16 ENCOUNTER — APPOINTMENT (OUTPATIENT)
Dept: GENERAL RADIOLOGY | Age: 54
DRG: 174 | End: 2024-12-16
Payer: MEDICAID

## 2024-12-16 DIAGNOSIS — I21.4 NSTEMI (NON-ST ELEVATED MYOCARDIAL INFARCTION) (HCC): ICD-10-CM

## 2024-12-16 DIAGNOSIS — Z98.61 PERCUTANEOUS TRANSLUMINAL CORONARY ANGIOPLASTY STATUS: Primary | ICD-10-CM

## 2024-12-16 DIAGNOSIS — R79.89 ELEVATED TROPONIN: ICD-10-CM

## 2024-12-16 DIAGNOSIS — R07.9 CHEST PAIN, UNSPECIFIED TYPE: ICD-10-CM

## 2024-12-16 PROBLEM — I24.9 ACS (ACUTE CORONARY SYNDROME) (HCC): Status: ACTIVE | Noted: 2024-12-16

## 2024-12-16 LAB
ANION GAP SERPL CALC-SCNC: 12 MEQ/L (ref 8–16)
APTT PPP: 28.8 SECONDS (ref 22–38)
BASOPHILS ABSOLUTE: 0.1 THOU/MM3 (ref 0–0.1)
BASOPHILS NFR BLD AUTO: 0.5 %
BUN SERPL-MCNC: 19 MG/DL (ref 7–22)
CALCIUM SERPL-MCNC: 9 MG/DL (ref 8.5–10.5)
CHLORIDE SERPL-SCNC: 104 MEQ/L (ref 98–111)
CO2 SERPL-SCNC: 22 MEQ/L (ref 23–33)
CREAT SERPL-MCNC: 0.9 MG/DL (ref 0.4–1.2)
DEPRECATED RDW RBC AUTO: 36.4 FL (ref 35–45)
EKG ATRIAL RATE: 71 BPM
EKG ATRIAL RATE: 74 BPM
EKG P AXIS: 24 DEGREES
EKG P AXIS: 7 DEGREES
EKG P-R INTERVAL: 142 MS
EKG P-R INTERVAL: 148 MS
EKG Q-T INTERVAL: 416 MS
EKG Q-T INTERVAL: 424 MS
EKG QRS DURATION: 108 MS
EKG QRS DURATION: 116 MS
EKG QTC CALCULATION (BAZETT): 460 MS
EKG QTC CALCULATION (BAZETT): 461 MS
EKG R AXIS: -49 DEGREES
EKG R AXIS: -51 DEGREES
EKG T AXIS: 70 DEGREES
EKG T AXIS: 80 DEGREES
EKG VENTRICULAR RATE: 71 BPM
EKG VENTRICULAR RATE: 74 BPM
EOSINOPHIL NFR BLD AUTO: 3.7 %
EOSINOPHILS ABSOLUTE: 0.4 THOU/MM3 (ref 0–0.4)
ERYTHROCYTE [DISTWIDTH] IN BLOOD BY AUTOMATED COUNT: 12.4 % (ref 11.5–14.5)
GFR SERPL CREATININE-BSD FRML MDRD: 76 ML/MIN/1.73M2
GLUCOSE BLD STRIP.AUTO-MCNC: 278 MG/DL (ref 70–108)
GLUCOSE BLD STRIP.AUTO-MCNC: 83 MG/DL (ref 70–108)
GLUCOSE SERPL-MCNC: 151 MG/DL (ref 70–108)
HCT VFR BLD AUTO: 38.1 % (ref 37–47)
HEPARIN UNFRACTIONATED: 0.04 U/ML (ref 0.3–0.7)
HEPARIN UNFRACTIONATED: 0.34 U/ML (ref 0.3–0.7)
HGB BLD-MCNC: 13 GM/DL (ref 12–16)
IMM GRANULOCYTES # BLD AUTO: 0.03 THOU/MM3 (ref 0–0.07)
IMM GRANULOCYTES NFR BLD AUTO: 0.3 %
INR PPP: 0.98 (ref 0.85–1.13)
LYMPHOCYTES ABSOLUTE: 4 THOU/MM3 (ref 1–4.8)
LYMPHOCYTES NFR BLD AUTO: 36.4 %
MCH RBC QN AUTO: 27.5 PG (ref 26–33)
MCHC RBC AUTO-ENTMCNC: 34.1 GM/DL (ref 32.2–35.5)
MCV RBC AUTO: 80.7 FL (ref 81–99)
MONOCYTES ABSOLUTE: 0.6 THOU/MM3 (ref 0.4–1.3)
MONOCYTES NFR BLD AUTO: 5 %
NEUTROPHILS ABSOLUTE: 6 THOU/MM3 (ref 1.8–7.7)
NEUTROPHILS NFR BLD AUTO: 54.1 %
NRBC BLD AUTO-RTO: 0 /100 WBC
NT-PROBNP SERPL IA-MCNC: 364.2 PG/ML (ref 0–124)
OSMOLALITY SERPL CALC.SUM OF ELEC: 280.9 MOSMOL/KG (ref 275–300)
PLATELET # BLD AUTO: 273 THOU/MM3 (ref 130–400)
PMV BLD AUTO: 9.7 FL (ref 9.4–12.4)
POTASSIUM SERPL-SCNC: 4.5 MEQ/L (ref 3.5–5.2)
RBC # BLD AUTO: 4.72 MILL/MM3 (ref 4.2–5.4)
SODIUM SERPL-SCNC: 138 MEQ/L (ref 135–145)
TROPONIN, HIGH SENSITIVITY: 41 NG/L (ref 0–12)
TROPONIN, HIGH SENSITIVITY: 42 NG/L (ref 0–12)
TROPONIN, HIGH SENSITIVITY: 43 NG/L (ref 0–12)
TROPONIN, HIGH SENSITIVITY: 44 NG/L (ref 0–12)
TROPONIN, HIGH SENSITIVITY: 45 NG/L (ref 0–12)
TROPONIN, HIGH SENSITIVITY: 46 NG/L (ref 0–12)
WBC # BLD AUTO: 11.1 THOU/MM3 (ref 4.8–10.8)

## 2024-12-16 PROCEDURE — 85730 THROMBOPLASTIN TIME PARTIAL: CPT

## 2024-12-16 PROCEDURE — 93010 ELECTROCARDIOGRAM REPORT: CPT | Performed by: NUCLEAR MEDICINE

## 2024-12-16 PROCEDURE — 71045 X-RAY EXAM CHEST 1 VIEW: CPT

## 2024-12-16 PROCEDURE — 84484 ASSAY OF TROPONIN QUANT: CPT

## 2024-12-16 PROCEDURE — 93005 ELECTROCARDIOGRAM TRACING: CPT | Performed by: EMERGENCY MEDICINE

## 2024-12-16 PROCEDURE — 93005 ELECTROCARDIOGRAM TRACING: CPT | Performed by: STUDENT IN AN ORGANIZED HEALTH CARE EDUCATION/TRAINING PROGRAM

## 2024-12-16 PROCEDURE — 85610 PROTHROMBIN TIME: CPT

## 2024-12-16 PROCEDURE — 96375 TX/PRO/DX INJ NEW DRUG ADDON: CPT

## 2024-12-16 PROCEDURE — 36415 COLL VENOUS BLD VENIPUNCTURE: CPT

## 2024-12-16 PROCEDURE — 82948 REAGENT STRIP/BLOOD GLUCOSE: CPT

## 2024-12-16 PROCEDURE — 96374 THER/PROPH/DIAG INJ IV PUSH: CPT

## 2024-12-16 PROCEDURE — 85520 HEPARIN ASSAY: CPT

## 2024-12-16 PROCEDURE — 1200000003 HC TELEMETRY R&B

## 2024-12-16 PROCEDURE — 6370000000 HC RX 637 (ALT 250 FOR IP): Performed by: STUDENT IN AN ORGANIZED HEALTH CARE EDUCATION/TRAINING PROGRAM

## 2024-12-16 PROCEDURE — 6360000002 HC RX W HCPCS: Performed by: EMERGENCY MEDICINE

## 2024-12-16 PROCEDURE — 83880 ASSAY OF NATRIURETIC PEPTIDE: CPT

## 2024-12-16 PROCEDURE — 99285 EMERGENCY DEPT VISIT HI MDM: CPT

## 2024-12-16 PROCEDURE — 6370000000 HC RX 637 (ALT 250 FOR IP): Performed by: EMERGENCY MEDICINE

## 2024-12-16 PROCEDURE — 80048 BASIC METABOLIC PNL TOTAL CA: CPT

## 2024-12-16 PROCEDURE — 85025 COMPLETE CBC W/AUTO DIFF WBC: CPT

## 2024-12-16 RX ORDER — ASPIRIN 81 MG/1
81 TABLET, CHEWABLE ORAL DAILY
Status: DISCONTINUED | OUTPATIENT
Start: 2024-12-17 | End: 2024-12-17

## 2024-12-16 RX ORDER — HEPARIN SODIUM 1000 [USP'U]/ML
4000 INJECTION, SOLUTION INTRAVENOUS; SUBCUTANEOUS ONCE
Status: COMPLETED | OUTPATIENT
Start: 2024-12-16 | End: 2024-12-16

## 2024-12-16 RX ORDER — LOSARTAN POTASSIUM 25 MG/1
25 TABLET ORAL DAILY
Status: DISCONTINUED | OUTPATIENT
Start: 2024-12-17 | End: 2024-12-18 | Stop reason: HOSPADM

## 2024-12-16 RX ORDER — ISOSORBIDE MONONITRATE 30 MG/1
30 TABLET, EXTENDED RELEASE ORAL DAILY
Status: DISCONTINUED | OUTPATIENT
Start: 2024-12-17 | End: 2024-12-16

## 2024-12-16 RX ORDER — HEPARIN SODIUM 1000 [USP'U]/ML
2000 INJECTION, SOLUTION INTRAVENOUS; SUBCUTANEOUS PRN
Status: DISCONTINUED | OUTPATIENT
Start: 2024-12-16 | End: 2024-12-18 | Stop reason: HOSPADM

## 2024-12-16 RX ORDER — POTASSIUM CHLORIDE 1500 MG/1
40 TABLET, EXTENDED RELEASE ORAL PRN
Status: DISCONTINUED | OUTPATIENT
Start: 2024-12-16 | End: 2024-12-18 | Stop reason: HOSPADM

## 2024-12-16 RX ORDER — POTASSIUM CHLORIDE 7.45 MG/ML
10 INJECTION INTRAVENOUS PRN
Status: DISCONTINUED | OUTPATIENT
Start: 2024-12-16 | End: 2024-12-18 | Stop reason: HOSPADM

## 2024-12-16 RX ORDER — ISOSORBIDE MONONITRATE 60 MG/1
60 TABLET, EXTENDED RELEASE ORAL DAILY
Status: DISCONTINUED | OUTPATIENT
Start: 2024-12-17 | End: 2024-12-18

## 2024-12-16 RX ORDER — HEPARIN SODIUM 10000 [USP'U]/100ML
5-30 INJECTION, SOLUTION INTRAVENOUS CONTINUOUS
Status: DISCONTINUED | OUTPATIENT
Start: 2024-12-16 | End: 2024-12-17

## 2024-12-16 RX ORDER — ONDANSETRON 2 MG/ML
4 INJECTION INTRAMUSCULAR; INTRAVENOUS EVERY 6 HOURS PRN
Status: DISCONTINUED | OUTPATIENT
Start: 2024-12-16 | End: 2024-12-18 | Stop reason: HOSPADM

## 2024-12-16 RX ORDER — CLOPIDOGREL BISULFATE 75 MG/1
75 TABLET ORAL DAILY
Status: DISCONTINUED | OUTPATIENT
Start: 2024-12-17 | End: 2024-12-17

## 2024-12-16 RX ORDER — MUPIROCIN 20 MG/G
OINTMENT TOPICAL 3 TIMES DAILY
COMMUNITY

## 2024-12-16 RX ORDER — SODIUM CHLORIDE 0.9 % (FLUSH) 0.9 %
5-40 SYRINGE (ML) INJECTION EVERY 12 HOURS SCHEDULED
Status: DISCONTINUED | OUTPATIENT
Start: 2024-12-16 | End: 2024-12-18 | Stop reason: HOSPADM

## 2024-12-16 RX ORDER — POLYETHYLENE GLYCOL 3350 17 G/17G
17 POWDER, FOR SOLUTION ORAL DAILY PRN
Status: DISCONTINUED | OUTPATIENT
Start: 2024-12-16 | End: 2024-12-18 | Stop reason: HOSPADM

## 2024-12-16 RX ORDER — LEVOTHYROXINE SODIUM 50 UG/1
50 TABLET ORAL
Status: DISCONTINUED | OUTPATIENT
Start: 2024-12-17 | End: 2024-12-18 | Stop reason: HOSPADM

## 2024-12-16 RX ORDER — FAMOTIDINE 40 MG/1
40 TABLET, FILM COATED ORAL DAILY
COMMUNITY

## 2024-12-16 RX ORDER — SODIUM CHLORIDE 0.9 % (FLUSH) 0.9 %
5-40 SYRINGE (ML) INJECTION PRN
Status: DISCONTINUED | OUTPATIENT
Start: 2024-12-16 | End: 2024-12-18 | Stop reason: HOSPADM

## 2024-12-16 RX ORDER — ACETAMINOPHEN 500 MG
1000 TABLET ORAL
Status: COMPLETED | OUTPATIENT
Start: 2024-12-16 | End: 2024-12-16

## 2024-12-16 RX ORDER — GLUCAGON 1 MG/ML
1 KIT INJECTION PRN
Status: DISCONTINUED | OUTPATIENT
Start: 2024-12-16 | End: 2024-12-18 | Stop reason: HOSPADM

## 2024-12-16 RX ORDER — MAGNESIUM SULFATE IN WATER 40 MG/ML
2000 INJECTION, SOLUTION INTRAVENOUS PRN
Status: DISCONTINUED | OUTPATIENT
Start: 2024-12-16 | End: 2024-12-18 | Stop reason: HOSPADM

## 2024-12-16 RX ORDER — HYDROXYZINE PAMOATE 25 MG/1
25 CAPSULE ORAL 4 TIMES DAILY PRN
Status: DISCONTINUED | OUTPATIENT
Start: 2024-12-16 | End: 2024-12-18 | Stop reason: HOSPADM

## 2024-12-16 RX ORDER — ACETAMINOPHEN 325 MG/1
650 TABLET ORAL EVERY 6 HOURS PRN
Status: DISCONTINUED | OUTPATIENT
Start: 2024-12-16 | End: 2024-12-17

## 2024-12-16 RX ORDER — ATORVASTATIN CALCIUM 20 MG/1
20 TABLET, FILM COATED ORAL NIGHTLY
Status: DISCONTINUED | OUTPATIENT
Start: 2024-12-16 | End: 2024-12-17

## 2024-12-16 RX ORDER — LOSARTAN POTASSIUM 25 MG/1
25 TABLET ORAL DAILY
COMMUNITY

## 2024-12-16 RX ORDER — ACETAMINOPHEN 650 MG/1
650 SUPPOSITORY RECTAL EVERY 6 HOURS PRN
Status: DISCONTINUED | OUTPATIENT
Start: 2024-12-16 | End: 2024-12-18 | Stop reason: HOSPADM

## 2024-12-16 RX ORDER — NITROGLYCERIN 20 MG/100ML
5-200 INJECTION INTRAVENOUS CONTINUOUS
Status: DISCONTINUED | OUTPATIENT
Start: 2024-12-16 | End: 2024-12-18 | Stop reason: HOSPADM

## 2024-12-16 RX ORDER — SERTRALINE HYDROCHLORIDE 25 MG/1
25 TABLET, FILM COATED ORAL DAILY
Status: DISCONTINUED | OUTPATIENT
Start: 2024-12-17 | End: 2024-12-18 | Stop reason: HOSPADM

## 2024-12-16 RX ORDER — HYDROXYZINE PAMOATE 25 MG/1
25 CAPSULE ORAL 4 TIMES DAILY PRN
COMMUNITY

## 2024-12-16 RX ORDER — BUPROPION HYDROCHLORIDE 150 MG/1
150 TABLET ORAL EVERY MORNING
COMMUNITY

## 2024-12-16 RX ORDER — ISOSORBIDE MONONITRATE 30 MG/1
30 TABLET, EXTENDED RELEASE ORAL DAILY
Status: ON HOLD | COMMUNITY
End: 2024-12-18 | Stop reason: HOSPADM

## 2024-12-16 RX ORDER — FUROSEMIDE 20 MG/1
20 TABLET ORAL DAILY
COMMUNITY

## 2024-12-16 RX ORDER — SERTRALINE HYDROCHLORIDE 25 MG/1
25 TABLET, FILM COATED ORAL DAILY
COMMUNITY

## 2024-12-16 RX ORDER — ONDANSETRON 4 MG/1
4 TABLET, ORALLY DISINTEGRATING ORAL EVERY 8 HOURS PRN
Status: DISCONTINUED | OUTPATIENT
Start: 2024-12-16 | End: 2024-12-18 | Stop reason: HOSPADM

## 2024-12-16 RX ORDER — FUROSEMIDE 20 MG/1
20 TABLET ORAL DAILY
Status: DISCONTINUED | OUTPATIENT
Start: 2024-12-17 | End: 2024-12-18 | Stop reason: HOSPADM

## 2024-12-16 RX ORDER — FAMOTIDINE 20 MG/1
40 TABLET, FILM COATED ORAL DAILY
Status: DISCONTINUED | OUTPATIENT
Start: 2024-12-17 | End: 2024-12-18 | Stop reason: HOSPADM

## 2024-12-16 RX ORDER — CARVEDILOL 6.25 MG/1
12.5 TABLET ORAL 2 TIMES DAILY WITH MEALS
Status: DISCONTINUED | OUTPATIENT
Start: 2024-12-16 | End: 2024-12-18 | Stop reason: HOSPADM

## 2024-12-16 RX ORDER — HEPARIN SODIUM 1000 [USP'U]/ML
4000 INJECTION, SOLUTION INTRAVENOUS; SUBCUTANEOUS PRN
Status: DISCONTINUED | OUTPATIENT
Start: 2024-12-16 | End: 2024-12-18 | Stop reason: HOSPADM

## 2024-12-16 RX ORDER — NITROGLYCERIN 0.4 MG/1
0.4 TABLET SUBLINGUAL EVERY 5 MIN PRN
Status: DISCONTINUED | OUTPATIENT
Start: 2024-12-16 | End: 2024-12-18 | Stop reason: HOSPADM

## 2024-12-16 RX ORDER — CARVEDILOL 12.5 MG/1
12.5 TABLET ORAL 2 TIMES DAILY WITH MEALS
COMMUNITY

## 2024-12-16 RX ORDER — DEXTROSE MONOHYDRATE 100 MG/ML
INJECTION, SOLUTION INTRAVENOUS CONTINUOUS PRN
Status: DISCONTINUED | OUTPATIENT
Start: 2024-12-16 | End: 2024-12-18 | Stop reason: HOSPADM

## 2024-12-16 RX ORDER — SODIUM CHLORIDE 9 MG/ML
INJECTION, SOLUTION INTRAVENOUS PRN
Status: DISCONTINUED | OUTPATIENT
Start: 2024-12-16 | End: 2024-12-18 | Stop reason: HOSPADM

## 2024-12-16 RX ORDER — CLOPIDOGREL BISULFATE 75 MG/1
75 TABLET ORAL DAILY
Status: ON HOLD | COMMUNITY
End: 2024-12-18 | Stop reason: HOSPADM

## 2024-12-16 RX ORDER — ACYCLOVIR 800 MG/1
800 TABLET ORAL 2 TIMES DAILY
Status: DISCONTINUED | OUTPATIENT
Start: 2024-12-16 | End: 2024-12-18 | Stop reason: HOSPADM

## 2024-12-16 RX ORDER — ACYCLOVIR 800 MG/1
800 TABLET ORAL 2 TIMES DAILY
COMMUNITY

## 2024-12-16 RX ADMIN — NITROGLYCERIN 5 MCG/MIN: 20 INJECTION INTRAVENOUS at 14:01

## 2024-12-16 RX ADMIN — HEPARIN SODIUM 4000 UNITS: 1000 INJECTION INTRAVENOUS; SUBCUTANEOUS at 13:57

## 2024-12-16 RX ADMIN — HEPARIN SODIUM 8 UNITS/KG/HR: 10000 INJECTION, SOLUTION INTRAVENOUS at 13:59

## 2024-12-16 RX ADMIN — ACETAMINOPHEN 1000 MG: 500 TABLET ORAL at 13:57

## 2024-12-16 RX ADMIN — ATORVASTATIN CALCIUM 20 MG: 20 TABLET, FILM COATED ORAL at 20:31

## 2024-12-16 RX ADMIN — ACYCLOVIR 800 MG: 800 TABLET ORAL at 20:31

## 2024-12-16 RX ADMIN — INSULIN HUMAN 4 UNITS: 100 INJECTION, SOLUTION PARENTERAL at 18:03

## 2024-12-16 RX ADMIN — CEPHALEXIN 250 MG: 250 CAPSULE ORAL at 17:24

## 2024-12-16 RX ADMIN — CEPHALEXIN 250 MG: 250 CAPSULE ORAL at 20:31

## 2024-12-16 RX ADMIN — INSULIN HUMAN 32 UNITS: 100 INJECTION, SUSPENSION SUBCUTANEOUS at 20:37

## 2024-12-16 RX ADMIN — CARVEDILOL 12.5 MG: 6.25 TABLET, FILM COATED ORAL at 17:24

## 2024-12-16 RX ADMIN — NITROGLYCERIN 0.4 MG: 0.4 TABLET, ORALLY DISINTEGRATING SUBLINGUAL at 12:33

## 2024-12-16 ASSESSMENT — PAIN SCALES - GENERAL
PAINLEVEL_OUTOF10: 3
PAINLEVEL_OUTOF10: 5
PAINLEVEL_OUTOF10: 3
PAINLEVEL_OUTOF10: 3
PAINLEVEL_OUTOF10: 5

## 2024-12-16 ASSESSMENT — PAIN - FUNCTIONAL ASSESSMENT
PAIN_FUNCTIONAL_ASSESSMENT: 0-10

## 2024-12-16 ASSESSMENT — HEART SCORE: ECG: NORMAL

## 2024-12-16 ASSESSMENT — PAIN DESCRIPTION - LOCATION
LOCATION: CHEST

## 2024-12-16 ASSESSMENT — PAIN DESCRIPTION - ORIENTATION: ORIENTATION: LEFT

## 2024-12-16 NOTE — ED NOTES
Pt now reports 3/10 chest pain. Second dose held d/t resulting hypotension.   
Pt updated on POC. Pt given first dose of Nitro at this time. VSS  
Pt updated on admission status. Pt medicated per MAR. No needs expressed at this time. Respirations even and unlabored, call light within reach. VSS    
Left    C-SSRS:   Risk of Suicide: No Risk    Sepsis Screening:       Pretty Fall Risk:  Ridgeway 1 Fall Risk  Presents to emergency department  because of falls (Syncope, seizure, or loss of consciousness): No  Age > 70: No  Altered Mental Status, Intoxication with alcohol or substance confusion (Disorientation, impaired judgment, poor safety awaremess, or inability to follow instructions): No  Impaired Mobility: Ambulates or transfers with assistive devices or assistance; Unable to ambulate or transer.: No  Standard Fall Risk Interventions : [place] the patient to the environment, especially the location of the bathroom, Provide a safe environment by clearing a pathway free of plugs, IV poles, and other obstructing items, Call light and frequently used items within patient reach, Bed in lowest position and wheels locked, as applicable to the type of bed, when a patient is resting in bed, raise bed to a comfortable height when the patient is transferring out of bed, as appropriate    Swallow Screening        Preferred Language:   English      ALLERGIES     Meperidine, Meperidine hcl, Nitroglycerin, Pregabalin, and Promethazine    SURGICAL HISTORY       Past Surgical History:   Procedure Laterality Date    CABG WITH AORTIC VALVE REPAIR      CARDIAC SURGERY       SECTION      KIDNEY STONE SURGERY         PAST MEDICAL HISTORY       Past Medical History:   Diagnosis Date    CAD S/P percutaneous coronary angioplasty, 3 stents     Chronic depression     Chronic diastolic CHF (congestive heart failure) (HCC)     Diabetes mellitus, insulin dependent (IDDM), uncontrolled     Last Hgb A1c 14.3 on 2020    Diabetic polyneuropathy (HCC)     Fibromyalgia     Gastroesophageal reflux disease     Hepatitis C 2019    Heroin abuse (HCC)     Herpes     Kidney stone     Methamphetamine abuse (HCC)     Moderate episode of recurrent major depressive disorder (HCC)     Morbid obesity with BMI of 40.0-44.9, adult

## 2024-12-16 NOTE — PROCEDURES
PROCEDURE NOTE  Date: 12/16/2024   Name: Ashlee LUI Rudi  YOB: 1970    Procedures  EKG completed

## 2024-12-16 NOTE — ED PROVIDER NOTES
PATIENT NAME: Ashlee Grijalva  MRN: [id number]  : 1970  FULLER: 2024    I performed a history and physical examination of the patient and discussed management with the Resident. I reviewed the Resident's note and agree with the documented findings and plan of care. Any areas of disagreement are noted on the chart. I was personally present for the key portions of any procedures and have documented in the chart those procedures where I was not present during the key portions. I have reviewed the emergency nurses triage note and agree with the chief complaint, past medical history, past surgical history, allergies, medications, social and family history as documented unless otherwise noted below.    MEDICAL DEDISION MAKING (MDM)     Ashlee Grijalva is a 54 y.o. female who presents to Emergency Department with Chest Pain     Intermittent but crescendo chest pain over last 3 days duration.    Her PMH is remarkable for CAD with prior CABG and PCIs  Physical examination: AVSS. Nontoxic appearing. Heart: RRR, S1 and S2. Lungs CTAB. Soft abdomen w/o tenderness. Neurologically intact. No skin rashes.   EKG has no acute STEMI changes  Patient's history, exam, and ED workup are consistent with unstable angina.  Heart score is 7.  ASA is administered.  Nitro drip and heparin drip are started.  Admission is warranted.  Discussed with and admitted to hospital medicine service      Vitals:    24 1147 24 1232 24 1238 24 1356   BP: (!) 117/59 117/69 91/63 122/73   Pulse: 78 72 76 71   Resp: 20 16  17   Temp: 99.1 °F (37.3 °C)      TempSrc: Oral      SpO2: 97% 98%  98%   Weight: 115.7 kg (255 lb)      Height: 1.651 m (5' 5\")        Labs Reviewed   CBC WITH AUTO DIFFERENTIAL - Abnormal; Notable for the following components:       Result Value    WBC 11.1 (*)     MCV 80.7 (*)     All other components within normal limits   BASIC METABOLIC PANEL - Abnormal; Notable for the following components:    CO2 22 
   Fibromyalgia M79.7    Closed fracture of left ankle S82.892A    Chronic diastolic CHF (congestive heart failure) (HCC) I50.32    Essential hypertension I10    Chronic hepatitis C without hepatic coma (HCC) B18.2    Hypokalemia E87.6    Gastroesophageal reflux disease K21.9    Chronic depression F32.A    Morbid obesity E66.01    Hyponatremia E87.1    Hyperglycemia R73.9    Syncope and collapse R55    Methamphetamine abuse (HCC) F15.10    Abnormal thyroid function test R94.6    Polysubstance abuse (HCC) F19.10    Hypothyroidism E03.9    Obesity (BMI 30-39.9) E66.9    Moderate episode of recurrent major depressive disorder (HCC) F33.1     SURGICAL HISTORY       Past Surgical History:   Procedure Laterality Date    CABG WITH AORTIC VALVE REPAIR      CARDIAC SURGERY       SECTION      KIDNEY STONE SURGERY         CURRENT MEDICATIONS       Previous Medications    ACETAMINOPHEN (TYLENOL) 325 MG TABLET    Take 650 mg by mouth every 4 hours as needed    ASPIRIN 81 MG CHEWABLE TABLET    Take 81 mg by mouth daily    ATOMOXETINE (STRATTERA) 25 MG CAPSULE    Take 25 mg by mouth daily    ATORVASTATIN (LIPITOR) 20 MG TABLET    Take 1 tablet by mouth nightly    DIVALPROEX (DEPAKOTE) 125 MG DR TABLET    Take 1 tablet by mouth 2 times daily     GABAPENTIN (NEURONTIN) 300 MG CAPSULE    Take 300 mg by mouth 3 times daily.    INSULIN GLARGINE (BASAGLAR KWIKPEN) 100 UNIT/ML INJECTION PEN    Inject 25 Units into the skin 2 times daily     INSULIN LISPRO (ADMELOG) 100 UNIT/ML INJECTION VIAL    Inject 15 Units into the skin 3 times daily (before meals) \"per sliding scale\" according to patient    LANSOPRAZOLE (PREVACID) 15 MG DELAYED RELEASE CAPSULE    Take 15 mg by mouth daily as needed (heartburn)     LEVOTHYROXINE (SYNTHROID) 50 MCG TABLET    Take 50 mcg by mouth every morning (before breakfast)    MECLIZINE (ANTIVERT) 12.5 MG TABLET    Take 12.5 mg by mouth every morning (before breakfast)    MELATONIN 10 MG TABS    Take 10 mg

## 2024-12-16 NOTE — ED TRIAGE NOTES
Pt presents to the ER with c/o left sided chest pain that started on Friday. Pt states it radiates up her neck and into her shoulder blade. Pt reports hx of 5 heart attacks. Pt has an appt with her cardiologist tomorrow. Pt received 3 ASA and 1 nitro in route. Pt states her chest pain went from a 6/10 to a 5/10. Pt is alert and oriented, respirations even and unlabored. VSS

## 2024-12-17 ENCOUNTER — APPOINTMENT (OUTPATIENT)
Age: 54
DRG: 174 | End: 2024-12-17
Attending: STUDENT IN AN ORGANIZED HEALTH CARE EDUCATION/TRAINING PROGRAM
Payer: MEDICAID

## 2024-12-17 PROBLEM — I21.4 NSTEMI (NON-ST ELEVATED MYOCARDIAL INFARCTION) (HCC): Status: ACTIVE | Noted: 2024-12-17

## 2024-12-17 LAB
ABO: NORMAL
ACTIVATED CLOTTING TIME: 273 SECONDS (ref 1–150)
ALBUMIN SERPL BCG-MCNC: 3.5 G/DL (ref 3.5–5.1)
ALP SERPL-CCNC: 99 U/L (ref 38–126)
ALT SERPL W/O P-5'-P-CCNC: 22 U/L (ref 11–66)
AMPHETAMINES UR QL SCN: NEGATIVE
ANION GAP SERPL CALC-SCNC: 12 MEQ/L (ref 8–16)
ANTIBODY SCREEN: NORMAL
AST SERPL-CCNC: 29 U/L (ref 5–40)
BARBITURATES UR QL SCN: NEGATIVE
BENZODIAZ UR QL SCN: NEGATIVE
BILIRUB SERPL-MCNC: 0.4 MG/DL (ref 0.3–1.2)
BUN SERPL-MCNC: 21 MG/DL (ref 7–22)
BZE UR QL SCN: NEGATIVE
CALCIUM SERPL-MCNC: 9.2 MG/DL (ref 8.5–10.5)
CANNABINOIDS UR QL SCN: NEGATIVE
CHLORIDE SERPL-SCNC: 105 MEQ/L (ref 98–111)
CHOLEST SERPL-MCNC: 126 MG/DL (ref 100–199)
CO2 SERPL-SCNC: 24 MEQ/L (ref 23–33)
CREAT SERPL-MCNC: 1.1 MG/DL (ref 0.4–1.2)
ECHO AO ASC DIAM: 2.9 CM
ECHO AO ASCENDING AORTA INDEX: 1.32 CM/M2
ECHO AV CUSP MM: 1.4 CM
ECHO AV MEAN GRADIENT: 5 MMHG
ECHO AV MEAN VELOCITY: 1.1 M/S
ECHO AV PEAK GRADIENT: 9 MMHG
ECHO AV PEAK VELOCITY: 1.5 M/S
ECHO AV VELOCITY RATIO: 0.6
ECHO AV VTI: 32.1 CM
ECHO BSA: 2.3 M2
ECHO EST RA PRESSURE: 3 MMHG
ECHO LA AREA 2C: 15.9 CM2
ECHO LA AREA 4C: 15.4 CM2
ECHO LA DIAMETER INDEX: 1.87 CM/M2
ECHO LA DIAMETER: 4.1 CM
ECHO LA MAJOR AXIS: 5.3 CM
ECHO LA MINOR AXIS: 5.3 CM
ECHO LA VOL BP: 38 ML (ref 22–52)
ECHO LA VOL MOD A2C: 39 ML (ref 22–52)
ECHO LA VOL MOD A4C: 36 ML (ref 22–52)
ECHO LA VOL/BSA BIPLANE: 17 ML/M2 (ref 16–34)
ECHO LA VOLUME INDEX MOD A2C: 18 ML/M2 (ref 16–34)
ECHO LA VOLUME INDEX MOD A4C: 16 ML/M2 (ref 16–34)
ECHO LV E' LATERAL VELOCITY: 8.8 CM/S
ECHO LV E' SEPTAL VELOCITY: 4.8 CM/S
ECHO LV EDV A2C: 51 ML
ECHO LV EDV A4C: 56 ML
ECHO LV EDV INDEX A4C: 26 ML/M2
ECHO LV EDV NDEX A2C: 23 ML/M2
ECHO LV EJECTION FRACTION A2C: 59 %
ECHO LV EJECTION FRACTION A4C: 61 %
ECHO LV EJECTION FRACTION BIPLANE: 60 % (ref 55–100)
ECHO LV ESV A2C: 21 ML
ECHO LV ESV A4C: 22 ML
ECHO LV ESV INDEX A2C: 10 ML/M2
ECHO LV ESV INDEX A4C: 10 ML/M2
ECHO LV FRACTIONAL SHORTENING: 33 % (ref 28–44)
ECHO LV INTERNAL DIMENSION DIASTOLE INDEX: 1.78 CM/M2
ECHO LV INTERNAL DIMENSION DIASTOLIC: 3.9 CM (ref 3.9–5.3)
ECHO LV INTERNAL DIMENSION SYSTOLIC INDEX: 1.19 CM/M2
ECHO LV INTERNAL DIMENSION SYSTOLIC: 2.6 CM
ECHO LV ISOVOLUMETRIC RELAXATION TIME (IVRT): 88 MS
ECHO LV IVSD: 1.2 CM (ref 0.6–0.9)
ECHO LV MASS 2D: 159.3 G (ref 67–162)
ECHO LV MASS INDEX 2D: 72.7 G/M2 (ref 43–95)
ECHO LV POSTERIOR WALL DIASTOLIC: 1.2 CM (ref 0.6–0.9)
ECHO LV RELATIVE WALL THICKNESS RATIO: 0.62
ECHO LVOT AV VTI INDEX: 0.64
ECHO LVOT MEAN GRADIENT: 2 MMHG
ECHO LVOT PEAK GRADIENT: 3 MMHG
ECHO LVOT PEAK VELOCITY: 0.9 M/S
ECHO LVOT VTI: 20.5 CM
ECHO MV A VELOCITY: 1.03 M/S
ECHO MV E DECELERATION TIME (DT): 324 MS
ECHO MV E VELOCITY: 0.75 M/S
ECHO MV E/A RATIO: 0.73
ECHO MV E/E' LATERAL: 8.52
ECHO MV E/E' RATIO (AVERAGED): 12.07
ECHO MV E/E' SEPTAL: 15.63
ECHO PV MAX VELOCITY: 0.7 M/S
ECHO PV PEAK GRADIENT: 2 MMHG
ECHO RIGHT VENTRICULAR SYSTOLIC PRESSURE (RVSP): 24 MMHG
ECHO RV INTERNAL DIMENSION: 2.4 CM
ECHO RV TAPSE: 1.7 CM (ref 1.7–?)
ECHO TV E WAVE: 0.7 M/S
ECHO TV REGURGITANT MAX VELOCITY: 2.27 M/S
ECHO TV REGURGITANT PEAK GRADIENT: 21 MMHG
EKG ATRIAL RATE: 62 BPM
EKG ATRIAL RATE: 68 BPM
EKG ATRIAL RATE: 69 BPM
EKG P AXIS: 29 DEGREES
EKG P AXIS: 41 DEGREES
EKG P AXIS: 9 DEGREES
EKG P-R INTERVAL: 152 MS
EKG P-R INTERVAL: 152 MS
EKG P-R INTERVAL: 162 MS
EKG Q-T INTERVAL: 444 MS
EKG Q-T INTERVAL: 454 MS
EKG Q-T INTERVAL: 458 MS
EKG QRS DURATION: 110 MS
EKG QRS DURATION: 110 MS
EKG QRS DURATION: 116 MS
EKG QTC CALCULATION (BAZETT): 450 MS
EKG QTC CALCULATION (BAZETT): 486 MS
EKG QTC CALCULATION (BAZETT): 487 MS
EKG R AXIS: -24 DEGREES
EKG R AXIS: -29 DEGREES
EKG R AXIS: -51 DEGREES
EKG T AXIS: 50 DEGREES
EKG T AXIS: 72 DEGREES
EKG T AXIS: 94 DEGREES
EKG VENTRICULAR RATE: 62 BPM
EKG VENTRICULAR RATE: 68 BPM
EKG VENTRICULAR RATE: 69 BPM
FENTANYL: NEGATIVE
GFR SERPL CREATININE-BSD FRML MDRD: 60 ML/MIN/1.73M2
GLUCOSE BLD STRIP.AUTO-MCNC: 111 MG/DL (ref 70–108)
GLUCOSE BLD STRIP.AUTO-MCNC: 117 MG/DL (ref 70–108)
GLUCOSE BLD STRIP.AUTO-MCNC: 186 MG/DL (ref 70–108)
GLUCOSE BLD STRIP.AUTO-MCNC: 224 MG/DL (ref 70–108)
GLUCOSE SERPL-MCNC: 118 MG/DL (ref 70–108)
HDLC SERPL-MCNC: 54 MG/DL
HEPARIN UNFRACTIONATED: 0.32 U/ML (ref 0.3–0.7)
INR PPP: 0.95 (ref 0.85–1.13)
LDLC SERPL CALC-MCNC: 57 MG/DL
OPIATES UR QL SCN: NEGATIVE
OXYCODONE: NEGATIVE
PCP UR QL SCN: NEGATIVE
POTASSIUM SERPL-SCNC: 4.5 MEQ/L (ref 3.5–5.2)
PROT SERPL-MCNC: 6.4 G/DL (ref 6.1–8)
RH FACTOR: NORMAL
SODIUM SERPL-SCNC: 141 MEQ/L (ref 135–145)
TRIGL SERPL-MCNC: 77 MG/DL (ref 0–199)
TROPONIN, HIGH SENSITIVITY: 41 NG/L (ref 0–12)

## 2024-12-17 PROCEDURE — B2111ZZ FLUOROSCOPY OF MULTIPLE CORONARY ARTERIES USING LOW OSMOLAR CONTRAST: ICD-10-PCS | Performed by: INTERNAL MEDICINE

## 2024-12-17 PROCEDURE — 86850 RBC ANTIBODY SCREEN: CPT

## 2024-12-17 PROCEDURE — C1769 GUIDE WIRE: HCPCS | Performed by: INTERNAL MEDICINE

## 2024-12-17 PROCEDURE — 93010 ELECTROCARDIOGRAM REPORT: CPT | Performed by: NUCLEAR MEDICINE

## 2024-12-17 PROCEDURE — 2709999900 HC NON-CHARGEABLE SUPPLY: Performed by: INTERNAL MEDICINE

## 2024-12-17 PROCEDURE — 85520 HEPARIN ASSAY: CPT

## 2024-12-17 PROCEDURE — 85610 PROTHROMBIN TIME: CPT

## 2024-12-17 PROCEDURE — 6370000000 HC RX 637 (ALT 250 FOR IP): Performed by: STUDENT IN AN ORGANIZED HEALTH CARE EDUCATION/TRAINING PROGRAM

## 2024-12-17 PROCEDURE — C1887 CATHETER, GUIDING: HCPCS | Performed by: INTERNAL MEDICINE

## 2024-12-17 PROCEDURE — 86901 BLOOD TYPING SEROLOGIC RH(D): CPT

## 2024-12-17 PROCEDURE — 82948 REAGENT STRIP/BLOOD GLUCOSE: CPT

## 2024-12-17 PROCEDURE — C9600 PERC DRUG-EL COR STENT SING: HCPCS | Performed by: INTERNAL MEDICINE

## 2024-12-17 PROCEDURE — 1200000003 HC TELEMETRY R&B

## 2024-12-17 PROCEDURE — C1894 INTRO/SHEATH, NON-LASER: HCPCS | Performed by: INTERNAL MEDICINE

## 2024-12-17 PROCEDURE — 2580000003 HC RX 258: Performed by: STUDENT IN AN ORGANIZED HEALTH CARE EDUCATION/TRAINING PROGRAM

## 2024-12-17 PROCEDURE — 80307 DRUG TEST PRSMV CHEM ANLYZR: CPT

## 2024-12-17 PROCEDURE — 93455 CORONARY ART/GRFT ANGIO S&I: CPT | Performed by: INTERNAL MEDICINE

## 2024-12-17 PROCEDURE — B2121ZZ FLUOROSCOPY OF SINGLE CORONARY ARTERY BYPASS GRAFT USING LOW OSMOLAR CONTRAST: ICD-10-PCS | Performed by: INTERNAL MEDICINE

## 2024-12-17 PROCEDURE — 4A023N7 MEASUREMENT OF CARDIAC SAMPLING AND PRESSURE, LEFT HEART, PERCUTANEOUS APPROACH: ICD-10-PCS | Performed by: INTERNAL MEDICINE

## 2024-12-17 PROCEDURE — 99152 MOD SED SAME PHYS/QHP 5/>YRS: CPT | Performed by: INTERNAL MEDICINE

## 2024-12-17 PROCEDURE — 93306 TTE W/DOPPLER COMPLETE: CPT | Performed by: INTERNAL MEDICINE

## 2024-12-17 PROCEDURE — 99153 MOD SED SAME PHYS/QHP EA: CPT | Performed by: INTERNAL MEDICINE

## 2024-12-17 PROCEDURE — 99232 SBSQ HOSP IP/OBS MODERATE 35: CPT | Performed by: NURSE PRACTITIONER

## 2024-12-17 PROCEDURE — 99223 1ST HOSP IP/OBS HIGH 75: CPT | Performed by: INTERNAL MEDICINE

## 2024-12-17 PROCEDURE — 6370000000 HC RX 637 (ALT 250 FOR IP)

## 2024-12-17 PROCEDURE — 86900 BLOOD TYPING SEROLOGIC ABO: CPT

## 2024-12-17 PROCEDURE — C1874 STENT, COATED/COV W/DEL SYS: HCPCS | Performed by: INTERNAL MEDICINE

## 2024-12-17 PROCEDURE — C1725 CATH, TRANSLUMIN NON-LASER: HCPCS | Performed by: INTERNAL MEDICINE

## 2024-12-17 PROCEDURE — C1760 CLOSURE DEV, VASC: HCPCS | Performed by: INTERNAL MEDICINE

## 2024-12-17 PROCEDURE — 93005 ELECTROCARDIOGRAM TRACING: CPT | Performed by: INTERNAL MEDICINE

## 2024-12-17 PROCEDURE — 85347 COAGULATION TIME ACTIVATED: CPT

## 2024-12-17 PROCEDURE — 6360000004 HC RX CONTRAST MEDICATION: Performed by: INTERNAL MEDICINE

## 2024-12-17 PROCEDURE — 36415 COLL VENOUS BLD VENIPUNCTURE: CPT

## 2024-12-17 PROCEDURE — 6360000002 HC RX W HCPCS: Performed by: INTERNAL MEDICINE

## 2024-12-17 PROCEDURE — 93306 TTE W/DOPPLER COMPLETE: CPT

## 2024-12-17 PROCEDURE — 2580000003 HC RX 258: Performed by: INTERNAL MEDICINE

## 2024-12-17 PROCEDURE — 80061 LIPID PANEL: CPT

## 2024-12-17 PROCEDURE — 84484 ASSAY OF TROPONIN QUANT: CPT

## 2024-12-17 PROCEDURE — 80053 COMPREHEN METABOLIC PANEL: CPT

## 2024-12-17 PROCEDURE — 027135Z DILATION OF CORONARY ARTERY, TWO ARTERIES WITH TWO DRUG-ELUTING INTRALUMINAL DEVICES, PERCUTANEOUS APPROACH: ICD-10-PCS | Performed by: INTERNAL MEDICINE

## 2024-12-17 PROCEDURE — 6370000000 HC RX 637 (ALT 250 FOR IP): Performed by: INTERNAL MEDICINE

## 2024-12-17 DEVICE — STENT ONYXNG30015UX ONYX 3.00X15RX
Type: IMPLANTABLE DEVICE | Status: FUNCTIONAL
Brand: ONYX FRONTIER™

## 2024-12-17 DEVICE — STENT ONYXNG20018UX ONYX 2.00X18RX
Type: IMPLANTABLE DEVICE | Status: FUNCTIONAL
Brand: ONYX FRONTIER™

## 2024-12-17 RX ORDER — ASPIRIN 81 MG/1
81 TABLET, CHEWABLE ORAL DAILY
Status: DISCONTINUED | OUTPATIENT
Start: 2024-12-18 | End: 2024-12-18 | Stop reason: HOSPADM

## 2024-12-17 RX ORDER — SODIUM CHLORIDE 9 MG/ML
INJECTION, SOLUTION INTRAVENOUS CONTINUOUS
Status: DISCONTINUED | OUTPATIENT
Start: 2024-12-17 | End: 2024-12-17

## 2024-12-17 RX ORDER — SODIUM CHLORIDE 9 MG/ML
INJECTION, SOLUTION INTRAVENOUS PRN
Status: DISCONTINUED | OUTPATIENT
Start: 2024-12-17 | End: 2024-12-18 | Stop reason: HOSPADM

## 2024-12-17 RX ORDER — IOPAMIDOL 755 MG/ML
INJECTION, SOLUTION INTRAVASCULAR PRN
Status: DISCONTINUED | OUTPATIENT
Start: 2024-12-17 | End: 2024-12-17 | Stop reason: HOSPADM

## 2024-12-17 RX ORDER — SODIUM CHLORIDE 0.9 % (FLUSH) 0.9 %
5-40 SYRINGE (ML) INJECTION EVERY 12 HOURS SCHEDULED
Status: DISCONTINUED | OUTPATIENT
Start: 2024-12-17 | End: 2024-12-18 | Stop reason: HOSPADM

## 2024-12-17 RX ORDER — SODIUM CHLORIDE 0.9 % (FLUSH) 0.9 %
5-40 SYRINGE (ML) INJECTION PRN
Status: DISCONTINUED | OUTPATIENT
Start: 2024-12-17 | End: 2024-12-18 | Stop reason: HOSPADM

## 2024-12-17 RX ORDER — HEPARIN SODIUM 1000 [USP'U]/ML
INJECTION, SOLUTION INTRAVENOUS; SUBCUTANEOUS PRN
Status: DISCONTINUED | OUTPATIENT
Start: 2024-12-17 | End: 2024-12-17 | Stop reason: HOSPADM

## 2024-12-17 RX ORDER — ATORVASTATIN CALCIUM 80 MG/1
80 TABLET, FILM COATED ORAL NIGHTLY
Status: DISCONTINUED | OUTPATIENT
Start: 2024-12-17 | End: 2024-12-18 | Stop reason: HOSPADM

## 2024-12-17 RX ORDER — LIDOCAINE HYDROCHLORIDE 20 MG/ML
INJECTION, SOLUTION EPIDURAL; INFILTRATION; INTRACAUDAL; PERINEURAL PRN
Status: DISCONTINUED | OUTPATIENT
Start: 2024-12-17 | End: 2024-12-17 | Stop reason: HOSPADM

## 2024-12-17 RX ORDER — ACETAMINOPHEN 325 MG/1
650 TABLET ORAL EVERY 4 HOURS PRN
Status: DISCONTINUED | OUTPATIENT
Start: 2024-12-17 | End: 2024-12-18 | Stop reason: HOSPADM

## 2024-12-17 RX ORDER — NITROGLYCERIN 20 MG/100ML
INJECTION INTRAVENOUS CONTINUOUS PRN
Status: COMPLETED | OUTPATIENT
Start: 2024-12-17 | End: 2024-12-17

## 2024-12-17 RX ORDER — FENTANYL CITRATE 50 UG/ML
INJECTION, SOLUTION INTRAMUSCULAR; INTRAVENOUS PRN
Status: DISCONTINUED | OUTPATIENT
Start: 2024-12-17 | End: 2024-12-17 | Stop reason: HOSPADM

## 2024-12-17 RX ORDER — MIDAZOLAM HYDROCHLORIDE 1 MG/ML
INJECTION, SOLUTION INTRAMUSCULAR; INTRAVENOUS PRN
Status: DISCONTINUED | OUTPATIENT
Start: 2024-12-17 | End: 2024-12-17 | Stop reason: HOSPADM

## 2024-12-17 RX ADMIN — LEVOTHYROXINE SODIUM 50 MCG: 0.05 TABLET ORAL at 06:40

## 2024-12-17 RX ADMIN — SODIUM CHLORIDE: 9 INJECTION, SOLUTION INTRAVENOUS at 12:38

## 2024-12-17 RX ADMIN — CEPHALEXIN 250 MG: 250 CAPSULE ORAL at 09:07

## 2024-12-17 RX ADMIN — SERTRALINE 25 MG: 25 TABLET, FILM COATED ORAL at 09:07

## 2024-12-17 RX ADMIN — FAMOTIDINE 40 MG: 20 TABLET, FILM COATED ORAL at 09:07

## 2024-12-17 RX ADMIN — ACYCLOVIR 800 MG: 800 TABLET ORAL at 09:07

## 2024-12-17 RX ADMIN — INSULIN HUMAN 32 UNITS: 100 INJECTION, SUSPENSION SUBCUTANEOUS at 20:11

## 2024-12-17 RX ADMIN — ACETAMINOPHEN 650 MG: 325 TABLET ORAL at 20:36

## 2024-12-17 RX ADMIN — ACETAMINOPHEN 650 MG: 325 TABLET ORAL at 06:40

## 2024-12-17 RX ADMIN — ATORVASTATIN CALCIUM 80 MG: 80 TABLET, FILM COATED ORAL at 20:36

## 2024-12-17 RX ADMIN — CLOPIDOGREL BISULFATE 75 MG: 75 TABLET ORAL at 09:07

## 2024-12-17 RX ADMIN — INSULIN HUMAN 4 UNITS: 100 INJECTION, SOLUTION PARENTERAL at 09:06

## 2024-12-17 RX ADMIN — FUROSEMIDE 20 MG: 20 TABLET ORAL at 09:08

## 2024-12-17 RX ADMIN — ACYCLOVIR 800 MG: 800 TABLET ORAL at 20:38

## 2024-12-17 RX ADMIN — ONDANSETRON 4 MG: 4 TABLET, ORALLY DISINTEGRATING ORAL at 19:54

## 2024-12-17 RX ADMIN — ASPIRIN 81 MG: 81 TABLET, CHEWABLE ORAL at 09:07

## 2024-12-17 RX ADMIN — CARVEDILOL 12.5 MG: 6.25 TABLET, FILM COATED ORAL at 09:07

## 2024-12-17 RX ADMIN — SODIUM CHLORIDE, PRESERVATIVE FREE 10 ML: 5 INJECTION INTRAVENOUS at 09:08

## 2024-12-17 RX ADMIN — CARVEDILOL 12.5 MG: 6.25 TABLET, FILM COATED ORAL at 18:59

## 2024-12-17 ASSESSMENT — PAIN DESCRIPTION - ORIENTATION
ORIENTATION: RIGHT
ORIENTATION: MID;LOWER;RIGHT

## 2024-12-17 ASSESSMENT — PAIN DESCRIPTION - DESCRIPTORS
DESCRIPTORS: ACHING
DESCRIPTORS: ACHING;POUNDING

## 2024-12-17 ASSESSMENT — PAIN SCALES - GENERAL
PAINLEVEL_OUTOF10: 6
PAINLEVEL_OUTOF10: 1
PAINLEVEL_OUTOF10: 7
PAINLEVEL_OUTOF10: 0

## 2024-12-17 ASSESSMENT — PAIN DESCRIPTION - LOCATION
LOCATION: SHOULDER
LOCATION: BACK;GROIN

## 2024-12-17 ASSESSMENT — PAIN - FUNCTIONAL ASSESSMENT: PAIN_FUNCTIONAL_ASSESSMENT: ACTIVITIES ARE NOT PREVENTED

## 2024-12-17 NOTE — CONSULTS
pamoate (VISTARIL) capsule 25 mg  25 mg Oral 4x Daily PRN Anam Mcarthur MD        insulin NPH (HumuLIN N;NovoLIN N) injection vial 32 Units  32 Units SubCUTAneous Nightly Anam Macrthur MD   32 Units at 12/16/24 2037    insulin regular (HumuLIN R;NovoLIN R) injection 4 Units  4 Units SubCUTAneous BID AC Anam Mcarthur MD   4 Units at 12/16/24 1803    levothyroxine (SYNTHROID) tablet 50 mcg  50 mcg Oral QAM AC Anam Mcarthur MD   50 mcg at 12/17/24 0640    [Held by provider] losartan (COZAAR) tablet 25 mg  25 mg Oral Daily Lashonda Bonilla DO        sertraline (ZOLOFT) tablet 25 mg  25 mg Oral Daily Anam Mcarthur MD        glucose chewable tablet 16 g  4 tablet Oral PRN Anam Mcarthur MD        dextrose bolus 10% 125 mL  125 mL IntraVENous PRN Anam Mcarthur MD        Or    dextrose bolus 10% 250 mL  250 mL IntraVENous PRN Anam Mcarthur MD        glucagon injection 1 mg  1 mg SubCUTAneous PRN Anam Mcarthur MD        dextrose 10 % infusion   IntraVENous Continuous PRN Anam Mcarthur  mL/hr at 12/16/24 2045 Rate Verify at 12/16/24 2045    [Held by provider] isosorbide mononitrate (IMDUR) extended release tablet 60 mg  60 mg Oral Daily José Luis Ruiz DO         Prior to Admission medications    Medication Sig Start Date End Date Taking? Authorizing Provider   acyclovir (ZOVIRAX) 800 MG tablet Take 1 tablet by mouth 2 times daily   Yes Provider, MD David   carvedilol (COREG) 12.5 MG tablet Take 1 tablet by mouth 2 times daily (with meals)   Yes Provider, Historical, MD   cephALEXin (KEFLEX) 250 MG capsule Take 1 capsule by mouth 4 times daily   Yes Provider, MD David   clopidogrel (PLAVIX) 75 MG tablet Take 1 tablet by mouth daily   Yes Provider, Historical, MD   famotidine (PEPCID) 40 MG tablet Take 1 tablet by mouth daily   Yes Provider, MD David   furosemide (LASIX) 20 MG tablet Take 1 tablet by mouth daily   Yes Provider, Historical, MD   hydrOXYzine pamoate

## 2024-12-17 NOTE — H&P
(STRATTERA) 25 MG capsule Take 25 mg by mouth daily  Patient not taking: Reported on 12/16/2024 1/10/19   David Woods MD   divalproex (DEPAKOTE) 125 MG DR tablet Take 1 tablet by mouth 2 times daily   Patient not taking: Reported on 12/16/2024 11/8/19   David Woods MD   TRULICITY 0.75 MG/0.5ML SOPN Inject 1 pen into the skin every 7 days On fridays  Patient not taking: Reported on 12/16/2024 11/8/19   David Woods MD   gabapentin (NEURONTIN) 300 MG capsule Take 300 mg by mouth 3 times daily.  Patient not taking: Reported on 12/16/2024 1/10/19   David Woods MD   insulin glargine (BASAGLAR KWIKPEN) 100 UNIT/ML injection pen Inject 25 Units into the skin 2 times daily   Patient not taking: Reported on 12/16/2024    David Woods MD   lansoprazole (PREVACID) 15 MG delayed release capsule Take 15 mg by mouth daily as needed (heartburn)   Patient not taking: Reported on 12/16/2024 12/27/18   David Woods MD   meclizine (ANTIVERT) 12.5 MG tablet Take 12.5 mg by mouth every morning (before breakfast)  Patient not taking: Reported on 12/16/2024 6/18/19   David Woods MD   minocycline (MINOCIN;DYNACIN) 100 MG capsule Take 1 capsule by mouth 2 times daily  Patient not taking: Reported on 12/16/2024 11/8/19   David Woods MD   mirtazapine (REMERON) 15 MG tablet Take 1 tablet by mouth nightly  Patient not taking: Reported on 12/16/2024 11/8/19   David Woods MD   OLANZapine (ZYPREXA) 5 MG tablet Take 1 tablet by mouth nightly  Patient not taking: Reported on 12/16/2024 11/8/19   David Woods MD   ondansetron (ZOFRAN-ODT) 4 MG disintegrating tablet Take 4 mg by mouth every 6 hours as needed  Patient not taking: Reported on 12/16/2024 8/7/19   David Woods MD   venlafaxine (EFFEXOR XR) 75 MG extended release capsule Take 1 capsule by mouth daily  Patient not taking: Reported on 12/16/2024 11/8/19   Provider, MD David 
Last Year: No     Number of Times Moved in the Last Year: 0     Homeless in the Last Year: No        Code status: Full Code     Electronically signed by Anam Mcarthur MD on 12/16/2024 at 6:41 PM.   Case was discussed with the Attending, Dr. José Luis Ruiz

## 2024-12-17 NOTE — PROCEDURES
PROCEDURE NOTE  Date: 12/16/2024   Name: Ashlee Grijalva  YOB: 1970    Procedures  12 lead EKG completed. Results handed to Zora Jerome CET

## 2024-12-17 NOTE — PLAN OF CARE
Problem: Chronic Conditions and Co-morbidities  Goal: Patient's chronic conditions and co-morbidity symptoms are monitored and maintained or improved  Outcome: Progressing  Flowsheets  Taken 12/16/2024 2328 by Delmis Silva RN  Care Plan - Patient's Chronic Conditions and Co-Morbidity Symptoms are Monitored and Maintained or Improved:   Monitor and assess patient's chronic conditions and comorbid symptoms for stability, deterioration, or improvement   Collaborate with multidisciplinary team to address chronic and comorbid conditions and prevent exacerbation or deterioration   Update acute care plan with appropriate goals if chronic or comorbid symptoms are exacerbated and prevent overall improvement and discharge  Taken 12/16/2024 1445 by Eloisa Ferguson RN  Care Plan - Patient's Chronic Conditions and Co-Morbidity Symptoms are Monitored and Maintained or Improved: Monitor and assess patient's chronic conditions and comorbid symptoms for stability, deterioration, or improvement     Problem: Discharge Planning  Goal: Discharge to home or other facility with appropriate resources  Outcome: Progressing  Flowsheets  Taken 12/16/2024 2328 by Delmis Silva RN  Discharge to home or other facility with appropriate resources:   Identify barriers to discharge with patient and caregiver   Arrange for needed discharge resources and transportation as appropriate   Identify discharge learning needs (meds, wound care, etc)  Taken 12/16/2024 1525 by Zora Moreland RN  Discharge to home or other facility with appropriate resources:   Identify barriers to discharge with patient and caregiver   Identify discharge learning needs (meds, wound care, etc)   Refer to discharge planning if patient needs post-hospital services based on physician order or complex needs related to functional status, cognitive ability or social support system  Taken 12/16/2024 1445 by Eloisa Ferguson RN  Discharge to home or other facility with

## 2024-12-17 NOTE — PLAN OF CARE
IM Resident/Hospitalist Plan of Care Note     Notified by RN of BP 95/53, MAP 67, while on nitro drip.  Nitro drip held at this time.  Also holding home losartan and adding hold parameters to home Coreg.  Assessed patient at bedside, patient states that she is having dizziness, however she states that this has been present since chest pain began which prompted admission.  No change in dizziness or lightheadedness.  Chest pain currently 3/10.  Will continue to hold nitro drip at this time.  Discussed with RN.  Will continue to monitor BP and chest pain.    Electronically signed by Lashonda Bonilla DO on 12/16/2024 at 9:16 PM

## 2024-12-17 NOTE — PROCEDURES
PROCEDURE NOTE  Date: 12/17/2024   Name: Ashlee LUI Rudi  YOB: 1970    Procedures EKG completed, given to RN

## 2024-12-17 NOTE — BRIEF OP NOTE
[facility]  Sedation/Analgesia Post Sedation Record    Pt Name: Ashlee Grijalva  Account number: [id number]  MRN: [id number]  YOB: 1970  Procedure Performed By: Primo Gil MD MD FACC, FSCAI, RPVI  Primary Care Physician: Bhavya Ojeda APRN - CNP  Date: 12/17/2024    POST-PROCEDURE    Physicians/Assistants: Primo Gil MD, MAGDALENA, [facility], RPVI    Procedure Performed: PCI    Sedation/Anesthesia: Versed/ Fentanyl and 2% xylocaine local anesthesia.      Estimated Blood Loss: < 50 ml.     Specimens Removed: None         Disposition of Specimen: N/A        Complications: No Immediate Complications.       Post-procedure Diagnosis/Findings:       PCI   DAPT  GDMT for CAD        Electronically signed by Primo Gil MD on 12/17/24 at 6:14 PM EST   Interventional Cardiology

## 2024-12-17 NOTE — PROCEDURES
PROCEDURE NOTE  Date: 12/16/2024   Name: Ashlee LUI Rudi  YOB: 1970    Procedures        EKG was completed and handed to RN

## 2024-12-18 VITALS
DIASTOLIC BLOOD PRESSURE: 65 MMHG | HEART RATE: 67 BPM | OXYGEN SATURATION: 96 % | RESPIRATION RATE: 18 BRPM | BODY MASS INDEX: 42.49 KG/M2 | SYSTOLIC BLOOD PRESSURE: 140 MMHG | HEIGHT: 65 IN | WEIGHT: 255 LBS | TEMPERATURE: 97.9 F

## 2024-12-18 PROBLEM — Z98.61 PERCUTANEOUS TRANSLUMINAL CORONARY ANGIOPLASTY STATUS: Status: ACTIVE | Noted: 2024-12-18

## 2024-12-18 LAB
ANION GAP SERPL CALC-SCNC: 13 MEQ/L (ref 8–16)
BUN SERPL-MCNC: 20 MG/DL (ref 7–22)
CALCIUM SERPL-MCNC: 9.2 MG/DL (ref 8.5–10.5)
CHLORIDE SERPL-SCNC: 103 MEQ/L (ref 98–111)
CHOLEST SERPL-MCNC: 133 MG/DL (ref 100–199)
CO2 SERPL-SCNC: 21 MEQ/L (ref 23–33)
CREAT SERPL-MCNC: 0.9 MG/DL (ref 0.4–1.2)
ECHO BSA: 2.3 M2
GFR SERPL CREATININE-BSD FRML MDRD: 76 ML/MIN/1.73M2
GLUCOSE BLD STRIP.AUTO-MCNC: 171 MG/DL (ref 70–108)
GLUCOSE BLD STRIP.AUTO-MCNC: 339 MG/DL (ref 70–108)
GLUCOSE SERPL-MCNC: 211 MG/DL (ref 70–108)
HDLC SERPL-MCNC: 52 MG/DL
HEPARIN UNFRACTIONATED: < 0.04 U/ML (ref 0.3–0.7)
LDLC SERPL CALC-MCNC: 55 MG/DL
POTASSIUM SERPL-SCNC: 4.3 MEQ/L (ref 3.5–5.2)
SODIUM SERPL-SCNC: 137 MEQ/L (ref 135–145)
TRIGL SERPL-MCNC: 132 MG/DL (ref 0–199)

## 2024-12-18 PROCEDURE — 6370000000 HC RX 637 (ALT 250 FOR IP)

## 2024-12-18 PROCEDURE — 2500000003 HC RX 250 WO HCPCS: Performed by: INTERNAL MEDICINE

## 2024-12-18 PROCEDURE — 6370000000 HC RX 637 (ALT 250 FOR IP): Performed by: INTERNAL MEDICINE

## 2024-12-18 PROCEDURE — 99238 HOSP IP/OBS DSCHRG MGMT 30/<: CPT | Performed by: PHYSICIAN ASSISTANT

## 2024-12-18 PROCEDURE — 36415 COLL VENOUS BLD VENIPUNCTURE: CPT

## 2024-12-18 PROCEDURE — 80061 LIPID PANEL: CPT

## 2024-12-18 PROCEDURE — 85520 HEPARIN ASSAY: CPT

## 2024-12-18 PROCEDURE — 80048 BASIC METABOLIC PNL TOTAL CA: CPT

## 2024-12-18 PROCEDURE — 6370000000 HC RX 637 (ALT 250 FOR IP): Performed by: STUDENT IN AN ORGANIZED HEALTH CARE EDUCATION/TRAINING PROGRAM

## 2024-12-18 PROCEDURE — 6370000000 HC RX 637 (ALT 250 FOR IP): Performed by: NURSE PRACTITIONER

## 2024-12-18 PROCEDURE — 99232 SBSQ HOSP IP/OBS MODERATE 35: CPT | Performed by: NURSE PRACTITIONER

## 2024-12-18 PROCEDURE — 82948 REAGENT STRIP/BLOOD GLUCOSE: CPT

## 2024-12-18 RX ORDER — NITROGLYCERIN 0.4 MG/1
0.4 TABLET SUBLINGUAL EVERY 5 MIN PRN
Qty: 25 TABLET | Refills: 1 | Status: SHIPPED | OUTPATIENT
Start: 2024-12-18

## 2024-12-18 RX ORDER — INSULIN LISPRO 100 [IU]/ML
4 INJECTION, SOLUTION INTRAVENOUS; SUBCUTANEOUS
Status: DISCONTINUED | OUTPATIENT
Start: 2024-12-18 | End: 2024-12-18 | Stop reason: HOSPADM

## 2024-12-18 RX ORDER — ATORVASTATIN CALCIUM 80 MG/1
80 TABLET, FILM COATED ORAL NIGHTLY
Qty: 30 TABLET | Refills: 3 | Status: SHIPPED | OUTPATIENT
Start: 2024-12-18

## 2024-12-18 RX ADMIN — ACYCLOVIR 800 MG: 800 TABLET ORAL at 09:27

## 2024-12-18 RX ADMIN — TICAGRELOR 90 MG: 90 TABLET ORAL at 09:26

## 2024-12-18 RX ADMIN — FUROSEMIDE 20 MG: 20 TABLET ORAL at 09:27

## 2024-12-18 RX ADMIN — SODIUM CHLORIDE, PRESERVATIVE FREE 10 ML: 5 INJECTION INTRAVENOUS at 09:28

## 2024-12-18 RX ADMIN — ASPIRIN 81 MG: 81 TABLET, CHEWABLE ORAL at 09:26

## 2024-12-18 RX ADMIN — LOSARTAN POTASSIUM 25 MG: 25 TABLET, FILM COATED ORAL at 10:39

## 2024-12-18 RX ADMIN — SERTRALINE 25 MG: 25 TABLET, FILM COATED ORAL at 09:27

## 2024-12-18 RX ADMIN — CARVEDILOL 12.5 MG: 6.25 TABLET, FILM COATED ORAL at 09:27

## 2024-12-18 RX ADMIN — TICAGRELOR 90 MG: 90 TABLET ORAL at 00:57

## 2024-12-18 RX ADMIN — LEVOTHYROXINE SODIUM 50 MCG: 0.05 TABLET ORAL at 06:54

## 2024-12-18 RX ADMIN — FAMOTIDINE 40 MG: 20 TABLET, FILM COATED ORAL at 09:26

## 2024-12-18 NOTE — PLAN OF CARE
Problem: Chronic Conditions and Co-morbidities  Goal: Patient's chronic conditions and co-morbidity symptoms are monitored and maintained or improved  Outcome: Progressing  Flowsheets (Taken 12/17/2024 1915)  Care Plan - Patient's Chronic Conditions and Co-Morbidity Symptoms are Monitored and Maintained or Improved: Monitor and assess patient's chronic conditions and comorbid symptoms for stability, deterioration, or improvement     Problem: Discharge Planning  Goal: Discharge to home or other facility with appropriate resources  Outcome: Progressing  Flowsheets (Taken 12/17/2024 1915)  Discharge to home or other facility with appropriate resources: Identify barriers to discharge with patient and caregiver     Problem: Pain  Goal: Verbalizes/displays adequate comfort level or baseline comfort level  Outcome: Progressing     Problem: Safety - Adult  Goal: Free from fall injury  Outcome: Progressing     Problem: Neurosensory - Adult  Goal: Achieves stable or improved neurological status  Outcome: Progressing  Flowsheets (Taken 12/17/2024 1915)  Achieves stable or improved neurological status: Assess for and report changes in neurological status     Problem: Cardiovascular - Adult  Goal: Maintains optimal cardiac output and hemodynamic stability  Outcome: Progressing  Flowsheets (Taken 12/17/2024 1915)  Maintains optimal cardiac output and hemodynamic stability: Monitor blood pressure and heart rate   Care plan reviewed with patient.  Patient verbalize understanding of the plan of care and contribute to goal setting.

## 2024-12-18 NOTE — DISCHARGE SUMMARY
MINOCIN;DYNACIN     mirtazapine 15 MG tablet  Commonly known as: REMERON     OLANZapine 5 MG tablet  Commonly known as: ZYPREXA     ondansetron 4 MG disintegrating tablet  Commonly known as: ZOFRAN-ODT     Trulicity 0.75 MG/0.5ML Sopn SC injection  Generic drug: dulaglutide     venlafaxine 75 MG extended release capsule  Commonly known as: EFFEXOR XR               Where to Get Your Medications        These medications were sent to [facility] [address] - P [phone] - F [phone]  [address] [address]      Phone: [phone]   atorvastatin 80 MG tablet  nitroGLYCERIN 0.4 MG SL tablet  ticagrelor 90 MG Tabs tablet        Time Spent on discharge is <30 minutes.    Thank you Bhavya Ojeda APRN - CNP for the opportunity to be involved in this patient's care.      Signed:    Electronically signed by Jose M Baires PA-C on 12/18/24 at 10:19 AM EST

## 2024-12-18 NOTE — DISCHARGE INSTRUCTIONS
Groin Care Instructions        Normal Observation: You may or may not experience these.    Soreness or tenderness that may last a few weeks.    Possible bruising that could last a few weeks and up to one month.   Formation of a small lump (dime to quarter size) that should last only a few weeks.    Care of your incision  You may shower 24 hours after the procedure. Wet the dressing thoroughly and gently remove the bandage from the hospital during showering. It is easier to remove this way.             Gently clean your site daily using soap and water while standing in the shower. Dry thoroughly.   Do not apply powders or lotions to the site for 2 weeks.   Keep the site clean and dry to prevent infection.    Do not sit in a bathtub or a pool of water for 7 days.    Inspect the site daily.    Activity   You may resume normal activity in 2 days, including driving, letting pain be your     guide.   Limit lifting over 5 pounds (half gallon of milk) to one week or until site heals.  Limit vigorous activity (contact sports) to two weeks time.  You will be able to return to work in 1-3 days.    Call our office immediately if you experience any of the following…  Significant bleeding does not stop after 10 minutes of applying firm pressure directly over incision.   Increased swelling of groin or leg.   Unusual pain at groin or down that leg.   Signs of infection: redness, warmth to touch, drainage, poorly healing incision, fever, or chills.        Follow with Dr. Rosenberg 2-3 weeks sp PCI - new pt

## 2024-12-18 NOTE — PROGRESS NOTES
Echo completed. Dr. Rosenberg to read.   
Heart attack teaching covered with patient and/or family or significant other:  Signs and symptoms of a heart attack. yes  When to call 911 and the importance of calling 911. yes  Personal risk factors and ways to lower their risk. yes  4.   Importance of quitting smoking if applicable.  yes    Heart attack booklet given to the patient and/or family or significant other. Reviewed:  How to take Nitroglycerin. yes  The importance of participating in Cardiac Rehab and hours of operation.  yes  Heart Healthy Diet. yes  Risk factor modification.(Overweight, Obesity, Diabetes, Hypertension, Smoking, High Cholesterol, Stress) yes  Discharge instructions for Cath/Intervention procedure site if applicable.  Yes    Patient has stent card.           
Inpatient Cardiac Rehabilitation Consult    Received consult for Phase II Cardiac Rehabilitation.  Patient needs cardiac rehab due to PCI on 12/17/24.  Importance of Cardiac Rehab discussed with patient.  Reviewed cardiac rehab class times.  Patient questions answered.  We will contact patient at home to schedule evaluation appointment.  Pt will call us when she is ready to schedule since she does not have a phone.  Cardiac Rehab brochure given.            
Spiritual Health History and Assessment/Progress Note  [facility]    (P) Crisis, (P) Emotional distress,  ,      Name: Ashlee Grijalva MRN: [id number]    Age: 54 y.o.     Sex: female   Language: English   Moravian: None   ACS (acute coronary syndrome) (HCC)     Date: 12/18/2024            Total Time Calculated: (P) 30 min              Spiritual Assessment began in [facility]        Referral/Consult From: (P) Multi-disciplinary team   Encounter Overview/Reason: (P) Crisis  Service Provided For: (P) Patient    Earlene, Belief, Meaning:   Patient Other: No expressed belief or spirituality  Family/Friends No family/friends present      Importance and Influence:  Patient has no beliefs influential to healthcare decision-making identified during this visit  Family/Friends No family/friends present    Community:  Patient expresses feelings of isolation: disconnected from family/friends and feeling there is no one to turn to for help  Family/Friends No family/friends present    Assessment and Plan of Care:     Patient Interventions include: Facilitated expression of thoughts and feelings, Explored spiritual coping/struggle/distress, Affirmed coping skills/support systems, and Other: Prayed for patient bedside to end the visit.  Family/Friends Interventions include: No family/friends present    Patient Plan of Care: Spiritual Care available upon further referral  Family/Friends Plan of Care: No family/friends present    Electronically signed by Chaplain Carole on 12/18/2024 at 9:29 AM   
Spiritual Health History and Assessment/Progress Note  [facility]    (P) Follow-up, (P) Emotional distress (Spiritual consult for emotional distress support),  ,      Name: Ashlee Grijalva MRN: [id number]    Age: 54 y.o.     Sex: female   Language: English   Baptist: None   ACS (acute coronary syndrome) (HCC)     Date: 12/18/2024            Total Time Calculated: (P) 12 min              Spiritual Assessment began in [facility]        Referral/Consult From: (P) Nurse   Encounter Overview/Reason: (P) Follow-up  Service Provided For: (P) Patient    Earlene, Belief, Meaning:   Patient Other: Patient has no fatith or spiritual belief   Family/Friends No family/friends present      Importance and Influence:  Patient Other: None  Family/Friends No family/friends present    Community:  Patient Other: No community connection, loneliness, no family   Family/Friends No family/friends present    Assessment and Plan of Care:     Patient Interventions include: Facilitated expression of thoughts and feelings  Family/Friends Interventions include: No family/friends present    Patient Plan of Care: Spiritual Care available upon further referral  Family/Friends Plan of Care: No family/friends present    Electronically signed by HARMONY Phelps on 12/18/2024 at 2:53 PM   
  First Diagonal Branch   1st Diag lesion, 80% stenosed.      Second Diagonal Branch   2nd Diag lesion, 80% stenosed.      Ramus Intermedius   The vessel was visualized by angiography and is moderate in size. Size of vessel >=2.0 mm. There is moderate disease. The diseased area appears to be diffuse.      Left Circumflex   Ost Cx to Prox Cx lesion, 80% stenosed. The lesion was previously treated using a stent (unknown type).      Right Coronary Artery   Ost RCA to Mid RCA lesion, 70% stenosed.   Mid RCA lesion, 80% stenosed. The lesion was previously treated using a stent (unknown type).   Mid RCA to Dist RCA lesion, 70% stenosed.      Right Posterior Descending Artery   RPDA lesion, 50% stenosed.      Vein Graft To RPDA   Vein The graft was visualized by angiography. The graft exhibits minimal luminal irregularities.      Intervention     Mid LAD lesion   Angioplasty   Angioplasty was performed prior to stent deployment.   Supplies used: CATH BLLN ANGIO 2X12MM NC EUPHORIA RX   Stent   Drug-eluting stent was successfully placed.   Supplies used: STENT CORONARY JOSE C FRONTIER RX 2X18 MM ZOTAROLIMUS ELUT   Angioplasty   Angioplasty was performed following stent deployment.   Supplies used: CATH BLLN ANGIO 2X12MM NC EUPHORIA RX   Post-Intervention Lesion Assessment   The pre-interventional distal flow is normal (PRIYANKA 3). Post-intervention PRIYANKA flow is 3. There were no complications.   There is a 0% residual stenosis post intervention.        Coronary Diagram    Diagnostic  Dominance: Right    Intervention              Lab Data:    Cardiac Enzymes:  No results for input(s): \"CKTOTAL\", \"CKMB\", \"CKMBINDEX\", \"TROPONINI\" in the last 72 hours.    CBC:   Lab Results   Component Value Date/Time    WBC 11.1 12/16/2024 12:16 PM    RBC 4.72 12/16/2024 12:16 PM    HGB 13.0 12/16/2024 12:16 PM    HCT 38.1 12/16/2024 12:16 PM     12/16/2024 12:16 PM       CMP:    Lab Results   Component Value Date/Time     12/18/2024 
Problems    Diagnosis Date Noted    ACS (acute coronary syndrome) (HCC) [I24.9] 12/16/2024       Electronically signed by WAGNER Gonzalez CNP on 12/17/2024 at 6:43 AM

## 2025-01-03 ENCOUNTER — OFFICE VISIT (OUTPATIENT)
Dept: CARDIOLOGY CLINIC | Age: 55
End: 2025-01-03

## 2025-01-03 VITALS
SYSTOLIC BLOOD PRESSURE: 133 MMHG | DIASTOLIC BLOOD PRESSURE: 80 MMHG | WEIGHT: 261.8 LBS | HEART RATE: 78 BPM | BODY MASS INDEX: 43.62 KG/M2 | HEIGHT: 65 IN

## 2025-01-03 DIAGNOSIS — Z98.61 PERCUTANEOUS TRANSLUMINAL CORONARY ANGIOPLASTY STATUS: ICD-10-CM

## 2025-01-03 DIAGNOSIS — I10 ESSENTIAL HYPERTENSION: ICD-10-CM

## 2025-01-03 DIAGNOSIS — E78.5 DYSLIPIDEMIA: ICD-10-CM

## 2025-01-03 DIAGNOSIS — I50.32 CHRONIC DIASTOLIC CHF (CONGESTIVE HEART FAILURE) (HCC): ICD-10-CM

## 2025-01-03 DIAGNOSIS — M79.7 FIBROMYALGIA: ICD-10-CM

## 2025-01-03 DIAGNOSIS — I25.10 CORONARY ARTERY DISEASE INVOLVING NATIVE CORONARY ARTERY OF NATIVE HEART WITHOUT ANGINA PECTORIS: Primary | ICD-10-CM

## 2025-01-03 PROBLEM — I20.0 UNSTABLE ANGINA (HCC): Status: RESOLVED | Noted: 2019-12-12 | Resolved: 2025-01-03

## 2025-01-03 PROBLEM — I24.9 ACS (ACUTE CORONARY SYNDROME) (HCC): Status: RESOLVED | Noted: 2024-12-16 | Resolved: 2025-01-03

## 2025-01-03 PROBLEM — I21.4 NSTEMI (NON-ST ELEVATED MYOCARDIAL INFARCTION) (HCC): Status: RESOLVED | Noted: 2024-12-17 | Resolved: 2025-01-03

## 2025-01-03 NOTE — PROGRESS NOTES
Chief Complaint   Patient presents with    Follow-up     3 week follow up.       Date: 2024      Referring Provider: Gia Christensen APRN*     CHIEF COMPLAINT: cp     Reason of consultation- nstemi    Patient had a heart cath done on 2024  and  had 2 stents placed.    Last EKG done on 2024.    No more cp after stent    Denies chest pain, palpitations, dizziness, and edema.     Sob on exertion and fatigue better after stent  dec 2024    Walk with walker due to Rt foot amputation      Past Surgical History:   Procedure Laterality Date    CABG WITH AORTIC VALVE REPAIR      CARDIAC PROCEDURE N/A 2024    Left heart cath / coronary angiography performed by Primo Gil MD at [facility] CARDIAC CATH LAB    CARDIAC PROCEDURE N/A 2024    Percutaneous coronary intervention performed by Primo Gil MD at [facility] CARDIAC CATH LAB    CARDIAC SURGERY       SECTION      KIDNEY STONE SURGERY         Allergies   Allergen Reactions    Meperidine      Paralysis      Meperidine Hcl      Demerol TABS    Nitroglycerin      Patient reports \" bp dropping\" sublingual ntg per patient        Pregabalin Anxiety and Nausea And Vomiting    Promethazine Anxiety     Phenergan TABS    Other reaction(s): Hallucination  Other reaction(s): Hallucination  Phenergan TABS  Phenergan TABS          History reviewed. No pertinent family history.     Social History     Socioeconomic History    Marital status:      Spouse name: Not on file    Number of children: Not on file    Years of education: Not on file    Highest education level: Not on file   Occupational History    Not on file   Tobacco Use    Smoking status: Some Days    Smokeless tobacco: Never   Substance and Sexual Activity    Alcohol use: Not Currently    Drug use: Yes     Types: Methamphetamines (Crystal Meth)    Sexual activity: Not Currently   Other Topics Concern    Not on file   Social History Narrative    Not on file     Social

## 2025-03-03 ENCOUNTER — HOSPITAL ENCOUNTER (OUTPATIENT)
Age: 55
Discharge: HOME | End: 2025-03-03
Payer: COMMERCIAL

## 2025-03-03 ENCOUNTER — HOSPITAL ENCOUNTER (OUTPATIENT)
Dept: GENERAL RADIOLOGY | Age: 55
Discharge: HOME | End: 2025-03-03
Payer: COMMERCIAL

## 2025-03-03 DIAGNOSIS — R52 PAIN: ICD-10-CM

## 2025-03-03 PROCEDURE — 72100 X-RAY EXAM L-S SPINE 2/3 VWS: CPT

## 2025-05-03 ENCOUNTER — HOSPITAL ENCOUNTER (INPATIENT)
Age: 55
LOS: 2 days | Discharge: HOME | DRG: 198 | End: 2025-05-05
Attending: EMERGENCY MEDICINE | Admitting: STUDENT IN AN ORGANIZED HEALTH CARE EDUCATION/TRAINING PROGRAM
Payer: MEDICAID

## 2025-05-03 ENCOUNTER — APPOINTMENT (OUTPATIENT)
Dept: ULTRASOUND IMAGING | Age: 55
DRG: 198 | End: 2025-05-03
Payer: MEDICAID

## 2025-05-03 ENCOUNTER — APPOINTMENT (OUTPATIENT)
Dept: GENERAL RADIOLOGY | Age: 55
DRG: 198 | End: 2025-05-03
Payer: MEDICAID

## 2025-05-03 DIAGNOSIS — R07.9 CHEST PAIN, UNSPECIFIED TYPE: Primary | ICD-10-CM

## 2025-05-03 DIAGNOSIS — R94.31 ABNORMAL ELECTROCARDIOGRAPHY: ICD-10-CM

## 2025-05-03 DIAGNOSIS — N17.9 ACUTE KIDNEY INJURY: ICD-10-CM

## 2025-05-03 DIAGNOSIS — R06.02 SHORTNESS OF BREATH: ICD-10-CM

## 2025-05-03 DIAGNOSIS — R79.89 ELEVATED TROPONIN: ICD-10-CM

## 2025-05-03 PROBLEM — M25.519 SHOULDER PAIN: Status: ACTIVE | Noted: 2025-05-03

## 2025-05-03 LAB
ALBUMIN SERPL BCG-MCNC: 4.4 G/DL (ref 3.4–4.9)
ALP SERPL-CCNC: 80 U/L (ref 38–126)
ALT SERPL W/O P-5'-P-CCNC: 22 U/L (ref 10–35)
ANION GAP SERPL CALC-SCNC: 15 MEQ/L (ref 8–16)
APTT PPP: 27.1 SECONDS (ref 22–38)
AST SERPL-CCNC: 36 U/L (ref 10–35)
BASOPHILS ABSOLUTE: 0 THOU/MM3 (ref 0–0.1)
BASOPHILS NFR BLD AUTO: 0.5 %
BILIRUB CONJ SERPL-MCNC: 0.2 MG/DL (ref 0–0.2)
BILIRUB SERPL-MCNC: 0.4 MG/DL (ref 0.3–1.2)
BUN SERPL-MCNC: 34 MG/DL (ref 8–23)
CALCIUM SERPL-MCNC: 9.9 MG/DL (ref 8.6–10)
CHLORIDE SERPL-SCNC: 100 MEQ/L (ref 98–111)
CO2 SERPL-SCNC: 25 MEQ/L (ref 22–29)
CREAT SERPL-MCNC: 1.9 MG/DL (ref 0.5–0.9)
DEPRECATED RDW RBC AUTO: 37.4 FL (ref 35–45)
DEPRECATED RDW RBC AUTO: 38.2 FL (ref 35–45)
EOSINOPHIL NFR BLD AUTO: 4.5 %
EOSINOPHILS ABSOLUTE: 0.4 THOU/MM3 (ref 0–0.4)
ERYTHROCYTE [DISTWIDTH] IN BLOOD BY AUTOMATED COUNT: 12.5 % (ref 11.5–14.5)
ERYTHROCYTE [DISTWIDTH] IN BLOOD BY AUTOMATED COUNT: 12.6 % (ref 11.5–14.5)
GFR SERPL CREATININE-BSD FRML MDRD: 31 ML/MIN/1.73M2
GLUCOSE BLD STRIP.AUTO-MCNC: 131 MG/DL (ref 70–108)
GLUCOSE SERPL-MCNC: 135 MG/DL (ref 74–109)
HCT VFR BLD AUTO: 37.8 % (ref 37–47)
HCT VFR BLD AUTO: 38.7 % (ref 37–47)
HGB BLD-MCNC: 12.4 GM/DL (ref 12–16)
HGB BLD-MCNC: 13 GM/DL (ref 12–16)
IMM GRANULOCYTES # BLD AUTO: 0.02 THOU/MM3 (ref 0–0.07)
IMM GRANULOCYTES NFR BLD AUTO: 0.2 %
INR PPP: 0.97 (ref 0.85–1.13)
LIPASE SERPL-CCNC: 41 U/L (ref 13–60)
LYMPHOCYTES ABSOLUTE: 2.6 THOU/MM3 (ref 1–4.8)
LYMPHOCYTES NFR BLD AUTO: 31.9 %
MAGNESIUM SERPL-MCNC: 1.9 MG/DL (ref 1.6–2.6)
MCH RBC QN AUTO: 27.4 PG (ref 26–33)
MCH RBC QN AUTO: 27.7 PG (ref 26–33)
MCHC RBC AUTO-ENTMCNC: 32.8 GM/DL (ref 32.2–35.5)
MCHC RBC AUTO-ENTMCNC: 33.6 GM/DL (ref 32.2–35.5)
MCV RBC AUTO: 81.6 FL (ref 81–99)
MCV RBC AUTO: 84.4 FL (ref 81–99)
MONOCYTES ABSOLUTE: 0.6 THOU/MM3 (ref 0.4–1.3)
MONOCYTES NFR BLD AUTO: 6.7 %
NEUTROPHILS ABSOLUTE: 4.7 THOU/MM3 (ref 1.8–7.7)
NEUTROPHILS NFR BLD AUTO: 56.2 %
NRBC BLD AUTO-RTO: 0 /100 WBC
NT-PROBNP SERPL IA-MCNC: 256 PG/ML (ref 0–124)
OSMOLALITY SERPL CALC.SUM OF ELEC: 289 MOSMOL/KG (ref 275–300)
PLATELET # BLD AUTO: 248 THOU/MM3 (ref 130–400)
PLATELET # BLD AUTO: 274 THOU/MM3 (ref 130–400)
PMV BLD AUTO: 9.3 FL (ref 9.4–12.4)
PMV BLD AUTO: 9.7 FL (ref 9.4–12.4)
POTASSIUM SERPL-SCNC: 4.1 MEQ/L (ref 3.5–5.2)
PROT SERPL-MCNC: 7.5 G/DL (ref 6.4–8.3)
RBC # BLD AUTO: 4.48 MILL/MM3 (ref 4.2–5.4)
RBC # BLD AUTO: 4.74 MILL/MM3 (ref 4.2–5.4)
SODIUM SERPL-SCNC: 140 MEQ/L (ref 135–145)
TROPONIN, HIGH SENSITIVITY: 50 NG/L (ref 0–12)
TROPONIN, HIGH SENSITIVITY: 53 NG/L (ref 0–12)
TROPONIN, HIGH SENSITIVITY: 56 NG/L (ref 0–12)
WBC # BLD AUTO: 8.3 THOU/MM3 (ref 4.8–10.8)
WBC # BLD AUTO: 8.7 THOU/MM3 (ref 4.8–10.8)

## 2025-05-03 PROCEDURE — 85730 THROMBOPLASTIN TIME PARTIAL: CPT

## 2025-05-03 PROCEDURE — 76770 US EXAM ABDO BACK WALL COMP: CPT

## 2025-05-03 PROCEDURE — 85025 COMPLETE CBC W/AUTO DIFF WBC: CPT

## 2025-05-03 PROCEDURE — 2500000003 HC RX 250 WO HCPCS: Performed by: STUDENT IN AN ORGANIZED HEALTH CARE EDUCATION/TRAINING PROGRAM

## 2025-05-03 PROCEDURE — 6360000002 HC RX W HCPCS: Performed by: STUDENT IN AN ORGANIZED HEALTH CARE EDUCATION/TRAINING PROGRAM

## 2025-05-03 PROCEDURE — 6370000000 HC RX 637 (ALT 250 FOR IP): Performed by: EMERGENCY MEDICINE

## 2025-05-03 PROCEDURE — 93005 ELECTROCARDIOGRAM TRACING: CPT | Performed by: STUDENT IN AN ORGANIZED HEALTH CARE EDUCATION/TRAINING PROGRAM

## 2025-05-03 PROCEDURE — 80053 COMPREHEN METABOLIC PANEL: CPT

## 2025-05-03 PROCEDURE — 2580000003 HC RX 258: Performed by: STUDENT IN AN ORGANIZED HEALTH CARE EDUCATION/TRAINING PROGRAM

## 2025-05-03 PROCEDURE — 85520 HEPARIN ASSAY: CPT

## 2025-05-03 PROCEDURE — 73030 X-RAY EXAM OF SHOULDER: CPT

## 2025-05-03 PROCEDURE — 2060000000 HC ICU INTERMEDIATE R&B

## 2025-05-03 PROCEDURE — 99285 EMERGENCY DEPT VISIT HI MDM: CPT

## 2025-05-03 PROCEDURE — 84484 ASSAY OF TROPONIN QUANT: CPT

## 2025-05-03 PROCEDURE — 83690 ASSAY OF LIPASE: CPT

## 2025-05-03 PROCEDURE — 6370000000 HC RX 637 (ALT 250 FOR IP): Performed by: STUDENT IN AN ORGANIZED HEALTH CARE EDUCATION/TRAINING PROGRAM

## 2025-05-03 PROCEDURE — 85027 COMPLETE CBC AUTOMATED: CPT

## 2025-05-03 PROCEDURE — 36415 COLL VENOUS BLD VENIPUNCTURE: CPT

## 2025-05-03 PROCEDURE — 71045 X-RAY EXAM CHEST 1 VIEW: CPT

## 2025-05-03 PROCEDURE — 93005 ELECTROCARDIOGRAM TRACING: CPT | Performed by: EMERGENCY MEDICINE

## 2025-05-03 PROCEDURE — 83880 ASSAY OF NATRIURETIC PEPTIDE: CPT

## 2025-05-03 PROCEDURE — 85610 PROTHROMBIN TIME: CPT

## 2025-05-03 PROCEDURE — 82948 REAGENT STRIP/BLOOD GLUCOSE: CPT

## 2025-05-03 PROCEDURE — 82248 BILIRUBIN DIRECT: CPT

## 2025-05-03 PROCEDURE — 83735 ASSAY OF MAGNESIUM: CPT

## 2025-05-03 RX ORDER — INSULIN GLARGINE 100 [IU]/ML
32 INJECTION, SOLUTION SUBCUTANEOUS EVERY MORNING
COMMUNITY

## 2025-05-03 RX ORDER — POTASSIUM CHLORIDE 7.45 MG/ML
10 INJECTION INTRAVENOUS PRN
Status: DISCONTINUED | OUTPATIENT
Start: 2025-05-03 | End: 2025-05-05 | Stop reason: HOSPADM

## 2025-05-03 RX ORDER — NITROGLYCERIN 0.4 MG/1
0.4 TABLET SUBLINGUAL EVERY 5 MIN PRN
Status: DISCONTINUED | OUTPATIENT
Start: 2025-05-03 | End: 2025-05-05 | Stop reason: HOSPADM

## 2025-05-03 RX ORDER — DEXTROSE MONOHYDRATE 100 MG/ML
INJECTION, SOLUTION INTRAVENOUS CONTINUOUS PRN
Status: DISCONTINUED | OUTPATIENT
Start: 2025-05-03 | End: 2025-05-05 | Stop reason: HOSPADM

## 2025-05-03 RX ORDER — GLUCAGON 1 MG/ML
1 KIT INJECTION PRN
Status: DISCONTINUED | OUTPATIENT
Start: 2025-05-03 | End: 2025-05-05 | Stop reason: HOSPADM

## 2025-05-03 RX ORDER — ONDANSETRON 2 MG/ML
4 INJECTION INTRAMUSCULAR; INTRAVENOUS EVERY 6 HOURS PRN
Status: DISCONTINUED | OUTPATIENT
Start: 2025-05-03 | End: 2025-05-05 | Stop reason: HOSPADM

## 2025-05-03 RX ORDER — DULAGLUTIDE 0.75 MG/.5ML
0.75 INJECTION, SOLUTION SUBCUTANEOUS
Status: DISCONTINUED | OUTPATIENT
Start: 2025-05-03 | End: 2025-05-05 | Stop reason: HOSPADM

## 2025-05-03 RX ORDER — SODIUM CHLORIDE 0.9 % (FLUSH) 0.9 %
5-40 SYRINGE (ML) INJECTION EVERY 12 HOURS SCHEDULED
Status: DISCONTINUED | OUTPATIENT
Start: 2025-05-03 | End: 2025-05-05 | Stop reason: HOSPADM

## 2025-05-03 RX ORDER — GABAPENTIN 300 MG/1
300 CAPSULE ORAL DAILY
COMMUNITY

## 2025-05-03 RX ORDER — LOSARTAN POTASSIUM 25 MG/1
25 TABLET ORAL DAILY
Status: DISCONTINUED | OUTPATIENT
Start: 2025-05-04 | End: 2025-05-05 | Stop reason: HOSPADM

## 2025-05-03 RX ORDER — GABAPENTIN 300 MG/1
600 CAPSULE ORAL NIGHTLY
Status: ON HOLD | COMMUNITY
End: 2025-05-05 | Stop reason: HOSPADM

## 2025-05-03 RX ORDER — ACETAMINOPHEN 325 MG/1
650 TABLET ORAL EVERY 4 HOURS PRN
Status: DISCONTINUED | OUTPATIENT
Start: 2025-05-03 | End: 2025-05-05 | Stop reason: HOSPADM

## 2025-05-03 RX ORDER — SODIUM CHLORIDE 9 MG/ML
INJECTION, SOLUTION INTRAVENOUS PRN
Status: DISCONTINUED | OUTPATIENT
Start: 2025-05-03 | End: 2025-05-05 | Stop reason: HOSPADM

## 2025-05-03 RX ORDER — SODIUM CHLORIDE 9 MG/ML
INJECTION, SOLUTION INTRAVENOUS CONTINUOUS
Status: ACTIVE | OUTPATIENT
Start: 2025-05-03 | End: 2025-05-04

## 2025-05-03 RX ORDER — INSULIN LISPRO 100 [IU]/ML
0-8 INJECTION, SOLUTION INTRAVENOUS; SUBCUTANEOUS
Status: DISCONTINUED | OUTPATIENT
Start: 2025-05-03 | End: 2025-05-05 | Stop reason: HOSPADM

## 2025-05-03 RX ORDER — ACETAMINOPHEN 650 MG/1
650 SUPPOSITORY RECTAL EVERY 6 HOURS PRN
Status: DISCONTINUED | OUTPATIENT
Start: 2025-05-03 | End: 2025-05-05 | Stop reason: HOSPADM

## 2025-05-03 RX ORDER — ASPIRIN 81 MG/1
324 TABLET, CHEWABLE ORAL ONCE
Status: COMPLETED | OUTPATIENT
Start: 2025-05-03 | End: 2025-05-03

## 2025-05-03 RX ORDER — LEVOTHYROXINE SODIUM 50 UG/1
50 TABLET ORAL
Status: DISCONTINUED | OUTPATIENT
Start: 2025-05-04 | End: 2025-05-04

## 2025-05-03 RX ORDER — ATORVASTATIN CALCIUM 80 MG/1
80 TABLET, FILM COATED ORAL NIGHTLY
Status: DISCONTINUED | OUTPATIENT
Start: 2025-05-03 | End: 2025-05-05 | Stop reason: HOSPADM

## 2025-05-03 RX ORDER — ASPIRIN 81 MG/1
81 TABLET, CHEWABLE ORAL DAILY
Status: DISCONTINUED | OUTPATIENT
Start: 2025-05-04 | End: 2025-05-05 | Stop reason: HOSPADM

## 2025-05-03 RX ORDER — HEPARIN SODIUM 1000 [USP'U]/ML
4000 INJECTION, SOLUTION INTRAVENOUS; SUBCUTANEOUS ONCE
Status: COMPLETED | OUTPATIENT
Start: 2025-05-03 | End: 2025-05-03

## 2025-05-03 RX ORDER — HYDROXYZINE PAMOATE 25 MG/1
25 CAPSULE ORAL 4 TIMES DAILY PRN
Status: DISCONTINUED | OUTPATIENT
Start: 2025-05-03 | End: 2025-05-04

## 2025-05-03 RX ORDER — FAMOTIDINE 20 MG/1
40 TABLET, FILM COATED ORAL DAILY
Status: DISCONTINUED | OUTPATIENT
Start: 2025-05-04 | End: 2025-05-05 | Stop reason: HOSPADM

## 2025-05-03 RX ORDER — FUROSEMIDE 20 MG/1
20 TABLET ORAL DAILY
Status: DISCONTINUED | OUTPATIENT
Start: 2025-05-04 | End: 2025-05-05 | Stop reason: HOSPADM

## 2025-05-03 RX ORDER — CARVEDILOL 6.25 MG/1
12.5 TABLET ORAL 2 TIMES DAILY WITH MEALS
Status: DISCONTINUED | OUTPATIENT
Start: 2025-05-04 | End: 2025-05-04

## 2025-05-03 RX ORDER — MUPIROCIN 20 MG/G
OINTMENT TOPICAL 3 TIMES DAILY
Status: DISCONTINUED | OUTPATIENT
Start: 2025-05-03 | End: 2025-05-04

## 2025-05-03 RX ORDER — INSULIN GLARGINE 100 [IU]/ML
18 INJECTION, SOLUTION SUBCUTANEOUS NIGHTLY
COMMUNITY

## 2025-05-03 RX ORDER — GABAPENTIN 600 MG/1
600 TABLET ORAL 3 TIMES DAILY
Status: DISCONTINUED | OUTPATIENT
Start: 2025-05-03 | End: 2025-05-05 | Stop reason: HOSPADM

## 2025-05-03 RX ORDER — SERTRALINE HYDROCHLORIDE 25 MG/1
25 TABLET, FILM COATED ORAL DAILY
Status: DISCONTINUED | OUTPATIENT
Start: 2025-05-04 | End: 2025-05-04

## 2025-05-03 RX ORDER — INSULIN GLARGINE 100 [IU]/ML
10 INJECTION, SOLUTION SUBCUTANEOUS DAILY
Status: DISCONTINUED | OUTPATIENT
Start: 2025-05-04 | End: 2025-05-05 | Stop reason: HOSPADM

## 2025-05-03 RX ORDER — ARIPIPRAZOLE 10 MG/1
10 TABLET ORAL DAILY
COMMUNITY

## 2025-05-03 RX ORDER — HEPARIN SODIUM 1000 [USP'U]/ML
2000 INJECTION, SOLUTION INTRAVENOUS; SUBCUTANEOUS PRN
Status: DISCONTINUED | OUTPATIENT
Start: 2025-05-03 | End: 2025-05-05

## 2025-05-03 RX ORDER — ONDANSETRON 4 MG/1
4 TABLET, ORALLY DISINTEGRATING ORAL EVERY 8 HOURS PRN
Status: DISCONTINUED | OUTPATIENT
Start: 2025-05-03 | End: 2025-05-05 | Stop reason: HOSPADM

## 2025-05-03 RX ORDER — SPIRONOLACTONE 25 MG/1
25 TABLET ORAL DAILY
Status: DISCONTINUED | OUTPATIENT
Start: 2025-05-04 | End: 2025-05-05 | Stop reason: HOSPADM

## 2025-05-03 RX ORDER — SODIUM CHLORIDE 0.9 % (FLUSH) 0.9 %
5-40 SYRINGE (ML) INJECTION PRN
Status: DISCONTINUED | OUTPATIENT
Start: 2025-05-03 | End: 2025-05-05 | Stop reason: HOSPADM

## 2025-05-03 RX ORDER — INSULIN GLARGINE 100 [IU]/ML
18 INJECTION, SOLUTION SUBCUTANEOUS NIGHTLY
Status: DISCONTINUED | OUTPATIENT
Start: 2025-05-03 | End: 2025-05-05 | Stop reason: HOSPADM

## 2025-05-03 RX ORDER — POLYETHYLENE GLYCOL 3350 17 G/17G
17 POWDER, FOR SOLUTION ORAL DAILY PRN
Status: DISCONTINUED | OUTPATIENT
Start: 2025-05-03 | End: 2025-05-05 | Stop reason: HOSPADM

## 2025-05-03 RX ORDER — HEPARIN SODIUM 1000 [USP'U]/ML
4000 INJECTION, SOLUTION INTRAVENOUS; SUBCUTANEOUS PRN
Status: DISCONTINUED | OUTPATIENT
Start: 2025-05-03 | End: 2025-05-05

## 2025-05-03 RX ORDER — INSULIN LISPRO 100 [IU]/ML
15 INJECTION, SOLUTION INTRAVENOUS; SUBCUTANEOUS
Status: DISCONTINUED | OUTPATIENT
Start: 2025-05-03 | End: 2025-05-05 | Stop reason: HOSPADM

## 2025-05-03 RX ORDER — MAGNESIUM SULFATE IN WATER 40 MG/ML
2000 INJECTION, SOLUTION INTRAVENOUS PRN
Status: DISCONTINUED | OUTPATIENT
Start: 2025-05-03 | End: 2025-05-05 | Stop reason: HOSPADM

## 2025-05-03 RX ORDER — ACETAMINOPHEN 325 MG/1
650 TABLET ORAL EVERY 6 HOURS PRN
Status: DISCONTINUED | OUTPATIENT
Start: 2025-05-03 | End: 2025-05-05 | Stop reason: HOSPADM

## 2025-05-03 RX ORDER — HEPARIN SODIUM 10000 [USP'U]/100ML
5-30 INJECTION, SOLUTION INTRAVENOUS CONTINUOUS
Status: DISCONTINUED | OUTPATIENT
Start: 2025-05-03 | End: 2025-05-05

## 2025-05-03 RX ORDER — ENOXAPARIN SODIUM 100 MG/ML
30 INJECTION SUBCUTANEOUS 2 TIMES DAILY
Status: DISCONTINUED | OUTPATIENT
Start: 2025-05-03 | End: 2025-05-03

## 2025-05-03 RX ORDER — POTASSIUM CHLORIDE 1500 MG/1
40 TABLET, EXTENDED RELEASE ORAL PRN
Status: DISCONTINUED | OUTPATIENT
Start: 2025-05-03 | End: 2025-05-05 | Stop reason: HOSPADM

## 2025-05-03 RX ORDER — FENOFIBRATE 145 MG/1
145 TABLET, FILM COATED ORAL DAILY
COMMUNITY

## 2025-05-03 RX ADMIN — ACETAMINOPHEN 650 MG: 325 TABLET ORAL at 23:40

## 2025-05-03 RX ADMIN — SODIUM CHLORIDE, PRESERVATIVE FREE 10 ML: 5 INJECTION INTRAVENOUS at 22:55

## 2025-05-03 RX ADMIN — TICAGRELOR 90 MG: 90 TABLET ORAL at 22:56

## 2025-05-03 RX ADMIN — ASPIRIN 324 MG: 81 TABLET, CHEWABLE ORAL at 20:49

## 2025-05-03 RX ADMIN — SODIUM CHLORIDE: 0.9 INJECTION, SOLUTION INTRAVENOUS at 23:00

## 2025-05-03 RX ADMIN — INSULIN GLARGINE 18 UNITS: 100 INJECTION, SOLUTION SUBCUTANEOUS at 22:56

## 2025-05-03 RX ADMIN — HEPARIN SODIUM 4000 UNITS: 1000 INJECTION INTRAVENOUS; SUBCUTANEOUS at 23:41

## 2025-05-03 RX ADMIN — HEPARIN SODIUM 8 UNITS/KG/HR: 10000 INJECTION, SOLUTION INTRAVENOUS at 23:42

## 2025-05-03 RX ADMIN — GABAPENTIN 600 MG: 600 TABLET, FILM COATED ORAL at 23:33

## 2025-05-03 ASSESSMENT — PAIN DESCRIPTION - DESCRIPTORS
DESCRIPTORS: PRESSURE
DESCRIPTORS: DULL

## 2025-05-03 ASSESSMENT — PAIN DESCRIPTION - PAIN TYPE: TYPE: ACUTE PAIN

## 2025-05-03 ASSESSMENT — PAIN - FUNCTIONAL ASSESSMENT
PAIN_FUNCTIONAL_ASSESSMENT: ACTIVITIES ARE NOT PREVENTED
PAIN_FUNCTIONAL_ASSESSMENT: NONE - DENIES PAIN

## 2025-05-03 ASSESSMENT — HEART SCORE: ECG: NON-SPECIFC REPOLARIZATION DISTURBANCE/LBTB/PM

## 2025-05-03 ASSESSMENT — PAIN SCALES - GENERAL
PAINLEVEL_OUTOF10: 5
PAINLEVEL_OUTOF10: 5

## 2025-05-03 ASSESSMENT — PAIN DESCRIPTION - DIRECTION
RADIATING_TOWARDS: LEFT ARM
RADIATING_TOWARDS: LEFT ARM

## 2025-05-03 ASSESSMENT — PAIN DESCRIPTION - LOCATION
LOCATION: BACK
LOCATION: BACK

## 2025-05-03 ASSESSMENT — PAIN DESCRIPTION - ORIENTATION
ORIENTATION: LEFT
ORIENTATION: LEFT

## 2025-05-03 NOTE — ED PROVIDER NOTES
[facility]  eMERGENCY dEPARTMENT eNCOUnter          CHIEF COMPLAINT       Chief Complaint   Patient presents with    Back Pain    Shortness of Breath       Nurses Notes reviewed and I agree except as noted in the HPI.      HISTORY OF PRESENT ILLNESS    Ashlee Grijalva is a 54 y.o. female who presents for left scapular pain left arm pain.  Patient states that she is not having any overt chest pain.  She states she has had multiple heart attacks.  She states she has stents that she has had a bypass to RCA.  Patient states she sees cardiology here.  Patient denies any overt chest pain.  She has no shortness of breath.  She had bouts of nausea on and off no vomiting.  No diaphoresis, no syncopal or presyncopal episodes, no abdominal pain or changes in bowel or bladder habits.  Patient denies any type of neck complaints.  Patient states that she felt it was getting worse so she decided to come in.  Patient states she has not taken anything for this.  She states she is taking her medications as directed.  Patient is otherwise resting comfortably on the cot no apparent distress no other physical complaints at this time.  Patient is full code.    Cardiac catheterization  Patient Name  Ashlee Grijalva MRN  [id number] Legal Sex  Female     1970 (54 y.o.)     Physicians    Panel Physicians Referring Physician Case Authorizing Physician   Primo Gil MD (Primary)       Assisting Physician    None  Procedures    Left heart cath / coronary angiography   Percutaneous coronary intervention     Pre Procedure Diagnosis    NSTEMI (non-ST elevated myocardial infarction) (HCC) [I21.4]       Indications    NSTEMI (non-ST elevated myocardial infarction) (HCC) [I21.4 (ICD-10-CM)]     Link to Procedure Log    Procedure Log     PACS Images     Show images for Cardiac procedure  Cath/EP Waveforms    Cath/EP waveform scan report            Conclusion         Successful PCI of the mid LAD x 1 JT    Successful PCI

## 2025-05-03 NOTE — ED NOTES
Pt to the ED via EMS. Patient presents with complaints of shoulder blade pain that radiates down the left arm for a couple days. Patient denies injury to her back. Patient expresses SOB and an extensive cardiac history with stents and bypass due to six heart attacks. EKG done. Patient is alert and oriented x 4. Respirations are regular and unlabored. Patient provided blanket. Call light within reach.

## 2025-05-04 PROBLEM — N17.9 ACUTE KIDNEY INJURY: Status: ACTIVE | Noted: 2025-05-04

## 2025-05-04 LAB
AMPHETAMINES UR QL SCN: NEGATIVE
ANION GAP SERPL CALC-SCNC: 13 MEQ/L (ref 8–16)
BACTERIA: ABNORMAL
BARBITURATES UR QL SCN: NEGATIVE
BASOPHILS ABSOLUTE: 0 THOU/MM3 (ref 0–0.1)
BASOPHILS NFR BLD AUTO: 0.4 %
BENZODIAZ UR QL SCN: NEGATIVE
BILIRUB UR QL STRIP: NEGATIVE
BUN SERPL-MCNC: 35 MG/DL (ref 8–23)
BZE UR QL SCN: NEGATIVE
CALCIUM SERPL-MCNC: 8.8 MG/DL (ref 8.6–10)
CANNABINOIDS UR QL SCN: NEGATIVE
CASTS #/AREA URNS LPF: ABNORMAL /LPF
CASTS #/AREA URNS LPF: ABNORMAL /LPF
CHARACTER UR: ABNORMAL
CHARCOAL URNS QL MICRO: ABNORMAL
CHLORIDE 24H UR-SRATE: 22 MEQ/L
CHLORIDE SERPL-SCNC: 103 MEQ/L (ref 98–111)
CO2 SERPL-SCNC: 23 MEQ/L (ref 22–29)
COLOR UR: YELLOW
CREAT SERPL-MCNC: 1.6 MG/DL (ref 0.5–0.9)
CREAT UR-MCNC: 143 MG/DL
CRYSTALS URNS QL MICRO: ABNORMAL
DEPRECATED RDW RBC AUTO: 38.2 FL (ref 35–45)
EKG ATRIAL RATE: 68 BPM
EKG ATRIAL RATE: 73 BPM
EKG P AXIS: 21 DEGREES
EKG P-R INTERVAL: 162 MS
EKG P-R INTERVAL: 164 MS
EKG Q-T INTERVAL: 410 MS
EKG Q-T INTERVAL: 436 MS
EKG QRS DURATION: 124 MS
EKG QRS DURATION: 128 MS
EKG QTC CALCULATION (BAZETT): 451 MS
EKG QTC CALCULATION (BAZETT): 463 MS
EKG R AXIS: -142 DEGREES
EKG R AXIS: -58 DEGREES
EKG T AXIS: 87 DEGREES
EKG T AXIS: 89 DEGREES
EKG VENTRICULAR RATE: 68 BPM
EKG VENTRICULAR RATE: 73 BPM
EOSINOPHIL NFR BLD AUTO: 5.5 %
EOSINOPHILS ABSOLUTE: 0.4 THOU/MM3 (ref 0–0.4)
EPITHELIAL CELLS, UA: ABNORMAL /HPF
ERYTHROCYTE [DISTWIDTH] IN BLOOD BY AUTOMATED COUNT: 12.6 % (ref 11.5–14.5)
FENTANYL: NEGATIVE
FERRITIN SERPL IA-MCNC: 79 NG/ML (ref 13–150)
GFR SERPL CREATININE-BSD FRML MDRD: 38 ML/MIN/1.73M2
GLUCOSE BLD STRIP.AUTO-MCNC: 124 MG/DL (ref 70–108)
GLUCOSE BLD STRIP.AUTO-MCNC: 191 MG/DL (ref 70–108)
GLUCOSE BLD STRIP.AUTO-MCNC: 195 MG/DL (ref 70–108)
GLUCOSE BLD STRIP.AUTO-MCNC: 214 MG/DL (ref 70–108)
GLUCOSE SERPL-MCNC: 119 MG/DL (ref 74–109)
GLUCOSE UR QL STRIP.AUTO: NEGATIVE MG/DL
HCT VFR BLD AUTO: 35.4 % (ref 37–47)
HEPARIN UNFRACTIONATED: 0.36 U/ML (ref 0.3–0.7)
HEPARIN UNFRACTIONATED: 0.52 U/ML (ref 0.3–0.7)
HEPARIN UNFRACTIONATED: 0.65 U/ML (ref 0.3–0.7)
HEPARIN UNFRACTIONATED: < 0.04 U/ML (ref 0.3–0.7)
HGB BLD-MCNC: 11.9 GM/DL (ref 12–16)
HGB UR QL STRIP.AUTO: NEGATIVE
IMM GRANULOCYTES # BLD AUTO: 0.01 THOU/MM3 (ref 0–0.07)
IMM GRANULOCYTES NFR BLD AUTO: 0.1 %
IRON SATN MFR SERPL: 27 % (ref 20–50)
IRON SERPL-MCNC: 80 UG/DL (ref 37–145)
KETONES UR QL STRIP.AUTO: NEGATIVE
LEUKOCYTE ESTERASE UR QL STRIP.AUTO: ABNORMAL
LYMPHOCYTES ABSOLUTE: 3.1 THOU/MM3 (ref 1–4.8)
LYMPHOCYTES NFR BLD AUTO: 40.3 %
MAGNESIUM SERPL-MCNC: 1.8 MG/DL (ref 1.6–2.6)
MCH RBC QN AUTO: 28.1 PG (ref 26–33)
MCHC RBC AUTO-ENTMCNC: 33.6 GM/DL (ref 32.2–35.5)
MCV RBC AUTO: 83.7 FL (ref 81–99)
MONOCYTES ABSOLUTE: 0.6 THOU/MM3 (ref 0.4–1.3)
MONOCYTES NFR BLD AUTO: 8.3 %
NEUTROPHILS ABSOLUTE: 3.5 THOU/MM3 (ref 1.8–7.7)
NEUTROPHILS NFR BLD AUTO: 45.4 %
NITRITE UR QL STRIP.AUTO: NEGATIVE
NRBC BLD AUTO-RTO: 0 /100 WBC
OPIATES UR QL SCN: NEGATIVE
OSMOLALITY SERPL CALC.SUM OF ELEC: 286.7 MOSMOL/KG (ref 275–300)
OXYCODONE: NEGATIVE
PCP UR QL SCN: NEGATIVE
PH UR STRIP.AUTO: 5.5 [PH] (ref 5–9)
PHOSPHATE SERPL-MCNC: 3.6 MG/DL (ref 2.5–4.5)
PLATELET # BLD AUTO: 236 THOU/MM3 (ref 130–400)
PMV BLD AUTO: 9.8 FL (ref 9.4–12.4)
POTASSIUM SERPL-SCNC: 3.5 MEQ/L (ref 3.5–5.2)
PROT UR STRIP.AUTO-MCNC: NEGATIVE MG/DL
RBC # BLD AUTO: 4.23 MILL/MM3 (ref 4.2–5.4)
RBC #/AREA URNS HPF: ABNORMAL /HPF
RENAL EPI CELLS #/AREA URNS HPF: ABNORMAL /[HPF]
SODIUM SERPL-SCNC: 139 MEQ/L (ref 135–145)
SODIUM UR-SCNC: 44 MEQ/L
SPECIFIC GRAVITY UA: 1.01 (ref 1–1.03)
TIBC SERPL-MCNC: 301 UG/DL (ref 171–450)
UROBILINOGEN, URINE: 0.2 EU/DL (ref 0–1)
UUN 24H UR-MCNC: 621 MG/DL
WBC # BLD AUTO: 7.6 THOU/MM3 (ref 4.8–10.8)
WBC #/AREA URNS HPF: > 100 /HPF
YEAST LIKE FUNGI URNS QL MICRO: ABNORMAL

## 2025-05-04 PROCEDURE — 6370000000 HC RX 637 (ALT 250 FOR IP)

## 2025-05-04 PROCEDURE — 82948 REAGENT STRIP/BLOOD GLUCOSE: CPT

## 2025-05-04 PROCEDURE — 81001 URINALYSIS AUTO W/SCOPE: CPT

## 2025-05-04 PROCEDURE — 99223 1ST HOSP IP/OBS HIGH 75: CPT | Performed by: INTERNAL MEDICINE

## 2025-05-04 PROCEDURE — 2060000000 HC ICU INTERMEDIATE R&B

## 2025-05-04 PROCEDURE — 84300 ASSAY OF URINE SODIUM: CPT

## 2025-05-04 PROCEDURE — 83550 IRON BINDING TEST: CPT

## 2025-05-04 PROCEDURE — 84540 ASSAY OF URINE/UREA-N: CPT

## 2025-05-04 PROCEDURE — 99223 1ST HOSP IP/OBS HIGH 75: CPT | Performed by: STUDENT IN AN ORGANIZED HEALTH CARE EDUCATION/TRAINING PROGRAM

## 2025-05-04 PROCEDURE — 82436 ASSAY OF URINE CHLORIDE: CPT

## 2025-05-04 PROCEDURE — 93010 ELECTROCARDIOGRAM REPORT: CPT | Performed by: NUCLEAR MEDICINE

## 2025-05-04 PROCEDURE — 84100 ASSAY OF PHOSPHORUS: CPT

## 2025-05-04 PROCEDURE — 85025 COMPLETE CBC W/AUTO DIFF WBC: CPT

## 2025-05-04 PROCEDURE — 6370000000 HC RX 637 (ALT 250 FOR IP): Performed by: STUDENT IN AN ORGANIZED HEALTH CARE EDUCATION/TRAINING PROGRAM

## 2025-05-04 PROCEDURE — 80307 DRUG TEST PRSMV CHEM ANLYZR: CPT

## 2025-05-04 PROCEDURE — 80048 BASIC METABOLIC PNL TOTAL CA: CPT

## 2025-05-04 PROCEDURE — 2500000003 HC RX 250 WO HCPCS: Performed by: STUDENT IN AN ORGANIZED HEALTH CARE EDUCATION/TRAINING PROGRAM

## 2025-05-04 PROCEDURE — 83735 ASSAY OF MAGNESIUM: CPT

## 2025-05-04 PROCEDURE — 82728 ASSAY OF FERRITIN: CPT

## 2025-05-04 PROCEDURE — 85520 HEPARIN ASSAY: CPT

## 2025-05-04 PROCEDURE — 82570 ASSAY OF URINE CREATININE: CPT

## 2025-05-04 PROCEDURE — 83540 ASSAY OF IRON: CPT

## 2025-05-04 PROCEDURE — 36415 COLL VENOUS BLD VENIPUNCTURE: CPT

## 2025-05-04 RX ORDER — LEVOTHYROXINE SODIUM 75 UG/1
75 TABLET ORAL
Status: DISCONTINUED | OUTPATIENT
Start: 2025-05-04 | End: 2025-05-05 | Stop reason: HOSPADM

## 2025-05-04 RX ORDER — CARVEDILOL 3.12 MG/1
3.12 TABLET ORAL 2 TIMES DAILY WITH MEALS
Status: DISCONTINUED | OUTPATIENT
Start: 2025-05-04 | End: 2025-05-05 | Stop reason: HOSPADM

## 2025-05-04 RX ORDER — HYDROXYZINE PAMOATE 50 MG/1
50 CAPSULE ORAL 2 TIMES DAILY
Status: DISCONTINUED | OUTPATIENT
Start: 2025-05-04 | End: 2025-05-05 | Stop reason: HOSPADM

## 2025-05-04 RX ORDER — SERTRALINE HYDROCHLORIDE 100 MG/1
100 TABLET, FILM COATED ORAL DAILY
Status: DISCONTINUED | OUTPATIENT
Start: 2025-05-04 | End: 2025-05-05 | Stop reason: HOSPADM

## 2025-05-04 RX ORDER — METOPROLOL SUCCINATE 25 MG/1
25 TABLET, EXTENDED RELEASE ORAL DAILY
Status: DISCONTINUED | OUTPATIENT
Start: 2025-05-04 | End: 2025-05-04 | Stop reason: ALTCHOICE

## 2025-05-04 RX ADMIN — TICAGRELOR 90 MG: 90 TABLET ORAL at 09:53

## 2025-05-04 RX ADMIN — TICAGRELOR 90 MG: 90 TABLET ORAL at 20:45

## 2025-05-04 RX ADMIN — FAMOTIDINE 40 MG: 20 TABLET, FILM COATED ORAL at 09:54

## 2025-05-04 RX ADMIN — SODIUM CHLORIDE, PRESERVATIVE FREE 10 ML: 5 INJECTION INTRAVENOUS at 09:54

## 2025-05-04 RX ADMIN — ATORVASTATIN CALCIUM 80 MG: 80 TABLET, FILM COATED ORAL at 20:45

## 2025-05-04 RX ADMIN — CARVEDILOL 3.12 MG: 6.25 TABLET, FILM COATED ORAL at 18:27

## 2025-05-04 RX ADMIN — HYDROXYZINE PAMOATE 50 MG: 50 CAPSULE ORAL at 09:54

## 2025-05-04 RX ADMIN — INSULIN LISPRO 2 UNITS: 100 INJECTION, SOLUTION INTRAVENOUS; SUBCUTANEOUS at 20:46

## 2025-05-04 RX ADMIN — INSULIN GLARGINE 18 UNITS: 100 INJECTION, SOLUTION SUBCUTANEOUS at 20:45

## 2025-05-04 RX ADMIN — GABAPENTIN 600 MG: 600 TABLET, FILM COATED ORAL at 18:26

## 2025-05-04 RX ADMIN — CARVEDILOL 3.12 MG: 6.25 TABLET, FILM COATED ORAL at 09:55

## 2025-05-04 RX ADMIN — INSULIN GLARGINE 10 UNITS: 100 INJECTION, SOLUTION SUBCUTANEOUS at 09:54

## 2025-05-04 RX ADMIN — LEVOTHYROXINE SODIUM 75 MCG: 0.07 TABLET ORAL at 06:04

## 2025-05-04 RX ADMIN — GABAPENTIN 600 MG: 600 TABLET, FILM COATED ORAL at 09:53

## 2025-05-04 RX ADMIN — ONDANSETRON 4 MG: 4 TABLET, ORALLY DISINTEGRATING ORAL at 09:57

## 2025-05-04 RX ADMIN — ASPIRIN 81 MG: 81 TABLET, CHEWABLE ORAL at 09:54

## 2025-05-04 RX ADMIN — HYDROXYZINE PAMOATE 50 MG: 50 CAPSULE ORAL at 20:45

## 2025-05-04 RX ADMIN — SERTRALINE 100 MG: 100 TABLET, FILM COATED ORAL at 09:53

## 2025-05-04 RX ADMIN — GABAPENTIN 600 MG: 600 TABLET, FILM COATED ORAL at 23:24

## 2025-05-04 ASSESSMENT — PAIN SCALES - GENERAL: PAINLEVEL_OUTOF10: 2

## 2025-05-04 NOTE — PLAN OF CARE
Problem: Chronic Conditions and Co-morbidities  Goal: Patient's chronic conditions and co-morbidity symptoms are monitored and maintained or improved  Outcome: Progressing  Flowsheets (Taken 5/4/2025 0346)  Care Plan - Patient's Chronic Conditions and Co-Morbidity Symptoms are Monitored and Maintained or Improved:   Monitor and assess patient's chronic conditions and comorbid symptoms for stability, deterioration, or improvement   Collaborate with multidisciplinary team to address chronic and comorbid conditions and prevent exacerbation or deterioration   Update acute care plan with appropriate goals if chronic or comorbid symptoms are exacerbated and prevent overall improvement and discharge     Problem: Discharge Planning  Goal: Discharge to home or other facility with appropriate resources  Outcome: Progressing  Flowsheets (Taken 5/4/2025 0346)  Discharge to home or other facility with appropriate resources:   Identify barriers to discharge with patient and caregiver   Arrange for needed discharge resources and transportation as appropriate   Identify discharge learning needs (meds, wound care, etc)     Problem: Pain  Goal: Verbalizes/displays adequate comfort level or baseline comfort level  Outcome: Progressing  Flowsheets (Taken 5/4/2025 0346)  Verbalizes/displays adequate comfort level or baseline comfort level:   Encourage patient to monitor pain and request assistance   Assess pain using appropriate pain scale   Administer analgesics based on type and severity of pain and evaluate response   Implement non-pharmacological measures as appropriate and evaluate response     Problem: Safety - Adult  Goal: Free from fall injury  Outcome: Progressing  Flowsheets (Taken 5/4/2025 0346)  Free From Fall Injury: Instruct family/caregiver on patient safety     Problem: Cardiovascular - Adult  Goal: Maintains optimal cardiac output and hemodynamic stability  Outcome: Progressing  Flowsheets (Taken 5/4/2025

## 2025-05-04 NOTE — PLAN OF CARE
Problem: Chronic Conditions and Co-morbidities  Goal: Patient's chronic conditions and co-morbidity symptoms are monitored and maintained or improved  5/4/2025 1009 by Rosalina Fournier RN  Outcome: Progressing     Problem: Discharge Planning  Goal: Discharge to home or other facility with appropriate resources  5/4/2025 1009 by Rosalina Fournier RN  Outcome: Progressing     Problem: Pain  Goal: Verbalizes/displays adequate comfort level or baseline comfort level  5/4/2025 0346 by Gabby Villarreal RN  Outcome: Progressing  Flowsheets (Taken 5/4/2025 0346)  Verbalizes/displays adequate comfort level or baseline comfort level:   Encourage patient to monitor pain and request assistance   Assess pain using appropriate pain scale   Administer analgesics based on type and severity of pain and evaluate response   Implement non-pharmacological measures as appropriate and evaluate response     Problem: Pain  Goal: Verbalizes/displays adequate comfort level or baseline comfort level  5/4/2025 1009 by Rosalina Fournier RN  Outcome: Progressing     Problem: Safety - Adult  Goal: Free from fall injury  5/4/2025 1009 by Rosalina Fournier RN  Outcome: Progressing     Problem: Cardiovascular - Adult  Goal: Maintains optimal cardiac output and hemodynamic stability  5/4/2025 1009 by Rosalina Fournier RN  Outcome: Progressing     Problem: Cardiovascular - Adult  Goal: Absence of cardiac dysrhythmias or at baseline  5/4/2025 1009 by Rosalina Fournier RN  Outcome: Progressing  Care Plan reviewed with patient.  Patient verbalizes understanding to educator and is willing to contribute and participate in goal planning.

## 2025-05-04 NOTE — CONSULTS
[place] [id number]                              CONSULTATION      PATIENT NAME: DUSTIN BOURNE              : 1970  MED REC NO: [id number]                       ROOM: [facility]  ACCOUNT NO: [id number]                       ADMIT DATE: 2025  PROVIDER: Krish Rosenberg MD      CONSULT DATE: 2025    REASON FOR CONSULTATION:  Presentation with left shoulder blade and left shoulder pain for 3 days, which was constant.    HISTORY OF PRESENT ILLNESS:  This is a 54-year-old  female patient with past medical history of coronary artery disease, status post previous bypass in  in [place] and later coronary artery stent in  at [facility] and later coronary artery stent in 2024 at [facility] and was in usual state of health until 3 days prior to admission.  Three days prior to admission, patient complained started to have left shoulder blade pain with left shoulder pain and radiating to left neck and basically tingling sensation on the left upper arm, and this has been going on for 3 days and nauseous feeling and so did not get better for 3 days and so finally decided on come to the emergency room.  The patient denied having any form of chest pain when she has this shoulder blade pain and gets worse with moving her shoulders and so came to the emergency room.  She was given pain medication and got better and resolved.  Now, she has shoulder pain free.  This was around 5 to 6 out of 10 and no associated symptoms.  Denied any dizziness and constipation, nausea, vomiting, or diarrhea, and the patient has been taking Lasix 20 mg daily and has been increased to 40 mg about 1-1/2 months ago by her primary care doctor and as her creatinine was found to be markedly elevated when she came in.  She has been on 20, and she has some leg swelling and because of that the Lasix apparently seems to be increased and caused her to have

## 2025-05-04 NOTE — H&P
History & Physical  Internal Medicine Resident         Patient: Ashlee LUI Rudi 54 y.o. female      : 1970  Date of Admission: 5/3/2025  Date of Service: Pt seen/examined on 25 and Admitted to Inpatient with expected LOS greater than two midnights due to medical therapy.       ASSESSMENT AND PLAN  Atypical Chest pain r/o ACS: Patient presented with left shoulder pain radiating to left neck associated with right upper arm tingling sensation, nausea and shortness of breath for about 3 to 4 days.  Troponin in the ED 56, EKG without any ST segment changes.  Patient has extensive cardiac history with recent cardiac cath in 2024 with PCI of the mid LAD x 1 JT & Successful PCI of the ostial LCX ISR x 1 JT with patent SVG to PDA.Prior Hx of JT in the proximal circumflex. 2023 / CABG x1 . Patient discharged with DAPT with ASA 81 and Brilinta 90 BID  Considering extensive cardiac history, Patient was admitted for further evaluation  Troponin Trend: 56 > 50  EKG without acute ST/T wave changes  Trend troponin x 3, Repeat EKG and compare  Patient's HEART SCORE: 6 with 12-16% risk of MACE over next 6 weeks: Patient is admitted for evaluation.  Last ECHO on 2024 with ejection fraction of 60 to 65% with grade 1 diastolic dysfunction with normal LAP.  Will repeat ECHO  Continue DAPT, Started on heparin gtt due to risk factors and Heart score 6  Cardiology consulted, Appreciate their recommendations    Acute kidney Injury: Creatinine on admission 1.9, baseline 0.9, patient Lasix was increased from 20 to 40 mg once a day more than 1.5 months ago.  Patient states she is trying to keep herself hydrated.  BUN/Cr : 17:1  Patient with dry mucus membranes, will start on NS @ 100 ml/hr x 20 hours considering history of diastolic dysfunction. Re-evaluate   Urine studies, UA and Renal US pending  Elevated troponin: Patient presented with troponin of 56, Baseline 41 in . EKG without any acute ST/T

## 2025-05-04 NOTE — ED NOTES
Patient transported to  [facility] by cart in stable condition.   IV line is patent.

## 2025-05-04 NOTE — CONSULTS
Left should and shoulder blade pain  nO chest pain  PREM due to lasix increased to 40 mg for the last 45 days from her usual dose 20  CAD s/p CABG and stent dec 2024  HFpEF  HTN  HLP  Hx of FM  Sp cardiac cath 12/17/2024    Successful PCI of the mid LAD x 1 JT    Successful PCI of the ostial LCX ISR x 1 JT    Patent SVG to PDA  Prior CAD (JT in the proximal circumflex. 11/2023) at [facility]  S/p / CABG x1 2012  in [place]    Plan  Cont hydration   Néstor nuc at am  OMT  On d/c make sure to decrease lasix back to 20 once renal function normalized  Cont coreg  [id number]  Krish Rosenberg MD

## 2025-05-04 NOTE — ED NOTES
ED to inpatient nurses report      Chief Complaint:  Chief Complaint   Patient presents with    Back Pain    Shortness of Breath     Present to ED from: Home    MOA:     LOC: alert and orientated to name, place, date  Mobility: Requires assistance * 1  Oxygen Baseline: RA    Current needs required: RA     Code Status:   Full Code    What abnormal results were found and what did you give/do to treat them? See labs  Any procedures or intervention occur? See MAR    Mental Status:  Level of Consciousness: Alert (0)    Psych Assessment:        Vitals:  Patient Vitals for the past 24 hrs:   BP Temp Temp src Pulse Resp SpO2 Weight   05/03/25 1953 (!) 122/59 -- -- 74 17 97 % --   05/03/25 1857 -- -- -- -- -- -- 117 kg (258 lb)   05/03/25 1854 (!) 155/84 98.2 °F (36.8 °C) Oral 73 18 100 % --        LDAs:   Peripheral IV 05/03/25 Left Forearm (Active)   Site Assessment Clean, dry & intact 05/03/25 1900   Line Status Blood return noted;Normal saline locked 05/03/25 1900   Phlebitis Assessment No symptoms 05/03/25 1900   Infiltration Assessment 0 05/03/25 1900   Dressing Status Clean, dry & intact 05/03/25 1900   Dressing Type Securing device;Transparent 05/03/25 1900       Ambulatory Status:  Presents to emergency department  because of falls (Syncope, seizure, or loss of consciousness): No, Age > 70: No, Altered Mental Status, Intoxication with alcohol or substance confusion (Disorientation, impaired judgment, poor safety awaremess, or inability to follow instructions): No, Impaired Mobility: Ambulates or transfers with assistive devices or assistance; Unable to ambulate or transer.: No, Nursing Judgement: No    Diagnosis:  DISPOSITION Admitted 05/03/2025 08:51:32 PM   Final diagnoses:   Chest pain, unspecified type   Acute kidney injury   Elevated troponin        Consults:  STR ED TO IP CONSULT     Pain Score:  Pain Assessment  Pain Assessment: 0-10  Pain Level: 5  Pain Location: Back  Pain Orientation: Left  Pain

## 2025-05-04 NOTE — PROCEDURES
PROCEDURE NOTE  Date: 5/3/2025   Name: Ashlee LUI Rudi  YOB: 1970    Procedures  EKG Completed. Handed to RN.          Patient brought to  area from triage. Patient is calm and cooperative just speaks in a very loud tone and was agitated with staff at the facility. After speaking with facility they stated this behavior is normal and that pt called EMS herself. Pt waiting for further orders and disposition.  ALEXANDRA Parmar

## 2025-05-05 ENCOUNTER — APPOINTMENT (OUTPATIENT)
Age: 55
DRG: 198 | End: 2025-05-05
Attending: INTERNAL MEDICINE
Payer: MEDICAID

## 2025-05-05 ENCOUNTER — APPOINTMENT (OUTPATIENT)
Dept: NUCLEAR MEDICINE | Age: 55
DRG: 198 | End: 2025-05-05
Attending: INTERNAL MEDICINE
Payer: MEDICAID

## 2025-05-05 ENCOUNTER — APPOINTMENT (OUTPATIENT)
Age: 55
DRG: 198 | End: 2025-05-05
Attending: STUDENT IN AN ORGANIZED HEALTH CARE EDUCATION/TRAINING PROGRAM
Payer: MEDICAID

## 2025-05-05 VITALS
WEIGHT: 258.2 LBS | HEIGHT: 65 IN | HEART RATE: 70 BPM | SYSTOLIC BLOOD PRESSURE: 114 MMHG | RESPIRATION RATE: 18 BRPM | BODY MASS INDEX: 43.02 KG/M2 | OXYGEN SATURATION: 100 % | TEMPERATURE: 97.5 F | DIASTOLIC BLOOD PRESSURE: 43 MMHG

## 2025-05-05 LAB
ANION GAP SERPL CALC-SCNC: 11 MEQ/L (ref 8–16)
BASOPHILS ABSOLUTE: 0 THOU/MM3 (ref 0–0.1)
BASOPHILS NFR BLD AUTO: 0.7 %
BUN SERPL-MCNC: 28 MG/DL (ref 8–23)
CALCIUM SERPL-MCNC: 8.9 MG/DL (ref 8.6–10)
CHLORIDE SERPL-SCNC: 106 MEQ/L (ref 98–111)
CO2 SERPL-SCNC: 21 MEQ/L (ref 22–29)
CREAT SERPL-MCNC: 1.3 MG/DL (ref 0.5–0.9)
DEPRECATED RDW RBC AUTO: 38.7 FL (ref 35–45)
ECHO AO ASC DIAM: 3.1 CM
ECHO AO ASCENDING AORTA INDEX: 1.41 CM/M2
ECHO AV CUSP MM: 1.7 CM
ECHO AV PEAK GRADIENT: 9 MMHG
ECHO AV PEAK VELOCITY: 1.5 M/S
ECHO AV VELOCITY RATIO: 0.6
ECHO BSA: 2.32 M2
ECHO BSA: 2.32 M2
ECHO EST RA PRESSURE: 3 MMHG
ECHO LA AREA 2C: 21.2 CM2
ECHO LA AREA 4C: 20.2 CM2
ECHO LA DIAMETER INDEX: 1.32 CM/M2
ECHO LA DIAMETER: 2.9 CM
ECHO LA MAJOR AXIS: 5.6 CM
ECHO LA MINOR AXIS: 5.4 CM
ECHO LA VOL BP: 65 ML (ref 22–52)
ECHO LA VOL MOD A2C: 69 ML (ref 22–52)
ECHO LA VOL MOD A4C: 60 ML (ref 22–52)
ECHO LA VOL/BSA BIPLANE: 30 ML/M2 (ref 16–34)
ECHO LA VOLUME INDEX MOD A2C: 31 ML/M2 (ref 16–34)
ECHO LA VOLUME INDEX MOD A4C: 27 ML/M2 (ref 16–34)
ECHO LV E' LATERAL VELOCITY: 9.6 CM/S
ECHO LV E' SEPTAL VELOCITY: 5 CM/S
ECHO LV EF PHYSICIAN: 55 %
ECHO LV FRACTIONAL SHORTENING: 30 % (ref 28–44)
ECHO LV INTERNAL DIMENSION DIASTOLE INDEX: 1.68 CM/M2
ECHO LV INTERNAL DIMENSION DIASTOLIC: 3.7 CM (ref 3.9–5.3)
ECHO LV INTERNAL DIMENSION SYSTOLIC INDEX: 1.18 CM/M2
ECHO LV INTERNAL DIMENSION SYSTOLIC: 2.6 CM
ECHO LV ISOVOLUMETRIC RELAXATION TIME (IVRT): 99 MS
ECHO LV IVSD: 1.2 CM (ref 0.6–0.9)
ECHO LV MASS 2D: 147.3 G (ref 67–162)
ECHO LV MASS INDEX 2D: 67 G/M2 (ref 43–95)
ECHO LV POSTERIOR WALL DIASTOLIC: 1.2 CM (ref 0.6–0.9)
ECHO LV RELATIVE WALL THICKNESS RATIO: 0.65
ECHO LVOT PEAK GRADIENT: 3 MMHG
ECHO LVOT PEAK VELOCITY: 0.9 M/S
ECHO MV A VELOCITY: 0.95 M/S
ECHO MV E DECELERATION TIME (DT): 370 MS
ECHO MV E VELOCITY: 0.84 M/S
ECHO MV E/A RATIO: 0.88
ECHO MV E/E' LATERAL: 8.75
ECHO MV E/E' RATIO (AVERAGED): 12.78
ECHO MV E/E' SEPTAL: 16.8
ECHO PV MAX VELOCITY: 0.8 M/S
ECHO PV PEAK GRADIENT: 2 MMHG
ECHO RIGHT VENTRICULAR SYSTOLIC PRESSURE (RVSP): 21 MMHG
ECHO RV INTERNAL DIMENSION: 2.3 CM
ECHO RV TAPSE: 1.7 CM (ref 1.7–?)
ECHO TV E WAVE: 0.9 M/S
ECHO TV REGURGITANT MAX VELOCITY: 2.12 M/S
ECHO TV REGURGITANT PEAK GRADIENT: 18 MMHG
EOSINOPHIL NFR BLD AUTO: 7 %
EOSINOPHILS ABSOLUTE: 0.4 THOU/MM3 (ref 0–0.4)
ERYTHROCYTE [DISTWIDTH] IN BLOOD BY AUTOMATED COUNT: 12.5 % (ref 11.5–14.5)
GFR SERPL CREATININE-BSD FRML MDRD: 49 ML/MIN/1.73M2
GLUCOSE BLD STRIP.AUTO-MCNC: 227 MG/DL (ref 70–108)
GLUCOSE SERPL-MCNC: 175 MG/DL (ref 74–109)
HCT VFR BLD AUTO: 33.8 % (ref 37–47)
HEPARIN UNFRACTIONATED: 0.38 U/ML (ref 0.3–0.7)
HGB BLD-MCNC: 11 GM/DL (ref 12–16)
IMM GRANULOCYTES # BLD AUTO: 0.01 THOU/MM3 (ref 0–0.07)
IMM GRANULOCYTES NFR BLD AUTO: 0.2 %
LYMPHOCYTES ABSOLUTE: 2 THOU/MM3 (ref 1–4.8)
LYMPHOCYTES NFR BLD AUTO: 35.6 %
MAGNESIUM SERPL-MCNC: 2 MG/DL (ref 1.6–2.6)
MCH RBC QN AUTO: 27.8 PG (ref 26–33)
MCHC RBC AUTO-ENTMCNC: 32.5 GM/DL (ref 32.2–35.5)
MCV RBC AUTO: 85.4 FL (ref 81–99)
MONOCYTES ABSOLUTE: 0.5 THOU/MM3 (ref 0.4–1.3)
MONOCYTES NFR BLD AUTO: 8.1 %
NEUTROPHILS ABSOLUTE: 2.8 THOU/MM3 (ref 1.8–7.7)
NEUTROPHILS NFR BLD AUTO: 48.4 %
NRBC BLD AUTO-RTO: 0 /100 WBC
NUC STRESS EJECTION FRACTION: 78 %
PHOSPHATE SERPL-MCNC: 2.9 MG/DL (ref 2.5–4.5)
PLATELET # BLD AUTO: 180 THOU/MM3 (ref 130–400)
PMV BLD AUTO: 9.7 FL (ref 9.4–12.4)
POTASSIUM SERPL-SCNC: 4.2 MEQ/L (ref 3.5–5.2)
RBC # BLD AUTO: 3.96 MILL/MM3 (ref 4.2–5.4)
SODIUM SERPL-SCNC: 138 MEQ/L (ref 135–145)
STRESS BASELINE DIAS BP: 57 MMHG
STRESS BASELINE HR: 67 BPM
STRESS BASELINE SYS BP: 107 MMHG
STRESS ESTIMATED WORKLOAD: 1 METS
STRESS PEAK DIAS BP: 44 MMHG
STRESS PEAK SYS BP: 112 MMHG
STRESS PERCENT HR ACHIEVED: 53 %
STRESS POST PEAK HR: 88 BPM
STRESS RATE PRESSURE PRODUCT: 9856 BPM*MMHG
STRESS ST DEPRESSION: 0 MM
STRESS STAGE 1 BP: NORMAL MMHG
STRESS STAGE 1 DURATION: 1 MIN:SEC
STRESS STAGE 1 HR: 74 BPM
STRESS STAGE 2 BP: NORMAL MMHG
STRESS STAGE 2 DURATION: 1 MIN:SEC
STRESS STAGE 2 HR: 88 BPM
STRESS STAGE 3 BP: NORMAL MMHG
STRESS STAGE 3 DURATION: 1 MIN:SEC
STRESS STAGE 3 HR: 86 BPM
STRESS STAGE RECOVERY 1 BP: NORMAL MMHG
STRESS STAGE RECOVERY 1 DURATION: 1 MIN:SEC
STRESS STAGE RECOVERY 1 HR: 82 BPM
STRESS STAGE RECOVERY 2 BP: NORMAL MMHG
STRESS STAGE RECOVERY 2 DURATION: 1 MIN:SEC
STRESS STAGE RECOVERY 2 HR: 82 BPM
STRESS STAGE RECOVERY 3 BP: NORMAL MMHG
STRESS STAGE RECOVERY 3 DURATION: 1 MIN:SEC
STRESS STAGE RECOVERY 3 HR: 81 BPM
STRESS STAGE RECOVERY 4 BP: NORMAL MMHG
STRESS STAGE RECOVERY 4 DURATION: 1 MIN:SEC
STRESS STAGE RECOVERY 4 HR: 80 BPM
STRESS TARGET HR: 166 BPM
TID: 1.17
WBC # BLD AUTO: 5.7 THOU/MM3 (ref 4.8–10.8)

## 2025-05-05 PROCEDURE — 78452 HT MUSCLE IMAGE SPECT MULT: CPT | Performed by: INTERNAL MEDICINE

## 2025-05-05 PROCEDURE — 6360000002 HC RX W HCPCS: Performed by: INTERNAL MEDICINE

## 2025-05-05 PROCEDURE — 2580000003 HC RX 258: Performed by: STUDENT IN AN ORGANIZED HEALTH CARE EDUCATION/TRAINING PROGRAM

## 2025-05-05 PROCEDURE — 93306 TTE W/DOPPLER COMPLETE: CPT

## 2025-05-05 PROCEDURE — 36415 COLL VENOUS BLD VENIPUNCTURE: CPT

## 2025-05-05 PROCEDURE — 6370000000 HC RX 637 (ALT 250 FOR IP)

## 2025-05-05 PROCEDURE — 6360000002 HC RX W HCPCS: Performed by: STUDENT IN AN ORGANIZED HEALTH CARE EDUCATION/TRAINING PROGRAM

## 2025-05-05 PROCEDURE — A9500 TC99M SESTAMIBI: HCPCS | Performed by: INTERNAL MEDICINE

## 2025-05-05 PROCEDURE — 85025 COMPLETE CBC W/AUTO DIFF WBC: CPT

## 2025-05-05 PROCEDURE — 85520 HEPARIN ASSAY: CPT

## 2025-05-05 PROCEDURE — 78452 HT MUSCLE IMAGE SPECT MULT: CPT

## 2025-05-05 PROCEDURE — 93017 CV STRESS TEST TRACING ONLY: CPT

## 2025-05-05 PROCEDURE — 93018 CV STRESS TEST I&R ONLY: CPT | Performed by: INTERNAL MEDICINE

## 2025-05-05 PROCEDURE — 6370000000 HC RX 637 (ALT 250 FOR IP): Performed by: STUDENT IN AN ORGANIZED HEALTH CARE EDUCATION/TRAINING PROGRAM

## 2025-05-05 PROCEDURE — 99232 SBSQ HOSP IP/OBS MODERATE 35: CPT | Performed by: STUDENT IN AN ORGANIZED HEALTH CARE EDUCATION/TRAINING PROGRAM

## 2025-05-05 PROCEDURE — 2500000003 HC RX 250 WO HCPCS: Performed by: STUDENT IN AN ORGANIZED HEALTH CARE EDUCATION/TRAINING PROGRAM

## 2025-05-05 PROCEDURE — 99239 HOSP IP/OBS DSCHRG MGMT >30: CPT | Performed by: STUDENT IN AN ORGANIZED HEALTH CARE EDUCATION/TRAINING PROGRAM

## 2025-05-05 PROCEDURE — 3430000000 HC RX DIAGNOSTIC RADIOPHARMACEUTICAL: Performed by: INTERNAL MEDICINE

## 2025-05-05 PROCEDURE — 83735 ASSAY OF MAGNESIUM: CPT

## 2025-05-05 PROCEDURE — 93016 CV STRESS TEST SUPVJ ONLY: CPT | Performed by: INTERNAL MEDICINE

## 2025-05-05 PROCEDURE — 84100 ASSAY OF PHOSPHORUS: CPT

## 2025-05-05 PROCEDURE — 93306 TTE W/DOPPLER COMPLETE: CPT | Performed by: INTERNAL MEDICINE

## 2025-05-05 PROCEDURE — 80048 BASIC METABOLIC PNL TOTAL CA: CPT

## 2025-05-05 PROCEDURE — 82948 REAGENT STRIP/BLOOD GLUCOSE: CPT

## 2025-05-05 RX ORDER — 0.9 % SODIUM CHLORIDE 0.9 %
1000 INTRAVENOUS SOLUTION INTRAVENOUS ONCE
Status: COMPLETED | OUTPATIENT
Start: 2025-05-05 | End: 2025-05-05

## 2025-05-05 RX ORDER — REGADENOSON 0.08 MG/ML
0.4 INJECTION, SOLUTION INTRAVENOUS
Status: COMPLETED | OUTPATIENT
Start: 2025-05-05 | End: 2025-05-05

## 2025-05-05 RX ORDER — TETRAKIS(2-METHOXYISOBUTYLISOCYANIDE)COPPER(I) TETRAFLUOROBORATE 1 MG/ML
11 INJECTION, POWDER, LYOPHILIZED, FOR SOLUTION INTRAVENOUS
Status: COMPLETED | OUTPATIENT
Start: 2025-05-05 | End: 2025-05-05

## 2025-05-05 RX ORDER — CARVEDILOL 3.12 MG/1
3.12 TABLET ORAL 2 TIMES DAILY WITH MEALS
Qty: 60 TABLET | Refills: 3 | Status: SHIPPED | OUTPATIENT
Start: 2025-05-05

## 2025-05-05 RX ORDER — FUROSEMIDE 20 MG/1
20 TABLET ORAL DAILY
Qty: 60 TABLET | Refills: 3 | Status: SHIPPED | OUTPATIENT
Start: 2025-05-05

## 2025-05-05 RX ORDER — SPIRONOLACTONE 25 MG/1
25 TABLET ORAL DAILY
Qty: 30 TABLET | Refills: 3 | Status: SHIPPED | OUTPATIENT
Start: 2025-05-05

## 2025-05-05 RX ORDER — TETRAKIS(2-METHOXYISOBUTYLISOCYANIDE)COPPER(I) TETRAFLUOROBORATE 1 MG/ML
34.7 INJECTION, POWDER, LYOPHILIZED, FOR SOLUTION INTRAVENOUS
Status: COMPLETED | OUTPATIENT
Start: 2025-05-05 | End: 2025-05-05

## 2025-05-05 RX ADMIN — GABAPENTIN 600 MG: 600 TABLET, FILM COATED ORAL at 10:06

## 2025-05-05 RX ADMIN — CARVEDILOL 3.12 MG: 6.25 TABLET, FILM COATED ORAL at 10:06

## 2025-05-05 RX ADMIN — HEPARIN SODIUM 8 UNITS/KG/HR: 10000 INJECTION, SOLUTION INTRAVENOUS at 02:51

## 2025-05-05 RX ADMIN — SODIUM CHLORIDE 1000 ML: 0.9 INJECTION, SOLUTION INTRAVENOUS at 11:09

## 2025-05-05 RX ADMIN — ASPIRIN 81 MG: 81 TABLET, CHEWABLE ORAL at 10:05

## 2025-05-05 RX ADMIN — TICAGRELOR 90 MG: 90 TABLET ORAL at 10:05

## 2025-05-05 RX ADMIN — INSULIN LISPRO 2 UNITS: 100 INJECTION, SOLUTION INTRAVENOUS; SUBCUTANEOUS at 12:29

## 2025-05-05 RX ADMIN — INSULIN GLARGINE 10 UNITS: 100 INJECTION, SOLUTION SUBCUTANEOUS at 11:07

## 2025-05-05 RX ADMIN — HYDROXYZINE PAMOATE 50 MG: 50 CAPSULE ORAL at 10:05

## 2025-05-05 RX ADMIN — Medication 11 MILLICURIE: at 07:50

## 2025-05-05 RX ADMIN — GABAPENTIN 600 MG: 600 TABLET, FILM COATED ORAL at 14:30

## 2025-05-05 RX ADMIN — SERTRALINE 100 MG: 100 TABLET, FILM COATED ORAL at 10:06

## 2025-05-05 RX ADMIN — REGADENOSON 0.4 MG: 0.08 INJECTION, SOLUTION INTRAVENOUS at 09:07

## 2025-05-05 RX ADMIN — SODIUM CHLORIDE, PRESERVATIVE FREE 10 ML: 5 INJECTION INTRAVENOUS at 10:05

## 2025-05-05 RX ADMIN — Medication 34.7 MILLICURIE: at 09:04

## 2025-05-05 RX ADMIN — FAMOTIDINE 40 MG: 20 TABLET, FILM COATED ORAL at 10:05

## 2025-05-05 ASSESSMENT — PAIN SCALES - GENERAL: PAINLEVEL_OUTOF10: 0

## 2025-05-05 NOTE — PLAN OF CARE
Problem: Chronic Conditions and Co-morbidities  Goal: Patient's chronic conditions and co-morbidity symptoms are monitored and maintained or improved  Outcome: Progressing     Problem: Discharge Planning  Goal: Discharge to home or other facility with appropriate resources  Outcome: Progressing     Problem: Pain  Goal: Verbalizes/displays adequate comfort level or baseline comfort level  Outcome: Progressing     Problem: Safety - Adult  Goal: Free from fall injury  Outcome: Progressing     Problem: Cardiovascular - Adult  Goal: Maintains optimal cardiac output and hemodynamic stability  Outcome: Progressing  Goal: Absence of cardiac dysrhythmias or at baseline  Outcome: Progressing   Care plan reviewed with patient.  Patient verbalizes understanding of the care plan and contributed to goal setting.

## 2025-05-05 NOTE — PROGRESS NOTES
CLINICAL PHARMACY: DISCHARGE MED RECONCILIATION/REVIEW    [facility] Select Patient?: No  Total # of Interventions Recommended: 0   -   Total # Interventions Accepted: 0  Intervention Severity:   - Level 1 Intervention Present?: No   - Level 2 #: 0   - Level 3 #: 0   Time Spent (min): 15    Additional Documentation:      Zen AdamsD, BCPS   5/5/2025  3:42 PM    
Cardiology Progress Note      Patient:  Ashlee Grijalva  YOB: 1970  MRN: [id number]   Acct: [id number]  Admit Date:  5/3/2025  Primary Cardiologist: Krish Rosenberg MD  Note per Dr Rosenberg:  REASON FOR CONSULTATION:  Presentation with left shoulder blade and left shoulder pain for 3 days, which was constant.     HISTORY OF PRESENT ILLNESS:  This is a 54-year-old  female patient with past medical history of coronary artery disease, status post previous bypass in 2012 in [place] and later coronary artery stent in 2023 at [facility] and later coronary artery stent in December 2024 at [facility] and was in usual state of health until 3 days prior to admission.  Three days prior to admission, patient complained started to have left shoulder blade pain with left shoulder pain and radiating to left neck and basically tingling sensation on the left upper arm, and this has been going on for 3 days and nauseous feeling and so did not get better for 3 days and so finally decided on come to the emergency room.  The patient denied having any form of chest pain when she has this shoulder blade pain and gets worse with moving her shoulders and so came to the emergency room.  She was given pain medication and got better and resolved.  Now, she has shoulder pain free.  This was around 5 to 6 out of 10 and no associated symptoms.  Denied any dizziness and constipation, nausea, vomiting, or diarrhea, and the patient has been taking Lasix 20 mg daily and has been increased to 40 mg about 1-1/2 months ago by her primary care doctor and as her creatinine was found to be markedly elevated when she came in.  She has been on 20, and she has some leg swelling and because of that the Lasix apparently seems to be increased and caused her to have an acute kidney injury.  The patient stated that she has done to keep herself well hydrated while taking Lasix, but still renal function got worse, and as I stated, the patient has cardiac 
Completed Echo, Dr. Gil to read.  
[facility]  OCCUPATIONAL THERAPY MISSED TREATMENT NOTE  STRZ ICU STEPDOWN TELEMETRY 4K  4K-11/011-A      Date: 2025  Patient Name: Ashlee Grijalva        CSN: [id number]   : 1970  (54 y.o.)  Gender: female                REASON FOR MISSED TREATMENT:  RN okayed session, however Pt off the floor for stress test.                 
[facility]  PHYSICAL THERAPY MISSED TREATMENT NOTE  STRZ ICU STEPDOWN TELEMETRY 4K    Date: 2025  Patient Name: Ashlee Grijalva        MRN: [id number]   : 1970  (54 y.o.)  Gender: female                REASON FOR MISSED TREATMENT:  Patient at testing and/or off unit. Will check back as able.        
Patient reviewed discharge orders, no questions and concerns. IV and telemetry removed. Patient stated her cane is at home. [facility] cab service transporting to [facility]  
Pt admitted to  4K11 from ED.     Complaints: Left shoulder pain.      IV none infusing into the forearm left, condition patent and no redness. IV site free of s/s of infection or infiltration.     Vital signs obtained. Assessment and data collection initiated. Two nurse skin assessment performed by Juanita RN and Leeann MOLINA.    Swallow Screen completed and documented for all patients ages 45 and older. PASS  If patient fails bedside swallow, obtain order for speech therapy consult and keep patient NPO.    Policies and procedures for 4K explained. All questions answered with no further questions at this time. Fall prevention and safety brochure discussed with patient.  Bed alarm on. Call light in reach. Oriented to room.      
nausea and feeling subjectively warm for about 3 days.  Patient states that she has also feeling shortness of breath at exertion but no shortness of breath at rest which started a day prior to left shoulder/arm pain.   Patient denies any headache, dizziness, abdominal pain, constipation, diarrhea, dysuria.  Patient reports that her Lasix was changed from 20 mg to 40 mg about 1 and half month ago, patient states that she does not feel like she is peeing a lot after taking Lasix.  Patient states that she was trying to keep herself hydrated while taking lasix.  Of note, Patient has cath cath in 12/2024 with 2 stents placed, JT X mid LAS and JT left ostial Lcx. patient was found to have troponins of 56, EKG without any acute ST changes.  Due to extensive cardiac history, patient was admitted for further workup.        ED course: Sodium 140, potassium 4.1, chloride 100, CO2 25, BUN 34, creatinine 1.9, anion gap 15, EGFR 31, magnesium 1.9, glucose 135, calcium 9.9, total protein 7.5, troponin 56, NT-proBNP 256, albumin 4.4, ALP 80, ALT 22, AST 36, T. bili 0.4, WBC 8.3, hemoglobin 13.0, platelets 274  Chest x-ray without any acute intrathoracic process  Left shoulder x-ray without any acute finding\"    Medications:    Infusion Medications    sodium chloride      dextrose      sodium chloride 100 mL/hr at 05/03/25 2300    heparin (PORCINE) Infusion 8 Units/kg/hr (05/04/25 0543)    Scheduled Medications    hydrOXYzine pamoate  50 mg Oral BID    sertraline  100 mg Oral Daily    levothyroxine  75 mcg Oral QAM AC    carvedilol  3.125 mg Oral BID WC    sodium chloride flush  5-40 mL IntraVENous 2 times per day    aspirin  81 mg Oral Daily    atorvastatin  80 mg Oral Nightly    famotidine  40 mg Oral Daily    [Held by provider] furosemide  20 mg Oral Daily    [Held by provider] insulin lispro  15 Units SubCUTAneous TID AC    [Held by provider] losartan  25 mg Oral Daily    ticagrelor  90 mg Oral BID    [Held by provider]

## 2025-05-05 NOTE — DISCHARGE SUMMARY
Resident Discharge Summary (Hospitalist)      Patient: Ashlee LUI Rudi 54 y.o. female  : 1970  MRN: [id number]   Account: [id number]   Patient's PCP: Bhavya Ojeda APRN - CNP    Admit Date: 5/3/2025   Discharge Date:   25     Admitting Physician: No admitting provider for patient encounter.  Discharge Physician: Saba Boone MD       Discharge Diagnoses:  Atypical chest pain with elevated troponin: Patient presented with left shoulder pain, radiating to the left neck, associated with right upper arm tingling.  She reports that she has also been experiencing shortness of breath.  The symptoms have been persisting for about 3 to 4 days.  Troponin 56 > 50 > 53.  EKG showed no signs of acute ischemia. Patient has extensive cardiac history with recent cardiac cath in 2024 with PCI of the mid LAD x 1 JT & Successful PCI of the ostial LCX ISR x 1 JT with patent SVG to PDA.Prior Hx of JT in the proximal circumflex. 2023 / CABG x1 . Patient discharged with DAPT with ASA 81 and Brilinta 90 BID.  Patient was admitted due to heart score of 6. Last ECHO on 2024 with ejection fraction of 60 to 65% with grade 1 diastolic dysfunction with normal LAP.  Continue DAPT  PREM, improving: Creatinine on admission 1.9, baseline 0.9.  Patient had Lasix dose increased from 20-40 approximately a month and a half before admission.  BUN/creatinine ratio 17:1.  Renal ultrasound showed nonobstructive 5 mm calculus in the lower pole of the left kidney.  Restarted home Lasix at reduced 20mg  Resume home Losartan and Aldactone     Chronic Conditions (reviewed and stable unless otherwise stated)  CAD s/p CABG: Patient has a history of JT to proximal circumflex 2023/CABG in .  Follows with Dr. Rosenberg outpatient.  HFpEF: ECHO on 2024 with ejection fraction of 60 to 65% with grade 1 diastolic dysfunction with normal LAP.  Insulin-dependent diabetes mellitus type 2  Peripheral neuropathy 2/2

## 2025-05-05 NOTE — CARE COORDINATION
Case Management Assessment Initial Evaluation    Date/Time of Evaluation: 5/5/2025 11:33 AM  Assessment Completed by: Mp Huddleston RN    If patient is discharged prior to next notation, then this note serves as note for discharge by case management.    Patient Name: Ashlee Grijalva                   YOB: 1970  Diagnosis: Shortness of breath [R06.02]  Shoulder pain [M25.519]  Elevated troponin [R79.89]  Acute kidney injury [N17.9]  Chest pain, unspecified type [R07.9]                   Date / Time: 5/3/2025  6:49 PM  Location: [facility]     Patient Admission Status: Inpatient   Readmission Risk Low 0-14, Mod 15-19), High > 20: Readmission Risk Score: 13.4    Current PCP: Bhavya Ojeda APRN - CNP  Health Care Decision Makers:     Additional Case Management Notes:   PREM/Shoulder Pain, CP, Renal Stone  PMH: PCI, CHF, DM, Hepatitis C, Heroin/Meth Use, CABG/AVR, Active Smoker  Heparin Gtt  Await Stress Test  Await ECHO    Procedures:   5/5 Stress Test planned  5/5 ECHO planned    Patient Goals/Plan/Treatment Preferences: plans return [facility] (recovery house_, K/P Transportation, has cane, walker           05/05/25 1129   Service Assessment   Patient Orientation Alert and Oriented   Cognition Alert   History Provided By Patient;Medical Record   Primary Caregiver Self   Accompanied By/Relationship none   Support Systems Other (Comment)  ([facility])   Patient's Healthcare Decision Maker is: Legal Next of Kin   PCP Verified by CM Yes   Last Visit to PCP Within last 3 months   Prior Functional Level Independent in ADLs/IADLs   Current Functional Level Independent in ADLs/IADLs   Can patient return to prior living arrangement Yes   Ability to make needs known: Good   Family able to assist with home care needs: No   Would you like for me to discuss the discharge plan with any other family members/significant others, and if so, who? No   Financial Resources Medicaid Community

## 2025-05-05 NOTE — PLAN OF CARE
Problem: Chronic Conditions and Co-morbidities  Goal: Patient's chronic conditions and co-morbidity symptoms are monitored and maintained or improved  5/4/2025 2101 by Gabby Villarreal RN  Outcome: Progressing  Flowsheets (Taken 5/4/2025 2101)  Care Plan - Patient's Chronic Conditions and Co-Morbidity Symptoms are Monitored and Maintained or Improved:   Monitor and assess patient's chronic conditions and comorbid symptoms for stability, deterioration, or improvement   Collaborate with multidisciplinary team to address chronic and comorbid conditions and prevent exacerbation or deterioration   Update acute care plan with appropriate goals if chronic or comorbid symptoms are exacerbated and prevent overall improvement and discharge     Problem: Discharge Planning  Goal: Discharge to home or other facility with appropriate resources  5/4/2025 2101 by Gabby Villarreal RN  Outcome: Progressing  Flowsheets (Taken 5/4/2025 2101)  Discharge to home or other facility with appropriate resources:   Identify barriers to discharge with patient and caregiver   Arrange for needed discharge resources and transportation as appropriate   Identify discharge learning needs (meds, wound care, etc)     Problem: Pain  Goal: Verbalizes/displays adequate comfort level or baseline comfort level  5/4/2025 2101 by Gabby Villarreal RN  Outcome: Progressing  Flowsheets (Taken 5/4/2025 2101)  Verbalizes/displays adequate comfort level or baseline comfort level:   Encourage patient to monitor pain and request assistance   Assess pain using appropriate pain scale   Administer analgesics based on type and severity of pain and evaluate response   Implement non-pharmacological measures as appropriate and evaluate response     Problem: Safety - Adult  Goal: Free from fall injury  5/4/2025 2101 by Gabby Villarreal RN  Outcome: Progressing  Flowsheets (Taken 5/4/2025 2101)  Free From Fall Injury: Instruct family/caregiver on patient safety

## 2025-05-14 ENCOUNTER — TRANSCRIBE ORDERS (OUTPATIENT)
Dept: ADMINISTRATIVE | Age: 55
End: 2025-05-14

## 2025-05-14 DIAGNOSIS — Z12.31 ENCOUNTER FOR SCREENING MAMMOGRAM FOR MALIGNANT NEOPLASM OF BREAST: Primary | ICD-10-CM

## 2025-05-16 ENCOUNTER — OFFICE VISIT (OUTPATIENT)
Dept: CARDIOLOGY CLINIC | Age: 55
End: 2025-05-16
Payer: MEDICAID

## 2025-05-16 VITALS
BODY MASS INDEX: 44.08 KG/M2 | WEIGHT: 264.6 LBS | HEIGHT: 65 IN | HEART RATE: 77 BPM | DIASTOLIC BLOOD PRESSURE: 51 MMHG | SYSTOLIC BLOOD PRESSURE: 93 MMHG

## 2025-05-16 DIAGNOSIS — N18.32 STAGE 3B CHRONIC KIDNEY DISEASE (HCC): ICD-10-CM

## 2025-05-16 DIAGNOSIS — R06.02 SOB (SHORTNESS OF BREATH) ON EXERTION: ICD-10-CM

## 2025-05-16 DIAGNOSIS — I25.10 CORONARY ARTERY DISEASE INVOLVING NATIVE CORONARY ARTERY OF NATIVE HEART WITHOUT ANGINA PECTORIS: Primary | ICD-10-CM

## 2025-05-16 DIAGNOSIS — I10 ESSENTIAL HYPERTENSION: ICD-10-CM

## 2025-05-16 DIAGNOSIS — E78.5 DYSLIPIDEMIA: ICD-10-CM

## 2025-05-16 DIAGNOSIS — E66.01 MORBID OBESITY (HCC): ICD-10-CM

## 2025-05-16 DIAGNOSIS — I50.32 CHRONIC DIASTOLIC CHF (CONGESTIVE HEART FAILURE) (HCC): ICD-10-CM

## 2025-05-16 PROBLEM — N18.30 STAGE 3 CHRONIC KIDNEY DISEASE (HCC): Status: ACTIVE | Noted: 2025-05-16

## 2025-05-16 PROCEDURE — 3017F COLORECTAL CA SCREEN DOC REV: CPT | Performed by: INTERNAL MEDICINE

## 2025-05-16 PROCEDURE — G8427 DOCREV CUR MEDS BY ELIG CLIN: HCPCS | Performed by: INTERNAL MEDICINE

## 2025-05-16 PROCEDURE — 3078F DIAST BP <80 MM HG: CPT | Performed by: INTERNAL MEDICINE

## 2025-05-16 PROCEDURE — 99214 OFFICE O/P EST MOD 30 MIN: CPT | Performed by: INTERNAL MEDICINE

## 2025-05-16 PROCEDURE — 4004F PT TOBACCO SCREEN RCVD TLK: CPT | Performed by: INTERNAL MEDICINE

## 2025-05-16 PROCEDURE — 1111F DSCHRG MED/CURRENT MED MERGE: CPT | Performed by: INTERNAL MEDICINE

## 2025-05-16 PROCEDURE — 3074F SYST BP LT 130 MM HG: CPT | Performed by: INTERNAL MEDICINE

## 2025-05-16 PROCEDURE — G8417 CALC BMI ABV UP PARAM F/U: HCPCS | Performed by: INTERNAL MEDICINE

## 2025-05-16 RX ORDER — SPIRONOLACTONE 25 MG/1
12.5 TABLET ORAL DAILY
Qty: 15 TABLET | Refills: 5
Start: 2025-05-16

## 2025-05-16 NOTE — PROGRESS NOTES
bp  Hydration 64 oz       Hyperlipidemia: on statins, followed periodically. Patient need periodic lipid and liver profile.    Hypertension, on medical treatment. Seems to be under good control. Patient is compliant with medical treatment.       Patient Seen, Chart, Consults notes, Labs, Radiology studies reviewed.    Lipid panel and liver function test before next appointment    Smoking: discussed with the patient the importance of smoke cessation especially with the risk of CAD.      Stable and doing well    The patient is advised to attempt to improve diet and exercise patterns to aid in medical management of this problem.  Advised more plant based nutrition/meditarrean diet   Advised patient to call office or seek immediate medical attention if there is any new onset of  any chest pain, sob, palpitations, lightheadedness, dizziness, orthopnea, PND or pedal edema.   All medication side effects were discussed in details.         RTC in 3 months        Krish Rosenberg MD,MD,FACC

## 2025-06-03 RX ORDER — TICAGRELOR 90 MG/1
90 TABLET, FILM COATED ORAL 2 TIMES DAILY
Qty: 180 TABLET | Refills: 3 | Status: SHIPPED | OUTPATIENT
Start: 2025-06-03

## 2025-06-27 ENCOUNTER — APPOINTMENT (OUTPATIENT)
Dept: GENERAL RADIOLOGY | Age: 55
End: 2025-06-27
Payer: MEDICAID

## 2025-06-27 ENCOUNTER — HOSPITAL ENCOUNTER (OUTPATIENT)
Age: 55
Setting detail: OBSERVATION
Discharge: HOME | End: 2025-06-29
Attending: EMERGENCY MEDICINE | Admitting: STUDENT IN AN ORGANIZED HEALTH CARE EDUCATION/TRAINING PROGRAM
Payer: MEDICAID

## 2025-06-27 DIAGNOSIS — I25.110 CORONARY ARTERY DISEASE INVOLVING NATIVE CORONARY ARTERY OF NATIVE HEART WITH UNSTABLE ANGINA PECTORIS (HCC): ICD-10-CM

## 2025-06-27 DIAGNOSIS — R07.9 CHEST PAIN, UNSPECIFIED TYPE: Primary | ICD-10-CM

## 2025-06-27 LAB
ALBUMIN SERPL BCG-MCNC: 3.8 G/DL (ref 3.4–4.9)
ALP SERPL-CCNC: 85 U/L (ref 38–126)
ALT SERPL W/O P-5'-P-CCNC: 16 U/L (ref 10–35)
ANION GAP SERPL CALC-SCNC: 14 MEQ/L (ref 8–16)
AST SERPL-CCNC: 26 U/L (ref 10–35)
BASOPHILS ABSOLUTE: 0 THOU/MM3 (ref 0–0.1)
BASOPHILS NFR BLD AUTO: 0.3 %
BILIRUB SERPL-MCNC: 0.4 MG/DL (ref 0.3–1.2)
BUN SERPL-MCNC: 16 MG/DL (ref 8–23)
CALCIUM SERPL-MCNC: 9.6 MG/DL (ref 8.6–10)
CHLORIDE SERPL-SCNC: 103 MEQ/L (ref 98–111)
CO2 SERPL-SCNC: 20 MEQ/L (ref 22–29)
CREAT SERPL-MCNC: 1.3 MG/DL (ref 0.5–0.9)
D DIMER PPP IA.FEU-MCNC: 292 NG/ML FEU (ref 0–500)
DEPRECATED RDW RBC AUTO: 40.7 FL (ref 35–45)
EOSINOPHIL NFR BLD AUTO: 4.5 %
EOSINOPHILS ABSOLUTE: 0.4 THOU/MM3 (ref 0–0.4)
ERYTHROCYTE [DISTWIDTH] IN BLOOD BY AUTOMATED COUNT: 13.8 % (ref 11.5–14.5)
GFR SERPL CREATININE-BSD FRML MDRD: 49 ML/MIN/1.73M2
GLUCOSE SERPL-MCNC: 238 MG/DL (ref 74–109)
HCT VFR BLD AUTO: 36.6 % (ref 37–47)
HGB BLD-MCNC: 12.5 GM/DL (ref 12–16)
IMM GRANULOCYTES # BLD AUTO: 0.02 THOU/MM3 (ref 0–0.07)
IMM GRANULOCYTES NFR BLD AUTO: 0.2 %
LYMPHOCYTES ABSOLUTE: 3.1 THOU/MM3 (ref 1–4.8)
LYMPHOCYTES NFR BLD AUTO: 35.5 %
MAGNESIUM SERPL-MCNC: 1.8 MG/DL (ref 1.6–2.6)
MCH RBC QN AUTO: 28 PG (ref 26–33)
MCHC RBC AUTO-ENTMCNC: 34.2 GM/DL (ref 32.2–35.5)
MCV RBC AUTO: 81.9 FL (ref 81–99)
MONOCYTES ABSOLUTE: 0.5 THOU/MM3 (ref 0.4–1.3)
MONOCYTES NFR BLD AUTO: 6.2 %
NEUTROPHILS ABSOLUTE: 4.6 THOU/MM3 (ref 1.8–7.7)
NEUTROPHILS NFR BLD AUTO: 53.3 %
NRBC BLD AUTO-RTO: 0 /100 WBC
NT-PROBNP SERPL IA-MCNC: 387 PG/ML (ref 0–124)
OSMOLALITY SERPL CALC.SUM OF ELEC: 282.8 MOSMOL/KG (ref 275–300)
PLATELET # BLD AUTO: 257 THOU/MM3 (ref 130–400)
PMV BLD AUTO: 9.7 FL (ref 9.4–12.4)
POTASSIUM SERPL-SCNC: 3.9 MEQ/L (ref 3.5–5.2)
PROT SERPL-MCNC: 6.6 G/DL (ref 6.4–8.3)
RBC # BLD AUTO: 4.47 MILL/MM3 (ref 4.2–5.4)
SODIUM SERPL-SCNC: 137 MEQ/L (ref 135–145)
TROPONIN, HIGH SENSITIVITY: 45 NG/L (ref 0–12)
WBC # BLD AUTO: 8.7 THOU/MM3 (ref 4.8–10.8)

## 2025-06-27 PROCEDURE — 6370000000 HC RX 637 (ALT 250 FOR IP)

## 2025-06-27 PROCEDURE — 93005 ELECTROCARDIOGRAM TRACING: CPT

## 2025-06-27 PROCEDURE — 80053 COMPREHEN METABOLIC PANEL: CPT

## 2025-06-27 PROCEDURE — 83880 ASSAY OF NATRIURETIC PEPTIDE: CPT

## 2025-06-27 PROCEDURE — 83735 ASSAY OF MAGNESIUM: CPT

## 2025-06-27 PROCEDURE — 36415 COLL VENOUS BLD VENIPUNCTURE: CPT

## 2025-06-27 PROCEDURE — 99285 EMERGENCY DEPT VISIT HI MDM: CPT

## 2025-06-27 PROCEDURE — 85025 COMPLETE CBC W/AUTO DIFF WBC: CPT

## 2025-06-27 PROCEDURE — 85379 FIBRIN DEGRADATION QUANT: CPT

## 2025-06-27 PROCEDURE — 84484 ASSAY OF TROPONIN QUANT: CPT

## 2025-06-27 PROCEDURE — 71045 X-RAY EXAM CHEST 1 VIEW: CPT

## 2025-06-27 RX ORDER — ASPIRIN 81 MG/1
324 TABLET, CHEWABLE ORAL ONCE
Status: COMPLETED | OUTPATIENT
Start: 2025-06-28 | End: 2025-06-27

## 2025-06-27 RX ADMIN — ASPIRIN 243 MG: 81 TABLET, CHEWABLE ORAL at 23:55

## 2025-06-27 ASSESSMENT — PAIN - FUNCTIONAL ASSESSMENT
PAIN_FUNCTIONAL_ASSESSMENT: 0-10
PAIN_FUNCTIONAL_ASSESSMENT: 0-10

## 2025-06-27 ASSESSMENT — PAIN DESCRIPTION - LOCATION
LOCATION: CHEST
LOCATION: CHEST

## 2025-06-27 ASSESSMENT — HEART SCORE: ECG: NORMAL

## 2025-06-27 ASSESSMENT — PAIN SCALES - GENERAL
PAINLEVEL_OUTOF10: 4
PAINLEVEL_OUTOF10: 4

## 2025-06-27 ASSESSMENT — PAIN DESCRIPTION - PAIN TYPE: TYPE: ACUTE PAIN

## 2025-06-28 PROBLEM — I50.32 CHRONIC HEART FAILURE WITH PRESERVED EJECTION FRACTION (HFPEF) (HCC): Status: ACTIVE | Noted: 2025-06-28

## 2025-06-28 PROBLEM — N17.9 ACUTE KIDNEY INJURY SUPERIMPOSED ON CKD: Status: ACTIVE | Noted: 2025-06-28

## 2025-06-28 PROBLEM — R07.9 CHEST PAIN: Status: ACTIVE | Noted: 2025-06-28

## 2025-06-28 PROBLEM — N18.9 ACUTE KIDNEY INJURY SUPERIMPOSED ON CKD: Status: ACTIVE | Noted: 2025-06-28

## 2025-06-28 PROBLEM — I10 PRIMARY HYPERTENSION: Status: ACTIVE | Noted: 2025-06-28

## 2025-06-28 PROBLEM — R94.31 ABNORMAL ECG: Status: ACTIVE | Noted: 2025-06-28

## 2025-06-28 LAB
ANION GAP SERPL CALC-SCNC: 11 MEQ/L (ref 8–16)
BUN SERPL-MCNC: 17 MG/DL (ref 8–23)
CALCIUM SERPL-MCNC: 9.2 MG/DL (ref 8.6–10)
CHLORIDE SERPL-SCNC: 103 MEQ/L (ref 98–111)
CO2 SERPL-SCNC: 25 MEQ/L (ref 22–29)
CREAT SERPL-MCNC: 1.3 MG/DL (ref 0.5–0.9)
GFR SERPL CREATININE-BSD FRML MDRD: 49 ML/MIN/1.73M2
GLUCOSE BLD STRIP.AUTO-MCNC: 139 MG/DL (ref 70–108)
GLUCOSE BLD STRIP.AUTO-MCNC: 154 MG/DL (ref 70–108)
GLUCOSE BLD STRIP.AUTO-MCNC: 189 MG/DL (ref 70–108)
GLUCOSE BLD STRIP.AUTO-MCNC: 207 MG/DL (ref 70–108)
GLUCOSE SERPL-MCNC: 154 MG/DL (ref 74–109)
OSMOLALITY SERPL CALC.SUM OF ELEC: 282.2 MOSMOL/KG (ref 275–300)
POTASSIUM SERPL-SCNC: 3.9 MEQ/L (ref 3.5–5.2)
SODIUM SERPL-SCNC: 139 MEQ/L (ref 135–145)
TROPONIN, HIGH SENSITIVITY: 43 NG/L (ref 0–12)
TROPONIN, HIGH SENSITIVITY: 44 NG/L (ref 0–12)

## 2025-06-28 PROCEDURE — G0378 HOSPITAL OBSERVATION PER HR: HCPCS

## 2025-06-28 PROCEDURE — 84484 ASSAY OF TROPONIN QUANT: CPT

## 2025-06-28 PROCEDURE — 96361 HYDRATE IV INFUSION ADD-ON: CPT

## 2025-06-28 PROCEDURE — 6360000002 HC RX W HCPCS: Performed by: PHYSICIAN ASSISTANT

## 2025-06-28 PROCEDURE — 2580000003 HC RX 258: Performed by: PHYSICIAN ASSISTANT

## 2025-06-28 PROCEDURE — 96360 HYDRATION IV INFUSION INIT: CPT

## 2025-06-28 PROCEDURE — 2500000003 HC RX 250 WO HCPCS: Performed by: PHYSICIAN ASSISTANT

## 2025-06-28 PROCEDURE — 96372 THER/PROPH/DIAG INJ SC/IM: CPT

## 2025-06-28 PROCEDURE — 6370000000 HC RX 637 (ALT 250 FOR IP): Performed by: PHYSICIAN ASSISTANT

## 2025-06-28 PROCEDURE — 36415 COLL VENOUS BLD VENIPUNCTURE: CPT

## 2025-06-28 PROCEDURE — 99223 1ST HOSP IP/OBS HIGH 75: CPT | Performed by: PHYSICIAN ASSISTANT

## 2025-06-28 PROCEDURE — 99223 1ST HOSP IP/OBS HIGH 75: CPT | Performed by: INTERNAL MEDICINE

## 2025-06-28 PROCEDURE — 82948 REAGENT STRIP/BLOOD GLUCOSE: CPT

## 2025-06-28 PROCEDURE — 80048 BASIC METABOLIC PNL TOTAL CA: CPT

## 2025-06-28 PROCEDURE — 93005 ELECTROCARDIOGRAM TRACING: CPT | Performed by: PHYSICIAN ASSISTANT

## 2025-06-28 RX ORDER — GABAPENTIN 600 MG/1
600 TABLET ORAL 3 TIMES DAILY
Status: DISCONTINUED | OUTPATIENT
Start: 2025-06-28 | End: 2025-06-29 | Stop reason: HOSPADM

## 2025-06-28 RX ORDER — LEVOTHYROXINE SODIUM 75 UG/1
75 TABLET ORAL
Status: DISCONTINUED | OUTPATIENT
Start: 2025-06-28 | End: 2025-06-29 | Stop reason: HOSPADM

## 2025-06-28 RX ORDER — HYDROXYZINE PAMOATE 50 MG/1
50 CAPSULE ORAL 2 TIMES DAILY PRN
Status: DISCONTINUED | OUTPATIENT
Start: 2025-06-28 | End: 2025-06-29 | Stop reason: HOSPADM

## 2025-06-28 RX ORDER — ARIPIPRAZOLE 10 MG/1
10 TABLET ORAL DAILY
Status: DISCONTINUED | OUTPATIENT
Start: 2025-06-28 | End: 2025-06-29 | Stop reason: HOSPADM

## 2025-06-28 RX ORDER — ACETAMINOPHEN 650 MG/1
650 SUPPOSITORY RECTAL EVERY 6 HOURS PRN
Status: DISCONTINUED | OUTPATIENT
Start: 2025-06-28 | End: 2025-06-29 | Stop reason: HOSPADM

## 2025-06-28 RX ORDER — ONDANSETRON 4 MG/1
4 TABLET, ORALLY DISINTEGRATING ORAL EVERY 8 HOURS PRN
Status: DISCONTINUED | OUTPATIENT
Start: 2025-06-28 | End: 2025-06-29 | Stop reason: HOSPADM

## 2025-06-28 RX ORDER — INSULIN LISPRO 100 [IU]/ML
0-8 INJECTION, SOLUTION INTRAVENOUS; SUBCUTANEOUS
Status: DISCONTINUED | OUTPATIENT
Start: 2025-06-28 | End: 2025-06-29 | Stop reason: HOSPADM

## 2025-06-28 RX ORDER — CARVEDILOL 3.12 MG/1
3.12 TABLET ORAL 2 TIMES DAILY WITH MEALS
Status: DISCONTINUED | OUTPATIENT
Start: 2025-06-28 | End: 2025-06-29 | Stop reason: HOSPADM

## 2025-06-28 RX ORDER — SODIUM CHLORIDE 0.9 % (FLUSH) 0.9 %
10 SYRINGE (ML) INJECTION EVERY 12 HOURS SCHEDULED
Status: DISCONTINUED | OUTPATIENT
Start: 2025-06-28 | End: 2025-06-29 | Stop reason: HOSPADM

## 2025-06-28 RX ORDER — ENOXAPARIN SODIUM 100 MG/ML
30 INJECTION SUBCUTANEOUS 2 TIMES DAILY
Status: DISCONTINUED | OUTPATIENT
Start: 2025-06-28 | End: 2025-06-29 | Stop reason: HOSPADM

## 2025-06-28 RX ORDER — ACETAMINOPHEN 325 MG/1
650 TABLET ORAL EVERY 6 HOURS PRN
Status: DISCONTINUED | OUTPATIENT
Start: 2025-06-28 | End: 2025-06-29 | Stop reason: HOSPADM

## 2025-06-28 RX ORDER — SODIUM CHLORIDE, SODIUM LACTATE, POTASSIUM CHLORIDE, AND CALCIUM CHLORIDE .6; .31; .03; .02 G/100ML; G/100ML; G/100ML; G/100ML
500 INJECTION, SOLUTION INTRAVENOUS ONCE
Status: COMPLETED | OUTPATIENT
Start: 2025-06-28 | End: 2025-06-28

## 2025-06-28 RX ORDER — ASPIRIN 81 MG/1
81 TABLET, CHEWABLE ORAL DAILY
Status: DISCONTINUED | OUTPATIENT
Start: 2025-06-28 | End: 2025-06-29 | Stop reason: HOSPADM

## 2025-06-28 RX ORDER — SERTRALINE HYDROCHLORIDE 100 MG/1
100 TABLET, FILM COATED ORAL DAILY
Status: DISCONTINUED | OUTPATIENT
Start: 2025-06-28 | End: 2025-06-29 | Stop reason: HOSPADM

## 2025-06-28 RX ORDER — LOSARTAN POTASSIUM 25 MG/1
25 TABLET ORAL DAILY
Status: DISCONTINUED | OUTPATIENT
Start: 2025-06-28 | End: 2025-06-29 | Stop reason: HOSPADM

## 2025-06-28 RX ORDER — SODIUM CHLORIDE 9 MG/ML
INJECTION, SOLUTION INTRAVENOUS PRN
Status: DISCONTINUED | OUTPATIENT
Start: 2025-06-28 | End: 2025-06-29 | Stop reason: HOSPADM

## 2025-06-28 RX ORDER — DOXEPIN HYDROCHLORIDE 10 MG/1
10 CAPSULE ORAL NIGHTLY
COMMUNITY

## 2025-06-28 RX ORDER — POTASSIUM CHLORIDE 7.45 MG/ML
10 INJECTION INTRAVENOUS PRN
Status: DISCONTINUED | OUTPATIENT
Start: 2025-06-28 | End: 2025-06-29 | Stop reason: HOSPADM

## 2025-06-28 RX ORDER — DOXEPIN HYDROCHLORIDE 10 MG/1
10 CAPSULE ORAL NIGHTLY
Status: DISCONTINUED | OUTPATIENT
Start: 2025-06-28 | End: 2025-06-29 | Stop reason: HOSPADM

## 2025-06-28 RX ORDER — GLUCAGON 1 MG/ML
1 KIT INJECTION PRN
Status: DISCONTINUED | OUTPATIENT
Start: 2025-06-28 | End: 2025-06-29 | Stop reason: HOSPADM

## 2025-06-28 RX ORDER — INSULIN GLARGINE 100 [IU]/ML
32 INJECTION, SOLUTION SUBCUTANEOUS EVERY MORNING
Status: DISCONTINUED | OUTPATIENT
Start: 2025-06-28 | End: 2025-06-29 | Stop reason: HOSPADM

## 2025-06-28 RX ORDER — POLYETHYLENE GLYCOL 3350 17 G/17G
17 POWDER, FOR SOLUTION ORAL DAILY PRN
Status: DISCONTINUED | OUTPATIENT
Start: 2025-06-28 | End: 2025-06-29 | Stop reason: HOSPADM

## 2025-06-28 RX ORDER — NITROGLYCERIN 0.4 MG/1
0.4 TABLET SUBLINGUAL EVERY 5 MIN PRN
Status: DISCONTINUED | OUTPATIENT
Start: 2025-06-28 | End: 2025-06-29 | Stop reason: HOSPADM

## 2025-06-28 RX ORDER — FENOFIBRATE 160 MG/1
160 TABLET ORAL DAILY
Status: DISCONTINUED | OUTPATIENT
Start: 2025-06-28 | End: 2025-06-29 | Stop reason: HOSPADM

## 2025-06-28 RX ORDER — ATORVASTATIN CALCIUM 80 MG/1
80 TABLET, FILM COATED ORAL NIGHTLY
Status: DISCONTINUED | OUTPATIENT
Start: 2025-06-28 | End: 2025-06-29 | Stop reason: HOSPADM

## 2025-06-28 RX ORDER — ONDANSETRON 2 MG/ML
4 INJECTION INTRAMUSCULAR; INTRAVENOUS EVERY 6 HOURS PRN
Status: DISCONTINUED | OUTPATIENT
Start: 2025-06-28 | End: 2025-06-29 | Stop reason: HOSPADM

## 2025-06-28 RX ORDER — SODIUM CHLORIDE 0.9 % (FLUSH) 0.9 %
10 SYRINGE (ML) INJECTION PRN
Status: DISCONTINUED | OUTPATIENT
Start: 2025-06-28 | End: 2025-06-29 | Stop reason: HOSPADM

## 2025-06-28 RX ORDER — TICAGRELOR 90 MG/1
90 TABLET, FILM COATED ORAL 2 TIMES DAILY
Status: DISCONTINUED | OUTPATIENT
Start: 2025-06-28 | End: 2025-06-29 | Stop reason: HOSPADM

## 2025-06-28 RX ORDER — POTASSIUM CHLORIDE 1500 MG/1
40 TABLET, EXTENDED RELEASE ORAL PRN
Status: DISCONTINUED | OUTPATIENT
Start: 2025-06-28 | End: 2025-06-29 | Stop reason: HOSPADM

## 2025-06-28 RX ORDER — DEXTROSE MONOHYDRATE 100 MG/ML
INJECTION, SOLUTION INTRAVENOUS CONTINUOUS PRN
Status: DISCONTINUED | OUTPATIENT
Start: 2025-06-28 | End: 2025-06-29 | Stop reason: HOSPADM

## 2025-06-28 RX ORDER — FAMOTIDINE 20 MG/1
20 TABLET, FILM COATED ORAL DAILY
Status: DISCONTINUED | OUTPATIENT
Start: 2025-06-28 | End: 2025-06-29 | Stop reason: HOSPADM

## 2025-06-28 RX ORDER — MAGNESIUM SULFATE IN WATER 40 MG/ML
2000 INJECTION, SOLUTION INTRAVENOUS PRN
Status: DISCONTINUED | OUTPATIENT
Start: 2025-06-28 | End: 2025-06-29 | Stop reason: HOSPADM

## 2025-06-28 RX ORDER — SPIRONOLACTONE 25 MG/1
25 TABLET ORAL DAILY
Status: DISCONTINUED | OUTPATIENT
Start: 2025-06-28 | End: 2025-06-29 | Stop reason: HOSPADM

## 2025-06-28 RX ADMIN — INSULIN LISPRO 2 UNITS: 100 INJECTION, SOLUTION INTRAVENOUS; SUBCUTANEOUS at 13:33

## 2025-06-28 RX ADMIN — TICAGRELOR 90 MG: 90 TABLET ORAL at 08:17

## 2025-06-28 RX ADMIN — LOSARTAN POTASSIUM 25 MG: 25 TABLET, FILM COATED ORAL at 08:17

## 2025-06-28 RX ADMIN — ARIPIPRAZOLE 10 MG: 10 TABLET ORAL at 08:17

## 2025-06-28 RX ADMIN — SERTRALINE 100 MG: 100 TABLET, FILM COATED ORAL at 08:17

## 2025-06-28 RX ADMIN — ENOXAPARIN SODIUM 30 MG: 100 INJECTION SUBCUTANEOUS at 20:34

## 2025-06-28 RX ADMIN — ENOXAPARIN SODIUM 30 MG: 100 INJECTION SUBCUTANEOUS at 02:43

## 2025-06-28 RX ADMIN — CARVEDILOL 3.12 MG: 3.12 TABLET, FILM COATED ORAL at 08:17

## 2025-06-28 RX ADMIN — INSULIN LISPRO 2 UNITS: 100 INJECTION, SOLUTION INTRAVENOUS; SUBCUTANEOUS at 20:53

## 2025-06-28 RX ADMIN — ASPIRIN 81 MG: 81 TABLET, CHEWABLE ORAL at 08:17

## 2025-06-28 RX ADMIN — ENOXAPARIN SODIUM 30 MG: 100 INJECTION SUBCUTANEOUS at 08:17

## 2025-06-28 RX ADMIN — ATORVASTATIN CALCIUM 80 MG: 80 TABLET, FILM COATED ORAL at 20:33

## 2025-06-28 RX ADMIN — INSULIN GLARGINE 32 UNITS: 100 INJECTION, SOLUTION SUBCUTANEOUS at 09:56

## 2025-06-28 RX ADMIN — FAMOTIDINE 20 MG: 20 TABLET, FILM COATED ORAL at 08:17

## 2025-06-28 RX ADMIN — CARVEDILOL 3.12 MG: 3.12 TABLET, FILM COATED ORAL at 17:33

## 2025-06-28 RX ADMIN — LEVOTHYROXINE SODIUM 75 MCG: 0.07 TABLET ORAL at 06:41

## 2025-06-28 RX ADMIN — FENOFIBRATE 160 MG: 160 TABLET ORAL at 08:17

## 2025-06-28 RX ADMIN — GABAPENTIN 600 MG: 600 TABLET, FILM COATED ORAL at 08:17

## 2025-06-28 RX ADMIN — GABAPENTIN 600 MG: 600 TABLET, FILM COATED ORAL at 16:09

## 2025-06-28 RX ADMIN — GABAPENTIN 600 MG: 600 TABLET, FILM COATED ORAL at 20:33

## 2025-06-28 RX ADMIN — SODIUM CHLORIDE, PRESERVATIVE FREE 10 ML: 5 INJECTION INTRAVENOUS at 20:35

## 2025-06-28 RX ADMIN — DOXEPIN HYDROCHLORIDE 10 MG: 10 CAPSULE ORAL at 20:34

## 2025-06-28 RX ADMIN — SODIUM CHLORIDE, SODIUM LACTATE, POTASSIUM CHLORIDE, AND CALCIUM CHLORIDE 500 ML: .6; .31; .03; .02 INJECTION, SOLUTION INTRAVENOUS at 05:46

## 2025-06-28 RX ADMIN — TICAGRELOR 90 MG: 90 TABLET ORAL at 20:33

## 2025-06-28 ASSESSMENT — PAIN SCALES - GENERAL
PAINLEVEL_OUTOF10: 3
PAINLEVEL_OUTOF10: 1
PAINLEVEL_OUTOF10: 0

## 2025-06-28 ASSESSMENT — PAIN DESCRIPTION - DESCRIPTORS: DESCRIPTORS: PRESSURE

## 2025-06-28 ASSESSMENT — PAIN - FUNCTIONAL ASSESSMENT: PAIN_FUNCTIONAL_ASSESSMENT: 0-10

## 2025-06-28 ASSESSMENT — PAIN DESCRIPTION - LOCATION: LOCATION: CHEST

## 2025-06-28 ASSESSMENT — PAIN DESCRIPTION - ORIENTATION: ORIENTATION: LEFT

## 2025-06-28 NOTE — ED NOTES
Pt sleeping at this time. Wakes to voice. Updated on plan of care. Voiced no needs. Call light in reach.

## 2025-06-28 NOTE — ED NOTES
Pt reassessed at this time. Denies any needs. Vitals stable with telemetry in place. Respirations easy and unlabored.

## 2025-06-28 NOTE — ED TRIAGE NOTES
Pt to the ED with c/o chest pain. Pt reports this pain has been intermittent for approximately 8 days. Pt has extensive cardiac history. Pt reports pain 4/10 at this time. Pt also reports some shortness of breath with exertion. Pt follows with Dr. Gil for cardiology. EKG obtained upon arrival to ED room. No medications en route. Pt does reports taking 81 mg ASA today.

## 2025-06-28 NOTE — ED PROVIDER NOTES
Attending Supervising Physician's Attestation Statement  I performed a history and physical examination on the patient and discussed the management with the resident physician at 2150. I reviewed and agree with the findings and plan as documented in the resident physician note. This includes all diagnostic interpretations and treatment plans as written. I was present for the key portion of any procedures performed and the inclusive time noted in any critical care statement. Except as noted below.     Brief H&P   This patient is a 54 y.o. Female with chest pain, shortness of breath, and dyspnea on exertion.  Patient states that shortness of breath has been present for approximately 8 days, she has also had chest pain for the last 8 days.  She denies any fevers or chills, no nausea or vomiting.  She has nitroglycerin at home, but she states it did not help her pain.    On my examination, nontoxic patient, no distress, she is pale    HEENT: Normocephalic, Atraumatic. Neck is supple without TTP.   Lungs: Clear and equal bilaterally. No increased work of breathing or respiratory distress.  Heart: Rate and rhythm regular, no murmurs clicks or gallops  Abdomen: Soft non-tender, and non-distended abdomen. No rigidity. No Guarding.   Lower extremities: No edema, no tenderness to palpation.  Distal right foot post amputation  Neuro: Awake and alert, no lateralizing deficits, cranial nerves II through XII grossly intact bilaterally    Diagnostics and Treatments      LABS / RADIOLOGY:     Labs Reviewed   CBC WITH AUTO DIFFERENTIAL - Abnormal; Notable for the following components:       Result Value    Hematocrit 36.6 (*)     All other components within normal limits   COMPREHENSIVE METABOLIC PANEL - Abnormal; Notable for the following components:    Glucose 238 (*)     Creatinine 1.3 (*)     CO2 20 (*)     All other components within normal limits   BRAIN NATRIURETIC PEPTIDE - Abnormal; Notable for the following components:

## 2025-06-28 NOTE — ED NOTES
ED to inpatient nurses report      Chief Complaint:  Chief Complaint   Patient presents with    Chest Pain     Present to ED from: HOME    MOA:     LOC: alert and orientated to name, place, date  Mobility: Requires assistance * 1  Oxygen Baseline: ROOM AIR    Current needs required: 2L NC    Code Status:   Prior    What abnormal results were found and what did you give/do to treat them? CHEST PAIN, CORONARY ARTERY DISEASE  Any procedures or intervention occur? LABS, CXR    Mental Status:  Level of Consciousness: Alert (0)    Psych Assessment:        Vitals:  Patient Vitals for the past 24 hrs:   BP Temp Temp src Pulse Resp SpO2 Height Weight   06/27/25 2309 117/76 -- -- 77 16 100 % -- --   06/27/25 2142 123/73 98.3 °F (36.8 °C) Oral 84 16 93 % 1.651 m (5' 5\") 117.9 kg (260 lb)        LDAs:   Peripheral IV 06/27/25 Left;Anterior Forearm (Active)   Site Assessment Clean, dry & intact 06/27/25 2309   Line Status Normal saline locked 06/27/25 2309   Phlebitis Assessment No symptoms 06/27/25 2309   Infiltration Assessment 0 06/27/25 2309   Alcohol Cap Used Yes 06/27/25 2309   Dressing Status Clean, dry & intact 06/27/25 2309   Dressing Type Transparent 06/27/25 2309       Ambulatory Status:  No data recorded    Diagnosis:  DISPOSITION Decision To Admit 06/27/2025 11:46:44 PM   Final diagnoses:   Chest pain, unspecified type   Coronary artery disease involving native coronary artery of native heart with unstable angina pectoris (HCC)        Consults:  None     Pain Score:  Pain Assessment  Pain Assessment: 0-10  Pain Level: 4  Pain Location: Chest  Pain Type: Acute pain    C-SSRS:   Risk of Suicide: No Risk    Sepsis Screening:       [place] Fall Risk:       Swallow Screening        Preferred Language:   English      ALLERGIES     Meperidine, Meperidine hcl, Nitroglycerin, Pregabalin, and Promethazine    SURGICAL HISTORY       Past Surgical History:   Procedure Laterality Date    CABG WITH AORTIC VALVE REPAIR      CARDIAC

## 2025-06-28 NOTE — PLAN OF CARE
Problem: Discharge Planning  Goal: Discharge to home or other facility with appropriate resources  Outcome: Progressing  Flowsheets (Taken 6/28/2025 0844)  Discharge to home or other facility with appropriate resources: Identify barriers to discharge with patient and caregiver     Problem: ABCDS Injury Assessment  Goal: Absence of physical injury  Outcome: Progressing  Flowsheets (Taken 6/28/2025 0844)  Absence of Physical Injury: Implement safety measures based on patient assessment     Problem: Safety - Adult  Goal: Free from fall injury  Outcome: Progressing  Flowsheets (Taken 6/28/2025 0844)  Free From Fall Injury: Instruct family/caregiver on patient safety     Problem: Cardiovascular - Adult  Goal: Maintains optimal cardiac output and hemodynamic stability  Outcome: Progressing  Flowsheets (Taken 6/28/2025 0844)  Maintains optimal cardiac output and hemodynamic stability:   Monitor blood pressure and heart rate   Assess for signs of decreased cardiac output   Administer fluid and/or volume expanders as ordered   Administer vasoactive medications as ordered     Problem: Cardiovascular - Adult  Goal: Absence of cardiac dysrhythmias or at baseline  Outcome: Progressing  Flowsheets (Taken 6/28/2025 0844)  Absence of cardiac dysrhythmias or at baseline:   Monitor cardiac rate and rhythm   Assess for signs of decreased cardiac output   Administer antiarrhythmia medication and electrolyte replacement as ordered     Problem: Skin/Tissue Integrity - Adult  Goal: Skin integrity remains intact  Outcome: Progressing  Flowsheets (Taken 6/28/2025 0844)  Skin Integrity Remains Intact:   Monitor for areas of redness and/or skin breakdown   Assess vascular access sites hourly   Turn and reposition as indicated   Check visual cues for pain   Positioning devices     Problem: Infection - Adult  Goal: Absence of infection at discharge  Outcome: Progressing  Flowsheets (Taken 6/28/2025 0844)  Absence of infection at discharge:

## 2025-06-28 NOTE — ED NOTES
Pt in bed resting with eyes closed. Warm blanket provided per request. Updated on plan of care. Voiced no needs. Call light in reach.

## 2025-06-28 NOTE — H&P
Facility-Administered Medications: None     Allergies:  Meperidine, Meperidine hcl, Nitroglycerin, Pregabalin, and Promethazine    Past Medical, Family, Social, Surgical Hx      Diagnosis Date    CAD S/P percutaneous coronary angioplasty, 3 stents     Chronic depression     Chronic diastolic CHF (congestive heart failure) ([facility])     Diabetes mellitus, insulin dependent (IDDM), uncontrolled     Last Hgb A1c 14.3 on 2020    Diabetic polyneuropathy (HCC)     Fibromyalgia     Gastroesophageal reflux disease     Hepatitis C 2019    Heroin abuse (HCC)     Herpes     Kidney stone     Methamphetamine abuse ([facility])     Moderate episode of recurrent major depressive disorder ([facility])     Morbid obesity with BMI of 40.0-44.9, adult ([facility])     Polysubstance abuse ([facility])     S/P CABG x 1     Syncope     Transaminitis     Vertigo          Procedure Laterality Date    CABG WITH AORTIC VALVE REPAIR      CARDIAC PROCEDURE N/A 2024    Left heart cath / coronary angiography performed by Primo Gil MD at [facility] CARDIAC CATH LAB    CARDIAC PROCEDURE N/A 2024    Percutaneous coronary intervention performed by Primo Gil MD at [facility] CARDIAC CATH LAB    CARDIAC SURGERY       SECTION      KIDNEY STONE SURGERY       History reviewed. No pertinent family history.  Social History     Socioeconomic History    Marital status:      Spouse name: None    Number of children: None    Years of education: None    Highest education level: None   Tobacco Use    Smoking status: Some Days    Smokeless tobacco: Never   Vaping Use    Vaping status: Every Day   Substance and Sexual Activity    Alcohol use: Not Currently    Drug use: Not Currently     Types: Methamphetamines (Crystal Meth)    Sexual activity: Not Currently     Social Drivers of Health     Food Insecurity: No Food Insecurity (2024)    Hunger Vital Sign     Worried About Running Out of Food in the Last Year: Never true     Ran Out of Food in the

## 2025-06-28 NOTE — ED NOTES
Report received from Cora MOLINA. Pt resting on cot with eyes closed. Resp even and non labored. Call light in reach. Lights dimmed for comfort.

## 2025-06-28 NOTE — ED PROVIDER NOTES
[facility] EMERGENCY DEPARTMENT  EMERGENCY DEPARTMENT ENCOUNTER          Pt Name: Ashlee Grijalva  MRN: [id number]  Birthdate 1970  Date of evaluation: 6/27/2025  Physician: Shashank Barnett MD  Supervising Attending Physician: Cindy Jesus MD       CHIEF COMPLAINT       Chief Complaint   Patient presents with    Chest Pain         HISTORY OF PRESENT ILLNESS    HPI  Ashlee Grijalva is a 54 y.o. female who presents to the emergency department from home, brought in by EMS for evaluation of chest pain.  Patient reports that she has had a pressure in the left side of her chest for the past 8 days.  She states that during this time she has also had some worsening shortness of breath with exertion.  States that it does get better with rest.  States that she did take a nitro the night due to chest pressure however she fell asleep.  Does not know if it helped.  Currently rates her pain at a 4/10.  Does report some intermittent lightheadedness over these past days as well.  Denies any abdominal pain.  Denies any pain or burning with urination.  The patient has no other acute complaints at this time.            PAST MEDICAL AND SURGICAL HISTORY     Past Medical History:   Diagnosis Date    CAD S/P percutaneous coronary angioplasty, 3 stents     Chronic depression     Chronic diastolic CHF (congestive heart failure) (HCC)     Diabetes mellitus, insulin dependent (IDDM), uncontrolled     Last Hgb A1c 14.3 on 7/27/2020    Diabetic polyneuropathy (HCC)     Fibromyalgia     Gastroesophageal reflux disease     Hepatitis C 2019    Heroin abuse (HCC)     Herpes     Kidney stone     Methamphetamine abuse (HCC)     Moderate episode of recurrent major depressive disorder (HCC)     Morbid obesity with BMI of 40.0-44.9, adult (HCC)     Polysubstance abuse (HCC)     S/P CABG x 1 2012    Syncope     Transaminitis     Vertigo      Past Surgical History:   Procedure Laterality Date    CABG WITH AORTIC VALVE

## 2025-06-28 NOTE — ED NOTES
Pt awake at this time and repositioned for comfort. Updated on plan of care. Voiced no needs. Call light in reach.

## 2025-06-28 NOTE — CONSULTS
[facility]  STRZ CCU 3A  [address] [address]  Dept: [phone]  Loc: [phone]     Name: Ashlee Grijalva  Account number: [id number]  Medical Record Unit Number: [id number]  : 1970  Admit Date 2025  Consult Note Date 2025  Reason for Consult:  Chest pain  Requesting Physician: Josafat Zayas MD        CHIEF COMPLAINT:  Chest pain   FULLER  CAD  CDHF  HTN   HLD    HISTORY OF PRESENT ILLNESS:      The patient is a 54 y.o. white female Who was admitted with chest pain and FULLER.  Patient has a significant history of CAD.  She apparently had 6 heart attacks overall.  She says she had 3 heart attacks and multiple procedures on her right coronary artery in .  She eventually ended up having CABG x 1 with saphenous vein graft RCA in .  She says her most recent heart attack was in 2024.  The patient ended up having cardiac cath on 2024.  She had JT of the mid LAD and JT of an ostial circumflex in-stent restenosis.  The patient also has a history of chronic diastolic heart failure.  HEART Score = 6.    The patient says that she was doing reasonably well until 7 or 8 days ago when she began having left-sided chest pressure.  It did not radiate anywhere.  It would last for several minutes up to an hour.  It would tend to become less as time went by but did not go all the way away for up to an hour.  She says she has had anywhere from 2-5 of these episodes per day over the past week.  They are not exertional.  The patient denies any shortness of breath at rest but does note increasing dyspnea on exertion over the past 2 weeks.  She also has had occasional palpitations and heart pounding.  She has occasional dizziness but states she has vertigo.  No syncope.    Patient has a history of hypertension, diabetes mellitus type 2 and hyperlipidemia.  She is a former smoker quit smoking in  but began vaping at that time.  She has been

## 2025-06-29 VITALS
RESPIRATION RATE: 22 BRPM | WEIGHT: 267.86 LBS | SYSTOLIC BLOOD PRESSURE: 113 MMHG | OXYGEN SATURATION: 100 % | TEMPERATURE: 98.3 F | HEIGHT: 65 IN | HEART RATE: 76 BPM | BODY MASS INDEX: 44.63 KG/M2 | DIASTOLIC BLOOD PRESSURE: 64 MMHG

## 2025-06-29 LAB
EKG ATRIAL RATE: 69 BPM
EKG ATRIAL RATE: 85 BPM
EKG P AXIS: 31 DEGREES
EKG P AXIS: 34 DEGREES
EKG P-R INTERVAL: 154 MS
EKG P-R INTERVAL: 166 MS
EKG Q-T INTERVAL: 414 MS
EKG Q-T INTERVAL: 432 MS
EKG QRS DURATION: 116 MS
EKG QRS DURATION: 122 MS
EKG QTC CALCULATION (BAZETT): 462 MS
EKG QTC CALCULATION (BAZETT): 492 MS
EKG R AXIS: -21 DEGREES
EKG R AXIS: -33 DEGREES
EKG T AXIS: 102 DEGREES
EKG T AXIS: 97 DEGREES
EKG VENTRICULAR RATE: 69 BPM
EKG VENTRICULAR RATE: 85 BPM
GLUCOSE BLD STRIP.AUTO-MCNC: 162 MG/DL (ref 70–108)

## 2025-06-29 PROCEDURE — 93010 ELECTROCARDIOGRAM REPORT: CPT | Performed by: INTERNAL MEDICINE

## 2025-06-29 PROCEDURE — 99232 SBSQ HOSP IP/OBS MODERATE 35: CPT | Performed by: PHYSICIAN ASSISTANT

## 2025-06-29 PROCEDURE — 82948 REAGENT STRIP/BLOOD GLUCOSE: CPT

## 2025-06-29 PROCEDURE — 6370000000 HC RX 637 (ALT 250 FOR IP): Performed by: PHYSICIAN ASSISTANT

## 2025-06-29 PROCEDURE — G0378 HOSPITAL OBSERVATION PER HR: HCPCS

## 2025-06-29 PROCEDURE — 2500000003 HC RX 250 WO HCPCS: Performed by: PHYSICIAN ASSISTANT

## 2025-06-29 PROCEDURE — 99239 HOSP IP/OBS DSCHRG MGMT >30: CPT | Performed by: INTERNAL MEDICINE

## 2025-06-29 PROCEDURE — 6360000002 HC RX W HCPCS: Performed by: PHYSICIAN ASSISTANT

## 2025-06-29 PROCEDURE — 96372 THER/PROPH/DIAG INJ SC/IM: CPT

## 2025-06-29 RX ORDER — ISOSORBIDE MONONITRATE 30 MG/1
30 TABLET, EXTENDED RELEASE ORAL DAILY
Qty: 30 TABLET | Refills: 0 | Status: SHIPPED | OUTPATIENT
Start: 2025-06-29

## 2025-06-29 RX ADMIN — SPIRONOLACTONE 25 MG: 25 TABLET ORAL at 08:30

## 2025-06-29 RX ADMIN — ENOXAPARIN SODIUM 30 MG: 100 INJECTION SUBCUTANEOUS at 08:30

## 2025-06-29 RX ADMIN — CARVEDILOL 3.12 MG: 3.12 TABLET, FILM COATED ORAL at 08:30

## 2025-06-29 RX ADMIN — SERTRALINE 100 MG: 100 TABLET, FILM COATED ORAL at 08:30

## 2025-06-29 RX ADMIN — TICAGRELOR 90 MG: 90 TABLET ORAL at 08:30

## 2025-06-29 RX ADMIN — LEVOTHYROXINE SODIUM 75 MCG: 0.07 TABLET ORAL at 07:26

## 2025-06-29 RX ADMIN — SODIUM CHLORIDE, PRESERVATIVE FREE 10 ML: 5 INJECTION INTRAVENOUS at 08:33

## 2025-06-29 RX ADMIN — FAMOTIDINE 20 MG: 20 TABLET, FILM COATED ORAL at 08:30

## 2025-06-29 RX ADMIN — GABAPENTIN 600 MG: 600 TABLET, FILM COATED ORAL at 08:30

## 2025-06-29 RX ADMIN — FENOFIBRATE 160 MG: 160 TABLET ORAL at 08:30

## 2025-06-29 RX ADMIN — ARIPIPRAZOLE 10 MG: 10 TABLET ORAL at 08:30

## 2025-06-29 RX ADMIN — ACETAMINOPHEN 650 MG: 325 TABLET ORAL at 02:59

## 2025-06-29 RX ADMIN — LOSARTAN POTASSIUM 25 MG: 25 TABLET, FILM COATED ORAL at 08:30

## 2025-06-29 RX ADMIN — ASPIRIN 81 MG: 81 TABLET, CHEWABLE ORAL at 08:31

## 2025-06-29 RX ADMIN — INSULIN GLARGINE 32 UNITS: 100 INJECTION, SOLUTION SUBCUTANEOUS at 08:30

## 2025-06-29 ASSESSMENT — PAIN DESCRIPTION - PAIN TYPE
TYPE: CHRONIC PAIN
TYPE: ACUTE PAIN

## 2025-06-29 ASSESSMENT — PAIN DESCRIPTION - FREQUENCY
FREQUENCY: INTERMITTENT
FREQUENCY: CONTINUOUS

## 2025-06-29 ASSESSMENT — PAIN DESCRIPTION - LOCATION
LOCATION: CHEST
LOCATION: ARM

## 2025-06-29 ASSESSMENT — PAIN DESCRIPTION - ORIENTATION
ORIENTATION: RIGHT
ORIENTATION: MID

## 2025-06-29 ASSESSMENT — PAIN DESCRIPTION - ONSET
ONSET: ON-GOING
ONSET: AWAKENED FROM SLEEP

## 2025-06-29 ASSESSMENT — PAIN SCALES - GENERAL
PAINLEVEL_OUTOF10: 2
PAINLEVEL_OUTOF10: 8
PAINLEVEL_OUTOF10: 3

## 2025-06-29 ASSESSMENT — PAIN DESCRIPTION - DESCRIPTORS
DESCRIPTORS: PRESSURE
DESCRIPTORS: ACHING

## 2025-06-29 NOTE — DISCHARGE SUMMARY
Hospital Medicine Discharge Summary      Patient Identification:   Ashlee Grijalva   : 1970  MRN: [id number]   Account: [id number]      Patient's PCP: Bhavya Ojeda APRN - CNP    Admit Date: 2025     Discharge Date:   25    Admitting Physician: Jose Torrez MD     Discharge Physician: Josafat Zayas MD     Discharge Diagnoses: Chest Pain, not likely ACS; Hx of CAD s/p CABG and PCI     Active Hospital Problems    Diagnosis Date Noted    Abnormal ECG [R94.31] 2025     Priority: High    Chest pain [R07.9] 2025    Primary hypertension [I10] 2025    Acute kidney injury superimposed on CKD [N17.9, N18.9] 2025    Chronic heart failure with preserved ejection fraction (HFpEF) (HCC) [I50.32] 2025    Elevated troponin [R79.89]     Chronic diastolic heart failure (HCC) [I50.32]        The patient was seen and examined on day of discharge and this discharge summary is in conjunction with any daily progress note from day of discharge.    Hospital Course:   Ashlee Grijalva is a 54 y.o. female admitted to [facility] on 2025 for chest pain.     Patient has a significant history of CAD. She says she had 3 heart attacks and multiple procedures on her right coronary artery in . She eventually ended up having CABG x 1 with saphenous vein graft RCA in . She says her most recent heart attack was in 2024. The patient ended up having cardiac cath on 2024. She had JT of the mid LAD and JT of an ostial circumflex in-stent restenosis. The patient says that she was doing reasonably well until 7 or 8 days ago when she began having left-sided chest pressure. It did not radiate anywhere. It would last for several minutes up to an hour.     Patient had normal 2D Echo and lexiscan stress test done 2025. Admitted for chest pain work-up. Troponin 45, 43 then 44. No ECG changes. Cardiology consulted, started on Imdur 30 mg daily. Not

## 2025-06-29 NOTE — PROGRESS NOTES
[employer] form faxed. Transport time around 1pm  
Patient discharged at this time. All IV's removed. Discharge instructions, medication changes and follow up appointments explained at this time. All questions answered. AVS given to patient and reviewed with this RN. All patient belongings returned. Patient transported to [facility] via wheelchair. [facility] cab to transport patient home. Chart broken down and placed in yellow bin.         
was none.      Imaging Protocols      Rest                                Stress      Isotope:Tc-99m Sestamibi            Isotope: Tc-99m Sestamibi   Isotope dose:10.8 mCi               Isotope dose:35 mCi   Date:07/28/2020 09:03               Date:07/28/2020 10:09      Technique:           SPECT          Technique:           Gated                                                            SPECT     Imaging Results:Calculated gated LVEF 60 %.  The T.I.D. ratio was 1.21 .  There was no evidence of definite reversible tracer uptake.  The nuclear images is not suggestive for myocardial ischemia.     Conclusions      Summary   Lexiscan EKG stress test is not suggestive for ischemia.   This Nuclear Medicine study was negative for ischemia .      Signatures      ----------------------------------------------------------------   Electronically signed by Krish Rosenberg MD (Interpreting   Cardiologist) on 07/28/2020 at 14:40   ----------------------------------------------------------------     Medical History      Accession#:                           [id number]     Admission Data  Admission date: 07/26/2020 Admission Time: 19:04    Hospital Status: Inpatient.    [id number]       Cardiac Monitor: No results found for this or any previous visit.    Bernardo Stress Test: No results found for this or any previous visit.        Cath: 12/16/24     CARDIAC PROCEDURE 12/18/2024  7:51 AM (Final)     Conclusion    Successful PCI of the mid LAD x 1 JT    Successful PCI of the ostial LCX ISR x 1 JT    Patent SVG to PDA     Signed by: Primo Gil MD on 12/18/2024  7:51 AM  Results for orders placed or performed during the hospital encounter of 12/12/19   Cardiac Catheterization    Transcription                           [place] [address]

## 2025-06-29 NOTE — PLAN OF CARE
Problem: ABCDS Injury Assessment  Goal: Absence of physical injury  Outcome: Progressing     Problem: Safety - Adult  Goal: Free from fall injury  Outcome: Progressing  Flowsheets (Taken 6/28/2025 2000)  Free From Fall Injury: Instruct family/caregiver on patient safety     Problem: Cardiovascular - Adult  Goal: Maintains optimal cardiac output and hemodynamic stability  Outcome: Progressing  Goal: Absence of cardiac dysrhythmias or at baseline  Outcome: Progressing     Problem: Skin/Tissue Integrity - Adult  Goal: Skin integrity remains intact  Outcome: Progressing     Problem: Pain  Goal: Verbalizes/displays adequate comfort level or baseline comfort level  Outcome: Progressing

## 2025-07-08 ENCOUNTER — TELEPHONE (OUTPATIENT)
Dept: CARDIOLOGY CLINIC | Age: 55
End: 2025-07-08

## 2025-07-08 NOTE — TELEPHONE ENCOUNTER
Pre op Risk Assessment    Procedure colonoscopy   Physician Dr Weinberg  Date of surgery/procedure 07/15/2025    Last OV 05/2025  Last Stress 05/2025  Last Echo 05/2025  Last Cath 12/2024  Last Stent 12/2024  Is patient on blood thinners Brilinta, ASA  Hold Meds/how many days ?    Fax to [phone]

## 2025-08-19 ENCOUNTER — HOSPITAL ENCOUNTER (EMERGENCY)
Age: 55
Discharge: HOME | End: 2025-08-19
Attending: STUDENT IN AN ORGANIZED HEALTH CARE EDUCATION/TRAINING PROGRAM
Payer: MEDICAID

## 2025-08-19 VITALS
SYSTOLIC BLOOD PRESSURE: 120 MMHG | TEMPERATURE: 97.2 F | HEART RATE: 68 BPM | HEIGHT: 65 IN | BODY MASS INDEX: 43.32 KG/M2 | DIASTOLIC BLOOD PRESSURE: 52 MMHG | OXYGEN SATURATION: 99 % | WEIGHT: 260 LBS | RESPIRATION RATE: 18 BRPM

## 2025-08-19 DIAGNOSIS — E16.2 HYPOGLYCEMIA: Primary | ICD-10-CM

## 2025-08-19 DIAGNOSIS — N30.00 ACUTE CYSTITIS WITHOUT HEMATURIA: ICD-10-CM

## 2025-08-19 LAB
ALBUMIN SERPL BCG-MCNC: 3.8 G/DL (ref 3.4–4.9)
ALP SERPL-CCNC: 58 U/L (ref 38–126)
ALT SERPL W/O P-5'-P-CCNC: 23 U/L (ref 10–35)
ANION GAP SERPL CALC-SCNC: 13 MEQ/L (ref 8–16)
AST SERPL-CCNC: 39 U/L (ref 10–35)
BACTERIA URNS QL MICRO: ABNORMAL /HPF
BASOPHILS ABSOLUTE: 0 THOU/MM3 (ref 0–0.1)
BASOPHILS NFR BLD AUTO: 0.3 %
BILIRUB CONJ SERPL-MCNC: 0.2 MG/DL (ref 0–0.2)
BILIRUB SERPL-MCNC: 0.5 MG/DL (ref 0.3–1.2)
BILIRUB UR QL STRIP.AUTO: NEGATIVE
BUN SERPL-MCNC: 17 MG/DL (ref 8–23)
CALCIUM SERPL-MCNC: 9.7 MG/DL (ref 8.5–10.5)
CASTS #/AREA URNS LPF: ABNORMAL /LPF
CASTS 2: ABNORMAL /LPF
CHARACTER UR: CLEAR
CHLORIDE SERPL-SCNC: 107 MEQ/L (ref 98–111)
CO2 SERPL-SCNC: 22 MEQ/L (ref 22–29)
COLOR, UA: YELLOW
CREAT SERPL-MCNC: 1.1 MG/DL (ref 0.5–0.9)
CRYSTALS URNS MICRO: ABNORMAL
DEPRECATED RDW RBC AUTO: 43.3 FL (ref 35–45)
EOSINOPHIL NFR BLD AUTO: 2.3 %
EOSINOPHILS ABSOLUTE: 0.3 THOU/MM3 (ref 0–0.4)
EPITHELIAL CELLS, UA: ABNORMAL /HPF
ERYTHROCYTE [DISTWIDTH] IN BLOOD BY AUTOMATED COUNT: 13.8 % (ref 11.5–14.5)
GFR SERPL CREATININE-BSD FRML MDRD: 59 ML/MIN/1.73M2
GLUCOSE BLD STRIP.AUTO-MCNC: 105 MG/DL (ref 70–108)
GLUCOSE BLD STRIP.AUTO-MCNC: 120 MG/DL (ref 70–108)
GLUCOSE BLD STRIP.AUTO-MCNC: 263 MG/DL (ref 70–108)
GLUCOSE BLD-MCNC: 263 MG/DL
GLUCOSE SERPL-MCNC: 65 MG/DL (ref 74–109)
GLUCOSE UR QL STRIP.AUTO: NEGATIVE MG/DL
HCT VFR BLD AUTO: 38.8 % (ref 37–47)
HGB BLD-MCNC: 12.8 GM/DL (ref 12–16)
HGB UR QL STRIP.AUTO: ABNORMAL
IMM GRANULOCYTES # BLD AUTO: 0.05 THOU/MM3 (ref 0–0.07)
IMM GRANULOCYTES NFR BLD AUTO: 0.4 %
KETONES UR QL STRIP.AUTO: NEGATIVE
LIPASE SERPL-CCNC: 35 U/L (ref 13–60)
LYMPHOCYTES ABSOLUTE: 1.1 THOU/MM3 (ref 1–4.8)
LYMPHOCYTES NFR BLD AUTO: 8.7 %
MAGNESIUM SERPL-MCNC: 1.7 MG/DL (ref 1.6–2.6)
MCH RBC QN AUTO: 28.6 PG (ref 26–33)
MCHC RBC AUTO-ENTMCNC: 33 GM/DL (ref 32.2–35.5)
MCV RBC AUTO: 86.6 FL (ref 81–99)
MISCELLANEOUS 2: ABNORMAL
MONOCYTES ABSOLUTE: 0.6 THOU/MM3 (ref 0.4–1.3)
MONOCYTES NFR BLD AUTO: 4.8 %
NEUTROPHILS ABSOLUTE: 10.7 THOU/MM3 (ref 1.8–7.7)
NEUTROPHILS NFR BLD AUTO: 83.5 %
NITRITE UR QL STRIP: NEGATIVE
NRBC BLD AUTO-RTO: 0 /100 WBC
NT-PROBNP SERPL IA-MCNC: 1607 PG/ML (ref 0–124)
OSMOLALITY SERPL CALC.SUM OF ELEC: 282.8 MOSMOL/KG (ref 275–300)
PH UR STRIP.AUTO: 8 [PH] (ref 5–9)
PLATELET # BLD AUTO: 262 THOU/MM3 (ref 130–400)
PMV BLD AUTO: 9.1 FL (ref 9.4–12.4)
POTASSIUM SERPL-SCNC: 3.7 MEQ/L (ref 3.5–5.2)
PROT SERPL-MCNC: 6.6 G/DL (ref 6.4–8.3)
PROT UR STRIP.AUTO-MCNC: 30 MG/DL
RBC # BLD AUTO: 4.48 MILL/MM3 (ref 4.2–5.4)
RBC URINE: ABNORMAL /HPF
RENAL EPI CELLS #/AREA URNS HPF: ABNORMAL /[HPF]
SODIUM SERPL-SCNC: 142 MEQ/L (ref 135–145)
SP GR UR REFRACT.AUTO: 1.01 (ref 1–1.03)
TROPONIN, HIGH SENSITIVITY: 48 NG/L (ref 0–12)
TROPONIN, HIGH SENSITIVITY: 51 NG/L (ref 0–12)
TSH SERPL DL<=0.05 MIU/L-ACNC: 4.63 UIU/ML (ref 0.27–4.2)
UROBILINOGEN, URINE: 0.2 EU/DL (ref 0–1)
WBC # BLD AUTO: 12.8 THOU/MM3 (ref 4.8–10.8)
WBC #/AREA URNS HPF: ABNORMAL /HPF
WBC #/AREA URNS HPF: ABNORMAL /[HPF]
YEAST LIKE FUNGI URNS QL MICRO: ABNORMAL

## 2025-08-19 PROCEDURE — 83880 ASSAY OF NATRIURETIC PEPTIDE: CPT

## 2025-08-19 PROCEDURE — 99283 EMERGENCY DEPT VISIT LOW MDM: CPT

## 2025-08-19 PROCEDURE — 80053 COMPREHEN METABOLIC PANEL: CPT

## 2025-08-19 PROCEDURE — 81001 URINALYSIS AUTO W/SCOPE: CPT

## 2025-08-19 PROCEDURE — 36415 COLL VENOUS BLD VENIPUNCTURE: CPT

## 2025-08-19 PROCEDURE — 82948 REAGENT STRIP/BLOOD GLUCOSE: CPT

## 2025-08-19 PROCEDURE — 82248 BILIRUBIN DIRECT: CPT

## 2025-08-19 PROCEDURE — 6370000000 HC RX 637 (ALT 250 FOR IP): Performed by: STUDENT IN AN ORGANIZED HEALTH CARE EDUCATION/TRAINING PROGRAM

## 2025-08-19 PROCEDURE — 84484 ASSAY OF TROPONIN QUANT: CPT

## 2025-08-19 PROCEDURE — 83690 ASSAY OF LIPASE: CPT

## 2025-08-19 PROCEDURE — 84443 ASSAY THYROID STIM HORMONE: CPT

## 2025-08-19 PROCEDURE — 83735 ASSAY OF MAGNESIUM: CPT

## 2025-08-19 PROCEDURE — 87086 URINE CULTURE/COLONY COUNT: CPT

## 2025-08-19 PROCEDURE — 85025 COMPLETE CBC W/AUTO DIFF WBC: CPT

## 2025-08-19 RX ORDER — FUROSEMIDE 40 MG/1
20 TABLET ORAL ONCE
Status: COMPLETED | OUTPATIENT
Start: 2025-08-19 | End: 2025-08-19

## 2025-08-19 RX ORDER — FUROSEMIDE 20 MG/1
20 TABLET ORAL DAILY
Qty: 60 TABLET | Refills: 3 | Status: SHIPPED | OUTPATIENT
Start: 2025-08-19

## 2025-08-19 RX ORDER — CEPHALEXIN 500 MG/1
500 CAPSULE ORAL 2 TIMES DAILY
Qty: 14 CAPSULE | Refills: 0 | Status: SHIPPED | OUTPATIENT
Start: 2025-08-19 | End: 2025-08-26

## 2025-08-19 RX ADMIN — FUROSEMIDE 20 MG: 40 TABLET ORAL at 14:46

## 2025-08-19 RX ADMIN — CEPHALEXIN 500 MG: 250 CAPSULE ORAL at 15:24

## 2025-08-19 ASSESSMENT — PAIN DESCRIPTION - PAIN TYPE: TYPE: CHRONIC PAIN

## 2025-08-19 ASSESSMENT — PAIN - FUNCTIONAL ASSESSMENT: PAIN_FUNCTIONAL_ASSESSMENT: 0-10

## 2025-08-20 LAB
BACTERIA UR CULT: ABNORMAL
ORGANISM: ABNORMAL

## (undated) DEVICE — KIT ANGIO W/ AT P65 PREM HND CTRL FOR CNTRST DEL ANGIOTOUCH

## (undated) DEVICE — CATH BLLN ANGIO 3X12MM NC EUPHORIA RX

## (undated) DEVICE — TIBURON NEONATAL DRAPE: Brand: CONVERTORS

## (undated) DEVICE — PINNACLE INTRODUCER SHEATH: Brand: PINNACLE

## (undated) DEVICE — Device

## (undated) DEVICE — DEVICE INFL 20ML 30ATM POLYCARB BRL ABS PLUNG DGT DISPLAY

## (undated) DEVICE — CATHETER DIAG 5FR L100CM LUMN ID0.047IN JL4 CRV 0 SIDE H

## (undated) DEVICE — CATHETER DIAG 5FR L100CM LUMN ID0.047IN JR4 CRV 0 SIDE H

## (undated) DEVICE — CATH BLLN ANGIO 2X12MM NC EUPHORIA RX

## (undated) DEVICE — GUIDE CATHETER: Brand: RUNWAY™

## (undated) DEVICE — ANGIO-SEAL VIP VASCULAR CLOSURE DEVICE: Brand: ANGIO-SEAL

## (undated) DEVICE — 4F (1.0MM ID) X 9CM STIFF4F (1.0 MICRO-STICK®INTRODUCER SE WITH NITINOL GUIDEWIREWITH NITIN WITH RADIOPAQUE TIPWITH RADIOPAQ: Brand: MICRO-STICK SETMICRO-STICK SET

## (undated) DEVICE — KIT MFLD ISOLATN NACL CNTRST PRT TBNG SPIK W/ PRSS TRNSDUC

## (undated) DEVICE — CATH BLLN ANGIO 2.50X12MM NC EUPHORIA RX

## (undated) DEVICE — PROVE COVER: Brand: UNBRANDED

## (undated) DEVICE — GUIDEWIRE VASC L150CM DIA0.035IN FLX END L7CM J 3MM PTFE

## (undated) DEVICE — RUNTHROUGH NS EXTRA FLOPPY PTCA GUIDEWIRE: Brand: RUNTHROUGH

## (undated) DEVICE — VALVE HEMSTAS W/ GWIRE INSRTN TOOL GRDIAN II NC